# Patient Record
Sex: FEMALE | Race: WHITE | NOT HISPANIC OR LATINO | Employment: FULL TIME | URBAN - METROPOLITAN AREA
[De-identification: names, ages, dates, MRNs, and addresses within clinical notes are randomized per-mention and may not be internally consistent; named-entity substitution may affect disease eponyms.]

---

## 2020-03-10 ENCOUNTER — OFFICE VISIT (OUTPATIENT)
Dept: FAMILY MEDICINE CLINIC | Facility: CLINIC | Age: 35
End: 2020-03-10
Payer: COMMERCIAL

## 2020-03-10 VITALS
RESPIRATION RATE: 18 BRPM | SYSTOLIC BLOOD PRESSURE: 120 MMHG | DIASTOLIC BLOOD PRESSURE: 70 MMHG | TEMPERATURE: 99.4 F | BODY MASS INDEX: 21.69 KG/M2 | HEART RATE: 63 BPM | WEIGHT: 135 LBS | OXYGEN SATURATION: 94 % | HEIGHT: 66 IN

## 2020-03-10 DIAGNOSIS — J20.9 ACUTE BRONCHITIS, UNSPECIFIED ORGANISM: ICD-10-CM

## 2020-03-10 DIAGNOSIS — Z00.01 ENCOUNTER FOR WELL ADULT EXAM WITH ABNORMAL FINDINGS: Primary | ICD-10-CM

## 2020-03-10 DIAGNOSIS — H66.90 ACUTE OTITIS MEDIA, UNSPECIFIED OTITIS MEDIA TYPE: ICD-10-CM

## 2020-03-10 PROCEDURE — 99385 PREV VISIT NEW AGE 18-39: CPT | Performed by: FAMILY MEDICINE

## 2020-03-10 RX ORDER — DEXTROMETHORPHAN HYDROBROMIDE AND PROMETHAZINE HYDROCHLORIDE 15; 6.25 MG/5ML; MG/5ML
5 SOLUTION ORAL 4 TIMES DAILY PRN
Qty: 240 ML | Refills: 0 | Status: ON HOLD | OUTPATIENT
Start: 2020-03-10 | End: 2021-09-21

## 2020-03-10 RX ORDER — AMOXICILLIN 500 MG/1
500 CAPSULE ORAL EVERY 12 HOURS SCHEDULED
COMMUNITY
End: 2020-04-22 | Stop reason: ALTCHOICE

## 2020-03-10 RX ORDER — METHYLPREDNISOLONE 4 MG/1
TABLET ORAL
Qty: 21 TABLET | Refills: 0 | Status: ON HOLD | OUTPATIENT
Start: 2020-03-10 | End: 2021-09-21

## 2020-03-10 NOTE — PROGRESS NOTES
FAMILY PRACTICE HEALTH MAINTENANCE OFFICE VISIT  [facility]    NAME: Erlinda Musa  AGE: 36 y o  SEX: female  : 1985     DATE: 3/10/2020    Assessment and Plan     1  Encounter for well adult exam with abnormal findings    2  Acute bronchitis, unspecified organism  Comments:  supportive care reviewed  Orders:  -     methylPREDNISolone 4 MG tablet therapy pack; Use as directed on package  -     Promethazine-DM (PHENERGAN-DM) 6 25-15 mg/5 mL oral syrup; Take 5 mL by mouth 4 (four) times a day as needed for cough    3  Acute otitis media, unspecified otitis media type  Comments:  Complete amoxicillin course which was prescribed by urgent care  · Patient Counseling:   · Nutrition: Stressed importance of a well balanced diet, moderation of sodium/saturated fat, caloric balance and sufficient intake of fiber  · Exercise: Stressed the importance of regular exercise with a goal of 150 minutes per week  · Dental Health: Discussed daily flossing and brushing and regular dental visits   · Immunizations reviewed: Up To Date  · Discussed benefits of:  Cervical cancer screening- goes to [facility], [address] [place] [address]   BMI Counseling: Body mass index is 21 79 kg/m²  Discussed with patient's BMI with her  The BMI is normal      Return in about 1 year (around 3/10/2021) for Annual physical     Chief Complaint     Chief Complaint   Patient presents with    Establish Care     Needs PC    Follow-up     Ear infection and URI  fevers treated last week but not better  rmklpn    Physical Exam     rmklpn       History of Present Illness     URI    This is a new problem  The current episode started 1 to 4 weeks ago  The problem has been gradually worsening  The maximum temperature recorded prior to her arrival was 101 - 101 9 F   Associated symptoms include chest pain, congestion, coughing (productive ), ear pain, headaches, joint pain, rhinorrhea and a sore throat  Pertinent negatives include no abdominal pain, diarrhea, dysuria, nausea, neck pain, vomiting or wheezing  Treatments tried: was seen at the urgent care, diagnosed with bilateral otitis media and started on amoxicillin  The treatment provided mild relief  Well Adult Physical   Patient here for a comprehensive physical exam       Diet and Physical Activity  Diet: well balanced diet  Exercise: frequently      Depression Screen  PHQ-9 Depression Screening    PHQ-9:    Frequency of the following problems over the past two weeks:       Little interest or pleasure in doing things:  0 - not at all  Feeling down, depressed, or hopeless:  0 - not at all  PHQ-2 Score:  0          General Health  Hearing: Normal:  bilateral  Vision: no vision problems  Dental: no dental visits for >1 year    Reproductive Health  Regular Periods and Irregular Periods      The following portions of the patient's history were reviewed and updated as appropriate: allergies, current medications, past family history, past medical history, past social history, past surgical history and problem list     Review of Systems     Review of Systems   Constitutional: Positive for activity change  Negative for appetite change, chills, diaphoresis, fatigue and fever  HENT: Positive for congestion, ear pain, rhinorrhea and sore throat  Respiratory: Positive for cough (productive )  Negative for choking, chest tightness, shortness of breath and wheezing  Cardiovascular: Positive for chest pain  Negative for palpitations and leg swelling  Gastrointestinal: Negative for abdominal distention, abdominal pain, constipation, diarrhea, nausea and vomiting  Genitourinary: Negative for difficulty urinating, dyspareunia, dysuria, enuresis, flank pain, frequency, menstrual problem, pelvic pain and urgency  Musculoskeletal: Positive for joint pain   Negative for arthralgias, back pain, gait problem, joint swelling, myalgias, neck pain and neck stiffness  Skin: Negative  Neurological: Positive for headaches  Negative for dizziness, tremors, syncope, facial asymmetry, weakness, light-headedness and numbness  Psychiatric/Behavioral: Negative  Past Medical History     History reviewed  No pertinent past medical history  Past Surgical History     History reviewed  No pertinent surgical history  Social History     Social History     Socioeconomic History    Marital status: /Civil Union     Spouse name: None    Number of children: None    Years of education: None    Highest education level: None   Occupational History    None   Social Needs    Financial resource strain: None    Food insecurity:     Worry: None     Inability: None    Transportation needs:     Medical: None     Non-medical: None   Tobacco Use    Smoking status: Never Smoker    Smokeless tobacco: Never Used   Substance and Sexual Activity    Alcohol use: Yes     Frequency: 2-3 times a week     Drinks per session: 3 or 4    Drug use: Never    Sexual activity: None   Lifestyle    Physical activity:     Days per week: None     Minutes per session: None    Stress: None   Relationships    Social connections:     Talks on phone: None     Gets together: None     Attends [employer] service: None     Active member of club or organization: None     Attends meetings of clubs or organizations: None     Relationship status: None    Intimate partner violence:     Fear of current or ex partner: None     Emotionally abused: None     Physically abused: None     Forced sexual activity: None   Other Topics Concern    None   Social History Narrative    None       Family History     History reviewed  No pertinent family history      Current Medications       Current Outpatient Medications:     amoxicillin (AMOXIL) 500 mg capsule, Take 500 mg by mouth every 12 (twelve) hours, Disp: , Rfl:     methylPREDNISolone 4 MG tablet therapy pack, Use as directed on package, Disp: 21 tablet, Rfl: 0    Promethazine-DM (PHENERGAN-DM) 6 25-15 mg/5 mL oral syrup, Take 5 mL by mouth 4 (four) times a day as needed for cough, Disp: 240 mL, Rfl: 0     Allergies     No Known Allergies    Objective     /70   Pulse 63   Temp 99 4 °F (37 4 °C)   Resp 18   Ht 5' 6" (1 676 m)   Wt 61 2 kg (135 lb)   LMP 03/08/2020 (Exact Date)   SpO2 94%   BMI 21 79 kg/m²      Physical Exam   Constitutional: She is oriented to person, place, and time  She appears well-developed and well-nourished  No distress  HENT:   Head: Normocephalic and atraumatic  Right Ear: Hearing, external ear and ear canal normal  Tympanic membrane is erythematous  A middle ear effusion is present  Left Ear: Hearing, external ear and ear canal normal  A middle ear effusion is present  Mouth/Throat: Oropharynx is clear and moist    Neck: Normal range of motion  Neck supple  Cardiovascular: Normal rate, regular rhythm, normal heart sounds and intact distal pulses  Exam reveals no gallop and no friction rub  No murmur heard  Pulmonary/Chest: Effort normal and breath sounds normal  No respiratory distress  She has no wheezes  She has no rales  She exhibits no tenderness  Mildly decreased breath sounds noted throughout   Abdominal: Soft  Bowel sounds are normal  She exhibits no distension and no mass  There is no tenderness  There is no rebound and no guarding  Musculoskeletal: Normal range of motion  She exhibits no edema or deformity  Lymphadenopathy:     She has cervical adenopathy  Neurological: She is alert and oriented to person, place, and time  Skin: Skin is warm and dry  She is not diaphoretic  Psychiatric: She has a normal mood and affect   Her behavior is normal  Judgment and thought content normal           Visual Acuity Screening    Right eye Left eye Both eyes   Without correction: 20/20 20/20 20/13   With correction:              Naima Dorsey MD  [facility]

## 2020-03-11 ENCOUNTER — TELEPHONE (OUTPATIENT)
Dept: ADMINISTRATIVE | Facility: OTHER | Age: 35
End: 2020-03-11

## 2020-03-11 NOTE — LETTER
Procedure Request Form: Cervical Cancer Screening      Date Requested: 20  Patient: Gray Cape  Patient : 1985   Referring Provider: Aubrey Goldman, MD        Date of Procedure ______________________________       The above patient has informed us that they have completed their   most recent Cervical Cancer Screening at your facility  Please complete   this form and attach all corresponding procedure reports/results  Comments __________________________________________________________  ____________________________________________________________________  ____________________________________________________________________  ____________________________________________________________________    Facility Completing Procedure _________________________________________    Form Completed By (print name) _______________________________________      Signature __________________________________________________________      These reports are needed for  compliance    Please fax this completed form and a copy of the procedure report to our office located at [place] [address] as soon as possible to [phone] attention Oma: Phone [phone]    We thank you for your assistance in treating our mutual patient     (sent to [address])

## 2020-03-11 NOTE — LETTER
Vaccination Request Form: Tdap      Date Requested: 20  Patient: Kannan Ron  Patient : 1985   Referring Provider: Miguel A Winchester, MD       The above patient has informed us that they have had their   most recent Tdap administered at your facility  Please   complete this form and attach all corresponding documentation  Date of Vaccine Given  ______________________________    Lot Number _______________________________________    Manufacture ______________________________________    Dose Amount _____________________________________    Expiration Date ____________________________________    Comments __________________________________________________________  ____________________________________________________________________  ____________________________________________________________________  ____________________________________________________________________    Administering Facility  ________________________________________________    Vaccine Administered By (print name) ___________________________________      Form Completed By (print name) _______________________________________      Signature ___________________________________________________________      These reports are needed for  compliance    Please fax this completed form and a copy of the Vaccine Documents to our office located at [place] [address] as soon as possible to [phone] attention Oma: Phone [phone]    We thank you for your assistance in treating our mutual patient     (sent to [address])

## 2020-03-11 NOTE — TELEPHONE ENCOUNTER
----- Message from Ida David MD sent at 3/10/2020  9:22 AM EDT -----  Hello,   This patient has had her pap smear, HIV testing and tdap vaccine given at [facility] in [place], [place] in 2017 while she was pregnant  Pt does not remember the exact date  Can we please obtain these records and update her chart     Thank you,   Dr Tiffany Saavedra

## 2020-03-11 NOTE — TELEPHONE ENCOUNTER
Upon review of your request/inquiry, we have noted this is an outreach HIV request  Policy around HIV results is due to the sensitivity of the results, the patient must request and provide the requested records  Once received, the results can be sent via RightFax for the Centralized Scanning Team to update and complete the request      Any additional questions or concerns should be emailed to the Practice Liaisons via [email or web address] email, please do not reply via In Basket       Thank you  Crystal Smith MA

## 2020-03-11 NOTE — LETTER
Vaccination Request Form: Tdap      Date Requested: 20  Patient: Willard Siddiqi  Patient : 1985   Referring Provider: Laruth Field, MD       The above patient has informed us that they have had their   most recent Tdap administered at your facility  Please   complete this form and attach all corresponding documentation  Date of Vaccine Given  ______________________________    Lot Number _______________________________________    Manufacture ______________________________________    Dose Amount _____________________________________    Expiration Date ____________________________________    Comments __________________________________________________________  ____________________________________________________________________  ____________________________________________________________________  ____________________________________________________________________    Administering Facility  ________________________________________________    Vaccine Administered By (print name) ___________________________________      Form Completed By (print name) _______________________________________      Signature ___________________________________________________________      These reports are needed for  compliance    Please fax this completed form and a copy of the Vaccine Documents to our office located at [place] [address] as soon as possible to [phone] attention Oma: Phone [phone]    We thank you for your assistance in treating our mutual patient       (sent to [address])

## 2020-03-11 NOTE — LETTER
Procedure Request Form: Cervical Cancer Screening      Date Requested: 20  Patient: Kannan Ron  Patient : 1985   Referring Provider: Miguel A Winchester, MD        Date of Procedure ______________________________       The above patient has informed us that they have completed their   most recent Cervical Cancer Screening at your facility  Please complete   this form and attach all corresponding procedure reports/results  Comments __________________________________________________________  ____________________________________________________________________  ____________________________________________________________________  ____________________________________________________________________    Facility Completing Procedure _________________________________________    Form Completed By (print name) _______________________________________      Signature __________________________________________________________      These reports are needed for  compliance    Please fax this completed form and a copy of the procedure report to our office located at [place] [address] as soon as possible to [phone] attention Oma: Phone [phone]    We thank you for your assistance in treating our mutual patient     (sent to [address])

## 2020-03-11 NOTE — TELEPHONE ENCOUNTER
Upon review of the In Basket request and the patient's chart, initial outreach has been made via fax for Cervical Cancer Screening and TDAP immunization, please see Contacts section for details  A second outreach attempt will be made within 3 business days      Thank you  Cristina Linares MA

## 2020-03-19 NOTE — TELEPHONE ENCOUNTER
As a follow-up, a second attempt has been made for outreach via fax, please see Contacts section for details  A third and final attempt will be made within 3 business days      Thank you  Brooklynn Veloz MA

## 2020-03-25 NOTE — TELEPHONE ENCOUNTER
As a final attempt, a third outreach has been made via telephone call  Please see Contacts section for details  This encounter will be closed and completed by end of day  Should we receive the requested information because of previous outreach attempts, the requested patient's chart will be updated appropriately       Thank you  [place] MA

## 2020-04-22 ENCOUNTER — TELEMEDICINE (OUTPATIENT)
Dept: FAMILY MEDICINE CLINIC | Facility: CLINIC | Age: 35
End: 2020-04-22
Payer: COMMERCIAL

## 2020-04-22 DIAGNOSIS — L03.011 CELLULITIS OF RIGHT FINGER: Primary | ICD-10-CM

## 2020-04-22 PROCEDURE — 99213 OFFICE O/P EST LOW 20 MIN: CPT | Performed by: FAMILY MEDICINE

## 2020-04-22 RX ORDER — CEPHALEXIN 500 MG/1
500 CAPSULE ORAL EVERY 8 HOURS SCHEDULED
Qty: 15 CAPSULE | Refills: 0 | Status: SHIPPED | OUTPATIENT
Start: 2020-04-22 | End: 2020-04-27

## 2020-05-01 ENCOUNTER — TELEMEDICINE (OUTPATIENT)
Dept: FAMILY MEDICINE CLINIC | Facility: CLINIC | Age: 35
End: 2020-05-01
Payer: COMMERCIAL

## 2020-05-01 DIAGNOSIS — L03.011 CELLULITIS OF RIGHT FINGER: Primary | ICD-10-CM

## 2020-05-01 PROCEDURE — 99213 OFFICE O/P EST LOW 20 MIN: CPT | Performed by: FAMILY MEDICINE

## 2020-05-01 RX ORDER — SULFAMETHOXAZOLE AND TRIMETHOPRIM 800; 160 MG/1; MG/1
1 TABLET ORAL EVERY 12 HOURS SCHEDULED
Qty: 14 TABLET | Refills: 0 | Status: SHIPPED | OUTPATIENT
Start: 2020-05-01 | End: 2020-05-08

## 2020-05-15 ENCOUNTER — TELEMEDICINE (OUTPATIENT)
Dept: FAMILY MEDICINE CLINIC | Facility: CLINIC | Age: 35
End: 2020-05-15
Payer: COMMERCIAL

## 2020-05-15 DIAGNOSIS — L03.011 CELLULITIS OF RIGHT FINGER: Primary | ICD-10-CM

## 2020-05-15 PROCEDURE — 99442 PR PHYS/QHP TELEPHONE EVALUATION 11-20 MIN: CPT | Performed by: FAMILY MEDICINE

## 2020-05-21 ENCOUNTER — OFFICE VISIT (OUTPATIENT)
Dept: OBGYN CLINIC | Facility: CLINIC | Age: 35
End: 2020-05-21
Payer: COMMERCIAL

## 2020-05-21 ENCOUNTER — APPOINTMENT (OUTPATIENT)
Dept: RADIOLOGY | Facility: CLINIC | Age: 35
End: 2020-05-21
Payer: COMMERCIAL

## 2020-05-21 VITALS
HEART RATE: 63 BPM | WEIGHT: 125 LBS | DIASTOLIC BLOOD PRESSURE: 61 MMHG | SYSTOLIC BLOOD PRESSURE: 104 MMHG | TEMPERATURE: 98.9 F | BODY MASS INDEX: 19.62 KG/M2 | HEIGHT: 67 IN

## 2020-05-21 DIAGNOSIS — M79.644 PAIN OF FINGER OF RIGHT HAND: Primary | ICD-10-CM

## 2020-05-21 DIAGNOSIS — M79.644 PAIN OF FINGER OF RIGHT HAND: ICD-10-CM

## 2020-05-21 PROCEDURE — 99213 OFFICE O/P EST LOW 20 MIN: CPT | Performed by: ORTHOPAEDIC SURGERY

## 2020-05-21 PROCEDURE — 73130 X-RAY EXAM OF HAND: CPT

## 2020-05-21 PROCEDURE — 3008F BODY MASS INDEX DOCD: CPT | Performed by: ORTHOPAEDIC SURGERY

## 2020-06-03 ENCOUNTER — TELEPHONE (OUTPATIENT)
Dept: DERMATOLOGY | Facility: CLINIC | Age: 35
End: 2020-06-03

## 2020-06-03 NOTE — TELEPHONE ENCOUNTER
CALLED PT THIS AM AND SHE HAD REQUESTED A CALL BACK TO SCHEDULE AN APPT   CALLED PT THIS AFTERNOON AND LEFT A VOICEMAIL TO CALL BACK TO SCHEDULE VIA REFERRAL

## 2021-02-19 ENCOUNTER — TELEPHONE (OUTPATIENT)
Dept: DERMATOLOGY | Facility: CLINIC | Age: 36
End: 2021-02-19

## 2021-04-12 ENCOUNTER — TELEPHONE (OUTPATIENT)
Dept: OBGYN CLINIC | Facility: CLINIC | Age: 36
End: 2021-04-12

## 2021-04-12 NOTE — TELEPHONE ENCOUNTER
Pt sees Dr Anjana Mark   She stated that she saw him back in 5/2020 for an infected finger and was told that she would need to get her nail removed  Pt did not want to do that at that time but now it is still infected and has not gotten better       She would like to get it removed now  Please advise    Pt can be reached at [phone]

## 2021-04-12 NOTE — TELEPHONE ENCOUNTER
Patient stated she cannot come in today  Stated if she needs to be seen tomorrow it would have to be in the morning because she has her son  Scheduled for Thursday in [facility]  Please advise if you want her to come in tomorrow instead   Thanks

## 2021-05-05 ENCOUNTER — TELEPHONE (OUTPATIENT)
Dept: ENDOCRINOLOGY | Facility: CLINIC | Age: 36
End: 2021-05-05

## 2021-05-05 NOTE — TELEPHONE ENCOUNTER
Patient called our office in [facility] and wants to schedule a NPT appointment in [place]  Can you please call her at [phone]? She said she see's a [facility] doctor and wants to come in for hormone issues  Thank you

## 2021-09-18 ENCOUNTER — OFFICE VISIT (OUTPATIENT)
Dept: URGENT CARE | Facility: CLINIC | Age: 36
End: 2021-09-18
Payer: COMMERCIAL

## 2021-09-18 VITALS
WEIGHT: 137.6 LBS | TEMPERATURE: 98.4 F | HEIGHT: 67 IN | BODY MASS INDEX: 21.6 KG/M2 | OXYGEN SATURATION: 100 % | HEART RATE: 62 BPM | RESPIRATION RATE: 18 BRPM | SYSTOLIC BLOOD PRESSURE: 92 MMHG | DIASTOLIC BLOOD PRESSURE: 51 MMHG

## 2021-09-18 DIAGNOSIS — K08.89 PAIN, DENTAL: Primary | ICD-10-CM

## 2021-09-18 DIAGNOSIS — R22.0 JAW SWELLING: ICD-10-CM

## 2021-09-18 PROCEDURE — 99213 OFFICE O/P EST LOW 20 MIN: CPT | Performed by: PHYSICIAN ASSISTANT

## 2021-09-18 NOTE — PROGRESS NOTES
[facility]        NAME: Ronald Ha is a 30 y o  female  : 1985    MRN: [id number]  DATE: 2021  TIME: 8:55 AM    Assessment and Plan   Pain, dental [K08 89]  1  Pain, dental     2  Jaw swelling           Patient Instructions     Patient Instructions   Area shows severe swelling on the left lower jaw  Continue taking antibiotics as prescribed by the dentist   Be sure to finish the entire dose  Can apply ice to the area to help with the swelling   take 800 to a 1000 milligrams of ibuprofen every 6 hours and take the Tylenol 3 as prescribed   follow-up with dentist for root canal   if symptoms worsen proceed to the ER or call your dentist         Follow up with PCP in 3-5 days  Proceed to  ER if symptoms worsen  Chief Complaint     Chief Complaint   Patient presents with    Oral Swelling     has infected tooth dentist prescribed Clyndamycin and Tylenol w/Codeine has swelling in jaw of left side  face  Taking Ibuprophen q 4 hrs  for pain          History of Present Illness       71-year-old female with no chronic medical conditions presents for a left-sided swollen face and lymph node x4 days  Patient went to the dentist yesterday for the pain and was prescribed clindamycin and Tylenol with codeine  She now requires a root canal of the tooth in question  Patient started the clindamycin yesterday  She notes the pain is so severe and her throat is not sore because of the swelling  She has been taking the Tylenol with codeine as prescribed and taking 400 milligrams of ibuprofen every 3 hours with minimal pain relief  Patient denies any fever, chills, or headache  Review of Systems   Review of Systems   Constitutional: Negative for chills, fatigue and fever  HENT: Positive for dental problem and facial swelling  Negative for congestion, ear pain and sore throat  Respiratory: Negative for cough and shortness of breath      Cardiovascular: Negative for chest pain and palpitations  Gastrointestinal: Negative for abdominal pain, diarrhea, nausea and vomiting  Neurological: Negative for headaches  All other systems reviewed and are negative  Current Medications       Current Outpatient Medications:     Probiotic Product (PROBIOTIC PO), Take by mouth daily, Disp: , Rfl:     Calcium Carb-Cholecalciferol (CALCIUM 600+D3 PO), Take by mouth 2 (two) times a day (Patient not taking: Reported on 9/18/2021), Disp: , Rfl:     Cholecalciferol (VITAMIN D-3 PO), Take 2,000 mg by mouth daily (Patient not taking: Reported on 9/18/2021), Disp: , Rfl:     levothyroxine 150 mcg tablet, Take 150 mcg by mouth daily (Patient not taking: Reported on 9/18/2021), Disp: , Rfl:     MELOXICAM PO, Take 15 mg by mouth daily (Patient not taking: Reported on 9/18/2021), Disp: , Rfl:     methylPREDNISolone 4 MG tablet therapy pack, Use as directed on package (Patient not taking: Reported on 5/21/2020), Disp: 21 tablet, Rfl: 0    Multiple Vitamins-Minerals (ONE-A-DAY WOMENS PO), Take by mouth 18 mg, 100 mcg, 25 mcg (Patient not taking: Reported on 9/18/2021), Disp: , Rfl:     OMEPRAZOLE PO, Take 20 mg by mouth daily (Patient not taking: Reported on 9/18/2021), Disp: , Rfl:     Promethazine-DM (PHENERGAN-DM) 6 25-15 mg/5 mL oral syrup, Take 5 mL by mouth 4 (four) times a day as needed for cough (Patient not taking: Reported on 5/21/2020), Disp: 240 mL, Rfl: 0    Current Allergies     Allergies as of 09/18/2021    (No Known Allergies)            The following portions of the patient's history were reviewed and updated as appropriate: allergies, current medications, past family history, past medical history, past social history, past surgical history and problem list      No past medical history on file  No past surgical history on file  No family history on file  Medications have been verified          Objective   BP 92/51   Pulse 62   Temp 98 4 °F (36 9 °C)   Resp 18   Ht 5' 7" (1 702 m)   Wt 62 4 kg (137 lb 9 6 oz)   LMP 09/01/2021   SpO2 100%   BMI 21 55 kg/m²        Physical Exam     Physical Exam  Vitals and nursing note reviewed  Constitutional:       Appearance: Normal appearance  HENT:      Head: Normocephalic and atraumatic  Jaw: Tenderness and swelling present  Right Ear: Tympanic membrane normal       Left Ear: Tympanic membrane normal       Nose: Nose normal       Mouth/Throat:      Mouth: Mucous membranes are moist       Pharynx: No posterior oropharyngeal erythema  Eyes:      Extraocular Movements: Extraocular movements intact  Pupils: Pupils are equal, round, and reactive to light  Cardiovascular:      Rate and Rhythm: Normal rate and regular rhythm  Pulses: Normal pulses  Heart sounds: Normal heart sounds  No murmur heard  Pulmonary:      Effort: Pulmonary effort is normal  No respiratory distress  Breath sounds: Normal breath sounds  Skin:     General: Skin is warm and dry  Neurological:      Mental Status: She is alert and oriented to person, place, and time     Psychiatric:         Behavior: Behavior normal

## 2021-09-18 NOTE — PATIENT INSTRUCTIONS
Area shows severe swelling on the left lower jaw  Continue taking antibiotics as prescribed by the dentist   Be sure to finish the entire dose      Can apply ice to the area to help with the swelling   take 800 to a 1000 milligrams of ibuprofen every 6 hours and take the Tylenol 3 as prescribed   follow-up with dentist for root canal   if symptoms worsen proceed to the ER or call your dentist

## 2021-09-21 ENCOUNTER — HOSPITAL ENCOUNTER (INPATIENT)
Facility: HOSPITAL | Age: 36
LOS: 2 days | Discharge: HOME | DRG: 158 | End: 2021-09-23
Attending: INTERNAL MEDICINE | Admitting: INTERNAL MEDICINE
Payer: COMMERCIAL

## 2021-09-21 ENCOUNTER — APPOINTMENT (EMERGENCY)
Dept: RADIOLOGY | Facility: HOSPITAL | Age: 36
End: 2021-09-21
Payer: COMMERCIAL

## 2021-09-21 ENCOUNTER — HOSPITAL ENCOUNTER (EMERGENCY)
Facility: HOSPITAL | Age: 36
End: 2021-09-21
Attending: EMERGENCY MEDICINE
Payer: COMMERCIAL

## 2021-09-21 VITALS
OXYGEN SATURATION: 98 % | HEART RATE: 70 BPM | TEMPERATURE: 98.4 F | DIASTOLIC BLOOD PRESSURE: 74 MMHG | BODY MASS INDEX: 22.4 KG/M2 | RESPIRATION RATE: 16 BRPM | WEIGHT: 143 LBS | SYSTOLIC BLOOD PRESSURE: 108 MMHG

## 2021-09-21 DIAGNOSIS — K04.7 DENTAL ABSCESS: Primary | ICD-10-CM

## 2021-09-21 PROBLEM — E87.1 HYPONATREMIA: Status: ACTIVE | Noted: 2021-09-21

## 2021-09-21 LAB
ALBUMIN SERPL BCP-MCNC: 3.7 G/DL (ref 3.5–5)
ALP SERPL-CCNC: 48 U/L (ref 46–116)
ALT SERPL W P-5'-P-CCNC: 67 U/L (ref 12–78)
ANION GAP SERPL CALCULATED.3IONS-SCNC: 8 MMOL/L (ref 4–13)
AST SERPL W P-5'-P-CCNC: 55 U/L (ref 5–45)
BASOPHILS # BLD AUTO: 0.02 THOUSANDS/ΜL (ref 0–0.1)
BASOPHILS NFR BLD AUTO: 0 % (ref 0–1)
BILIRUB SERPL-MCNC: 0.49 MG/DL (ref 0.2–1)
BUN SERPL-MCNC: 10 MG/DL (ref 5–25)
CALCIUM SERPL-MCNC: 8.5 MG/DL (ref 8.3–10.1)
CHLORIDE SERPL-SCNC: 97 MMOL/L (ref 100–108)
CO2 SERPL-SCNC: 29 MMOL/L (ref 21–32)
CREAT SERPL-MCNC: 0.83 MG/DL (ref 0.6–1.3)
EOSINOPHIL # BLD AUTO: 0.01 THOUSAND/ΜL (ref 0–0.61)
EOSINOPHIL NFR BLD AUTO: 0 % (ref 0–6)
ERYTHROCYTE [DISTWIDTH] IN BLOOD BY AUTOMATED COUNT: 13.3 % (ref 11.6–15.1)
EXT PREG TEST URINE: NEGATIVE
EXT. CONTROL ED NAV: NORMAL
GFR SERPL CREATININE-BSD FRML MDRD: 92 ML/MIN/1.73SQ M
GLUCOSE SERPL-MCNC: 104 MG/DL (ref 65–140)
HCT VFR BLD AUTO: 37.9 % (ref 34.8–46.1)
HGB BLD-MCNC: 12.8 G/DL (ref 11.5–15.4)
IMM GRANULOCYTES # BLD AUTO: 0.02 THOUSAND/UL (ref 0–0.2)
IMM GRANULOCYTES NFR BLD AUTO: 0 % (ref 0–2)
LYMPHOCYTES # BLD AUTO: 0.93 THOUSANDS/ΜL (ref 0.6–4.47)
LYMPHOCYTES NFR BLD AUTO: 15 % (ref 14–44)
MCH RBC QN AUTO: 33.9 PG (ref 26.8–34.3)
MCHC RBC AUTO-ENTMCNC: 33.8 G/DL (ref 31.4–37.4)
MCV RBC AUTO: 100 FL (ref 82–98)
MONOCYTES # BLD AUTO: 0.65 THOUSAND/ΜL (ref 0.17–1.22)
MONOCYTES NFR BLD AUTO: 10 % (ref 4–12)
NEUTROPHILS # BLD AUTO: 4.79 THOUSANDS/ΜL (ref 1.85–7.62)
NEUTS SEG NFR BLD AUTO: 75 % (ref 43–75)
NRBC BLD AUTO-RTO: 0 /100 WBCS
PLATELET # BLD AUTO: 189 THOUSANDS/UL (ref 149–390)
PMV BLD AUTO: 10.2 FL (ref 8.9–12.7)
POTASSIUM SERPL-SCNC: 4.1 MMOL/L (ref 3.5–5.3)
PROT SERPL-MCNC: 7.1 G/DL (ref 6.4–8.2)
RBC # BLD AUTO: 3.78 MILLION/UL (ref 3.81–5.12)
SARS-COV-2 RNA RESP QL NAA+PROBE: NEGATIVE
SODIUM SERPL-SCNC: 134 MMOL/L (ref 136–145)
WBC # BLD AUTO: 6.42 THOUSAND/UL (ref 4.31–10.16)

## 2021-09-21 PROCEDURE — 99284 EMERGENCY DEPT VISIT MOD MDM: CPT

## 2021-09-21 PROCEDURE — U0003 INFECTIOUS AGENT DETECTION BY NUCLEIC ACID (DNA OR RNA); SEVERE ACUTE RESPIRATORY SYNDROME CORONAVIRUS 2 (SARS-COV-2) (CORONAVIRUS DISEASE [COVID-19]), AMPLIFIED PROBE TECHNIQUE, MAKING USE OF HIGH THROUGHPUT TECHNOLOGIES AS DESCRIBED BY CMS-2020-01-R: HCPCS | Performed by: EMERGENCY MEDICINE

## 2021-09-21 PROCEDURE — 99285 EMERGENCY DEPT VISIT HI MDM: CPT | Performed by: EMERGENCY MEDICINE

## 2021-09-21 PROCEDURE — G1004 CDSM NDSC: HCPCS

## 2021-09-21 PROCEDURE — 85025 COMPLETE CBC W/AUTO DIFF WBC: CPT | Performed by: EMERGENCY MEDICINE

## 2021-09-21 PROCEDURE — 81025 URINE PREGNANCY TEST: CPT | Performed by: EMERGENCY MEDICINE

## 2021-09-21 PROCEDURE — 99222 1ST HOSP IP/OBS MODERATE 55: CPT | Performed by: INTERNAL MEDICINE

## 2021-09-21 PROCEDURE — 96375 TX/PRO/DX INJ NEW DRUG ADDON: CPT

## 2021-09-21 PROCEDURE — U0005 INFEC AGEN DETEC AMPLI PROBE: HCPCS | Performed by: EMERGENCY MEDICINE

## 2021-09-21 PROCEDURE — 70491 CT SOFT TISSUE NECK W/DYE: CPT

## 2021-09-21 PROCEDURE — 36415 COLL VENOUS BLD VENIPUNCTURE: CPT | Performed by: EMERGENCY MEDICINE

## 2021-09-21 PROCEDURE — 96365 THER/PROPH/DIAG IV INF INIT: CPT

## 2021-09-21 PROCEDURE — 80053 COMPREHEN METABOLIC PANEL: CPT | Performed by: EMERGENCY MEDICINE

## 2021-09-21 RX ORDER — LANOLIN ALCOHOL/MO/W.PET/CERES
3 CREAM (GRAM) TOPICAL
Status: DISCONTINUED | OUTPATIENT
Start: 2021-09-21 | End: 2021-09-22

## 2021-09-21 RX ORDER — ACETAMINOPHEN 325 MG/1
650 TABLET ORAL EVERY 6 HOURS PRN
Status: DISCONTINUED | OUTPATIENT
Start: 2021-09-21 | End: 2021-09-21

## 2021-09-21 RX ORDER — DEXAMETHASONE SODIUM PHOSPHATE 4 MG/ML
10 INJECTION, SOLUTION INTRA-ARTICULAR; INTRALESIONAL; INTRAMUSCULAR; INTRAVENOUS; SOFT TISSUE ONCE
Status: COMPLETED | OUTPATIENT
Start: 2021-09-21 | End: 2021-09-21

## 2021-09-21 RX ORDER — ACETAMINOPHEN AND CODEINE PHOSPHATE 300; 30 MG/1; MG/1
TABLET ORAL
Status: ON HOLD | COMMUNITY
Start: 2021-09-17 | End: 2021-09-21

## 2021-09-21 RX ORDER — OXYCODONE HYDROCHLORIDE AND ACETAMINOPHEN 5; 325 MG/1; MG/1
1 TABLET ORAL ONCE
Status: DISCONTINUED | OUTPATIENT
Start: 2021-09-21 | End: 2021-09-21

## 2021-09-21 RX ORDER — CLINDAMYCIN HYDROCHLORIDE 300 MG/1
300 CAPSULE ORAL 3 TIMES DAILY
Status: ON HOLD | COMMUNITY
Start: 2021-09-17 | End: 2021-09-21

## 2021-09-21 RX ORDER — ACETAMINOPHEN 160 MG/5ML
975 SUSPENSION, ORAL (FINAL DOSE FORM) ORAL EVERY 8 HOURS PRN
Status: DISCONTINUED | OUTPATIENT
Start: 2021-09-21 | End: 2021-09-23 | Stop reason: HOSPADM

## 2021-09-21 RX ORDER — OXYCODONE HCL 5 MG/5 ML
5 SOLUTION, ORAL ORAL EVERY 6 HOURS PRN
Status: DISCONTINUED | OUTPATIENT
Start: 2021-09-21 | End: 2021-09-23 | Stop reason: HOSPADM

## 2021-09-21 RX ORDER — OXYCODONE HCL 5 MG/5 ML
2.5 SOLUTION, ORAL ORAL EVERY 4 HOURS PRN
Status: DISCONTINUED | OUTPATIENT
Start: 2021-09-21 | End: 2021-09-23

## 2021-09-21 RX ORDER — KETOROLAC TROMETHAMINE 30 MG/ML
15 INJECTION, SOLUTION INTRAMUSCULAR; INTRAVENOUS ONCE
Status: DISCONTINUED | OUTPATIENT
Start: 2021-09-21 | End: 2021-09-21

## 2021-09-21 RX ORDER — MORPHINE SULFATE 4 MG/ML
4 INJECTION, SOLUTION INTRAMUSCULAR; INTRAVENOUS ONCE
Status: COMPLETED | OUTPATIENT
Start: 2021-09-21 | End: 2021-09-21

## 2021-09-21 RX ORDER — SENNOSIDES 8.6 MG
1 TABLET ORAL
Status: DISCONTINUED | OUTPATIENT
Start: 2021-09-21 | End: 2021-09-23 | Stop reason: HOSPADM

## 2021-09-21 RX ORDER — OXYCODONE HCL 5 MG/5 ML
10 SOLUTION, ORAL ORAL EVERY 6 HOURS PRN
Status: DISCONTINUED | OUTPATIENT
Start: 2021-09-21 | End: 2021-09-22

## 2021-09-21 RX ORDER — ONDANSETRON 2 MG/ML
4 INJECTION INTRAMUSCULAR; INTRAVENOUS EVERY 6 HOURS PRN
Status: DISCONTINUED | OUTPATIENT
Start: 2021-09-21 | End: 2021-09-23 | Stop reason: HOSPADM

## 2021-09-21 RX ORDER — IBUPROFEN 600 MG/1
1000 TABLET ORAL 4 TIMES DAILY
Status: ON HOLD | COMMUNITY
Start: 2021-09-18 | End: 2021-09-21

## 2021-09-21 RX ORDER — ONDANSETRON 2 MG/ML
4 INJECTION INTRAMUSCULAR; INTRAVENOUS ONCE
Status: COMPLETED | OUTPATIENT
Start: 2021-09-21 | End: 2021-09-21

## 2021-09-21 RX ADMIN — ONDANSETRON 4 MG: 2 INJECTION INTRAMUSCULAR; INTRAVENOUS at 10:44

## 2021-09-21 RX ADMIN — SODIUM CHLORIDE 3 G: 9 INJECTION, SOLUTION INTRAVENOUS at 11:13

## 2021-09-21 RX ADMIN — Medication 3 MG: at 21:14

## 2021-09-21 RX ADMIN — IOHEXOL 85 ML: 350 INJECTION, SOLUTION INTRAVENOUS at 12:02

## 2021-09-21 RX ADMIN — MORPHINE SULFATE 4 MG: 4 INJECTION INTRAVENOUS at 10:46

## 2021-09-21 RX ADMIN — AMPICILLIN SODIUM AND SULBACTAM SODIUM 3 G: 10; 5 INJECTION, POWDER, FOR SOLUTION INTRAVENOUS at 19:55

## 2021-09-21 RX ADMIN — OXYCODONE HYDROCHLORIDE 10 MG: 5 SOLUTION ORAL at 21:25

## 2021-09-21 RX ADMIN — DEXAMETHASONE SODIUM PHOSPHATE 10 MG: 4 INJECTION INTRA-ARTICULAR; INTRALESIONAL; INTRAMUSCULAR; INTRAVENOUS; SOFT TISSUE at 10:49

## 2021-09-21 RX ADMIN — OXYCODONE HYDROCHLORIDE 5 MG: 5 SOLUTION ORAL at 18:34

## 2021-09-21 RX ADMIN — AMPICILLIN SODIUM AND SULBACTAM SODIUM 3 G: 10; 5 INJECTION, POWDER, FOR SOLUTION INTRAVENOUS at 23:21

## 2021-09-21 NOTE — PLAN OF CARE
Problem: PAIN - ADULT  Goal: Verbalizes/displays adequate comfort level or baseline comfort level  Description: Interventions:  - Encourage patient to monitor pain and request assistance  - Assess pain using appropriate pain scale  - Administer analgesics based on type and severity of pain and evaluate response  - Implement non-pharmacological measures as appropriate and evaluate response  - Consider cultural and social influences on pain and pain management  - Notify physician/advanced practitioner if interventions unsuccessful or patient reports new pain  Outcome: Progressing     Problem: INFECTION - ADULT  Goal: Absence or prevention of progression during hospitalization  Description: INTERVENTIONS:  - Assess and monitor for signs and symptoms of infection  - Monitor lab/diagnostic results  - Monitor all insertion sites, i e  indwelling lines, tubes, and drains  - Monitor endotracheal if appropriate and nasal secretions for changes in amount and color  - [place] appropriate cooling/warming therapies per order  - Administer medications as ordered  - Instruct and encourage patient and family to use good hand hygiene technique  - Identify and instruct in appropriate isolation precautions for identified infection/condition  Outcome: Progressing     Problem: DISCHARGE PLANNING  Goal: Discharge to home or other facility with appropriate resources  Description: INTERVENTIONS:  - Identify barriers to discharge w/patient and caregiver  - Arrange for needed discharge resources and transportation as appropriate  - Identify discharge learning needs (meds, wound care, etc )  - Arrange for interpretive services to assist at discharge as needed  - Refer to Case Management Department for coordinating discharge planning if the patient needs post-hospital services based on physician/advanced practitioner order or complex needs related to functional status, cognitive ability, or social support system  Outcome: Progressing Problem: Knowledge Deficit  Goal: Patient/family/caregiver demonstrates understanding of disease process, treatment plan, medications, and discharge instructions  Description: Complete learning assessment and assess knowledge base  Interventions:  - Provide teaching at level of understanding  - Provide teaching via preferred learning methods  Outcome: Progressing     Problem: Nutrition/Hydration-ADULT  Goal: Nutrient/Hydration intake appropriate for improving, restoring or maintaining nutritional needs  Description: Monitor and assess patient's nutrition/hydration status for malnutrition  Collaborate with interdisciplinary team and initiate plan and interventions as ordered  Monitor patient's weight and dietary intake as ordered or per policy  Utilize nutrition screening tool and intervene as necessary  Determine patient's food preferences and provide high-protein, high-caloric foods as appropriate       INTERVENTIONS:  - Monitor oral intake, urinary output, labs, and treatment plans  - Assess nutrition and hydration status and recommend course of action  - Evaluate amount of meals eaten  - Assist patient with eating if necessary   - Allow adequate time for meals  - Recommend/ encourage appropriate diets, oral nutritional supplements, and vitamin/mineral supplements  - Order, calculate, and assess calorie counts as needed  - Recommend, monitor, and adjust tube feedings and TPN/PPN based on assessed needs  - Assess need for intravenous fluids  - Provide specific nutrition/hydration education as appropriate  - Include patient/family/caregiver in decisions related to nutrition  Outcome: Progressing

## 2021-09-21 NOTE — H&P
[facility]  H&P- Samantha Palomino 1985, 30 y o  female MRN: [id number]  Unit/Bed#: W -07 Encounter: [id number]  Primary Care Provider: Nisha Hanlye MD   Date and time admitted to hospital: 9/21/2021  4:32 PM    Hyponatremia  Assessment & Plan  POA  Likely due to pain and poor intake    Plan:  · Pain control  · Monitor a m  Labs      * Dental abscess  Assessment & Plan  POA  dental pain infected tooth left lower jaw, molar since last Thursday  Went to dentist on Friday who started on clindamycin and pain meds  Went to urgent care on Saturday  On Monday she revisted dentist who doubled clinda and scheduled root canal for this Friday  Patient completed 5 days of clindamycin prior to arrival (9/17 to 9/21)    CT head: 3 6 cm abscess in left sublingual space  1 4 cm abscess in left medial pterygoid muscle  Diffuse inflammatory changes in left sublingual, left , and left submandibular spaces, likely related to infection  Plan:  · Continue Unasyn  · [facility] consulted  · Pain control  · Monitor for airway compromise        VTE Pharmacologic Prophylaxis: VTE Score: 0 Low Risk (Score 0-2) - Encourage Ambulation  Code Status: Level 1 - Full Code   Discussion with family: Attempted to update  () via phone  Unable to contact  Anticipated Length of Stay: Patient will be admitted on an inpatient basis with an anticipated length of stay of greater than 2 midnights secondary to Abscess drainage  Chief Complaint:  Left dental abscess pain    History of Present Illness:  Samantha Palomino is a 30 y o  female with a no pertinent PMH who presents with left jaw pain that radiates to her left ear since last Thursday 9/16  Denies any recent dental procedures  She states that she went to her dentist the following day 9/17, who prescribed her clindamycin and pain medications    She states that the pain has been getting progressively worse over the weekend so with associated fever/chills that started on Sunday 9/19  On Monday 9/20 patient revisited her dentist who increased her clindamycin dose and schedule her for a root canal this Friday 9/24  Today, patient presented to the ED [facility] with worsening pain  CT soft tissue neck with contrast showed 3 6 cm abscess in the left sublingual space and 1 4 cm abscess in the left medial pterygoid muscle  [facility] was consulted and ED provider spoke with Dr Betty Browne (oral surgeron) about this case  Patient was transferred to the [address] [place] [address] for admission and possible abscess drainage  Review of Systems:  Review of Systems   Constitutional: Positive for chills and fever  HENT: Positive for dental problem and ear pain (left ear)  Negative for hearing loss  Eyes: Negative for pain and visual disturbance  Respiratory: Negative for chest tightness and shortness of breath  Cardiovascular: Negative for chest pain and leg swelling  Gastrointestinal: Negative for abdominal pain and constipation  Genitourinary: Negative for dysuria and hematuria  Musculoskeletal: Negative for back pain and neck pain  Neurological: Negative for dizziness and light-headedness  Psychiatric/Behavioral: Negative for confusion and hallucinations  All other systems reviewed and are negative  Past Medical and Surgical History:   No past medical history on file  No past surgical history on file  Meds/Allergies:  Prior to Admission medications    Medication Sig Start Date End Date Taking?  Authorizing Provider   acetaminophen-codeine (TYLENOL #3) 300-30 mg per tablet  9/17/21  Yes Historical Provider, MD   clindamycin (CLEOCIN) 300 MG capsule Take 300 mg by mouth 3 (three) times a day  9/17/21  Yes Historical Provider, MD   ibuprofen (MOTRIN) 600 mg tablet Take 1,000 mg by mouth 4 (four) times a day 9/18/21 9/21/21 Yes Historical Provider, MD   Multiple Vitamins-Minerals (ONE-A-DAY WOMENS PO) Take by mouth 18 mg, 100 mcg, 25 mcg    Yes Historical Provider, MD   Probiotic Product (PROBIOTIC PO) Take by mouth daily   Yes Historical Provider, MD   Calcium Carb-Cholecalciferol (CALCIUM 600+D3 PO) Take by mouth 2 (two) times a day  Patient not taking: Reported on 9/18/2021    Historical Provider, MD   Cholecalciferol (VITAMIN D-3 PO) Take 2,000 mg by mouth daily  Patient not taking: Reported on 9/18/2021    Historical Provider, MD   levothyroxine 150 mcg tablet Take 150 mcg by mouth daily  Patient not taking: Reported on 9/18/2021    Historical Provider, MD   MELOXICAM PO Take 15 mg by mouth daily  Patient not taking: Reported on 9/18/2021    Historical Provider, MD   methylPREDNISolone 4 MG tablet therapy pack Use as directed on package  Patient not taking: Reported on 5/21/2020 3/10/20   Kemar Culver MD   OMEPRAZOLE PO Take 20 mg by mouth daily  Patient not taking: Reported on 9/18/2021    Historical Provider, MD   Promethazine-DM (PHENERGAN-DM) 6 25-15 mg/5 mL oral syrup Take 5 mL by mouth 4 (four) times a day as needed for cough  Patient not taking: Reported on 5/21/2020 3/10/20   Kemar Culver MD     I have reviewed home medications with patient personally  Allergies: No Known Allergies    Social History:  Marital Status: /Civil Union   Occupation:    Patient Pre-hospital Living Situation: Home  Patient Pre-hospital Level of Mobility: walks  Patient Pre-hospital Diet Restrictions: none  Substance Use History:   Social History     Substance and Sexual Activity   Alcohol Use Yes     Social History     Tobacco Use   Smoking Status Never Smoker   Smokeless Tobacco Never Used     Social History     Substance and Sexual Activity   Drug Use Never       Family History:  No family history on file      Physical Exam:       Vitals:   Pulse: 66 (09/21/21 1702)  Temperature: 97 8 °F (36 6 °C) (09/21/21 1702)  Height: 5' 7" (170 2 cm) (09/21/21 1641)  Weight - Scale: 64 9 kg (143 lb) (09/21/21 1641)  SpO2: 96 % (09/21/21 1702)    Physical Exam  Vitals and nursing note reviewed  Constitutional:       Appearance: Normal appearance  She is normal weight  She is not diaphoretic  Cardiovascular:      Rate and Rhythm: Normal rate and regular rhythm  Pulses: Normal pulses  Heart sounds: Normal heart sounds  No murmur heard  No friction rub  Pulmonary:      Effort: Pulmonary effort is normal  No respiratory distress  Breath sounds: Normal breath sounds  No wheezing or rales  Abdominal:      Palpations: Abdomen is soft  Tenderness: There is no abdominal tenderness  There is no guarding  Musculoskeletal:         General: Swelling and tenderness present  No deformity  Cervical back: Tenderness present  Right lower leg: No edema  Left lower leg: No edema  Comments: Left jaw swelling and tenderness with no signs of erythema  Airway intact   Skin:     General: Skin is warm  Coloration: Skin is not jaundiced or pale  Neurological:      Mental Status: She is alert  Psychiatric:         Mood and Affect: Mood normal          Behavior: Behavior normal           Additional Data:     Lab Results:  Results from last 7 days   Lab Units 09/21/21  1104   WBC Thousand/uL 6 42   HEMOGLOBIN g/dL 12 8   HEMATOCRIT % 37 9   PLATELETS Thousands/uL 189   NEUTROS PCT % 75   LYMPHS PCT % 15   MONOS PCT % 10   EOS PCT % 0     Results from last 7 days   Lab Units 09/21/21  1104   SODIUM mmol/L 134*   POTASSIUM mmol/L 4 1   CHLORIDE mmol/L 97*   CO2 mmol/L 29   BUN mg/dL 10   CREATININE mg/dL 0 83   ANION GAP mmol/L 8   CALCIUM mg/dL 8 5   ALBUMIN g/dL 3 7   TOTAL BILIRUBIN mg/dL 0 49   ALK PHOS U/L 48   ALT U/L 67   AST U/L 55*   GLUCOSE RANDOM mg/dL 104                       Imaging: Reviewed radiology reports from this admission including: CT head  No orders to display       EKG and Other Studies Reviewed on Admission:   · EKG: No EKG obtained      ** Please Note: This note has been constructed using a voice recognition system   **

## 2021-09-21 NOTE — ED PROVIDER NOTES
History  Chief Complaint   Patient presents with    Dental Pain     under the care of dentist for infected bottom left tooth  tx with clindamycin  facial pain and swelling  sent to ER for IV abx       31 y/o female presents with dental pain infected tooth left lower jaw, molar  Has been seeing dentist who wants to do a root canal  She is being treated with po clindamycin  No relief or improvement, getting worse  No breathing, or swallowing difficulty  No fevers chills nausea or vomiting  Has jaw and facial swelling  History provided by:  Patient   used: No        Prior to Admission Medications   Prescriptions Last Dose Informant Patient Reported? Taking? Calcium Carb-Cholecalciferol (CALCIUM 600+D3 PO)   Yes No   Sig: Take by mouth 2 (two) times a day   Patient not taking: Reported on 9/18/2021   Cholecalciferol (VITAMIN D-3 PO)   Yes No   Sig: Take 2,000 mg by mouth daily   Patient not taking: Reported on 9/18/2021   MELOXICAM PO   Yes No   Sig: Take 15 mg by mouth daily   Patient not taking: Reported on 9/18/2021   Multiple Vitamins-Minerals (ONE-A-DAY WOMENS PO)   Yes No   Sig: Take by mouth 18 mg, 100 mcg, 25 mcg   Patient not taking: Reported on 9/18/2021   OMEPRAZOLE PO   Yes No   Sig: Take 20 mg by mouth daily   Patient not taking: Reported on 9/18/2021   Probiotic Product (PROBIOTIC PO)   Yes No   Sig: Take by mouth daily   Promethazine-DM (PHENERGAN-DM) 6 25-15 mg/5 mL oral syrup   No No   Sig: Take 5 mL by mouth 4 (four) times a day as needed for cough   Patient not taking: Reported on 5/21/2020   levothyroxine 150 mcg tablet   Yes No   Sig: Take 150 mcg by mouth daily   Patient not taking: Reported on 9/18/2021   methylPREDNISolone 4 MG tablet therapy pack   No No   Sig: Use as directed on package   Patient not taking: Reported on 5/21/2020      Facility-Administered Medications: None       History reviewed  No pertinent past medical history  History reviewed   No pertinent surgical history  History reviewed  No pertinent family history  I have reviewed and agree with the history as documented  E-Cigarette/Vaping    E-Cigarette Use Never User      E-Cigarette/Vaping Substances    Nicotine No     THC No     CBD No     Flavoring No     Other No     Unknown No      Social History     Tobacco Use    Smoking status: Never Smoker    Smokeless tobacco: Never Used   Vaping Use    Vaping Use: Never used   Substance Use Topics    Alcohol use: Yes    Drug use: Never       Review of Systems   Constitutional: Negative  HENT: Positive for dental problem and facial swelling  Eyes: Negative  Respiratory: Negative  Cardiovascular: Negative  Gastrointestinal: Negative  Endocrine: Negative  Genitourinary: Negative  Musculoskeletal: Negative  Skin: Negative  Allergic/Immunologic: Negative  Neurological: Negative  Hematological: Negative  Psychiatric/Behavioral: Negative  All other systems reviewed and are negative  Physical Exam  Physical Exam  Constitutional:       Appearance: Normal appearance  HENT:      Head: Normocephalic and atraumatic  Comments: Left lower tooth, dental swelling edema tenderness  Cannot open her mouth completely, facial edema noted  No submandibular edema noted  Nose: Nose normal       Mouth/Throat:      Mouth: Mucous membranes are moist    Eyes:      Extraocular Movements: Extraocular movements intact  Pupils: Pupils are equal, round, and reactive to light  Cardiovascular:      Rate and Rhythm: Normal rate and regular rhythm  Pulmonary:      Effort: Pulmonary effort is normal       Breath sounds: Normal breath sounds  Abdominal:      General: Abdomen is flat  Bowel sounds are normal       Palpations: Abdomen is soft  Musculoskeletal:         General: Normal range of motion  Cervical back: Normal range of motion and neck supple  Skin:     General: Skin is warm        Capillary Refill: Capillary refill takes less than 2 seconds  Neurological:      General: No focal deficit present  Mental Status: She is alert and oriented to person, place, and time  Mental status is at baseline  Psychiatric:         Mood and Affect: Mood normal          Thought Content: Thought content normal          Vital Signs  ED Triage Vitals [09/21/21 0949]   Temperature Pulse Respirations Blood Pressure SpO2   98 4 °F (36 9 °C) 70 16 108/74 98 %      Temp src Heart Rate Source Patient Position - Orthostatic VS BP Location FiO2 (%)   -- -- -- -- --      Pain Score       Worst Possible Pain           Vitals:    09/21/21 0949   BP: 108/74   Pulse: 70         Visual Acuity      ED Medications  Medications   ampicillin-sulbactam (UNASYN) 3 g in sodium chloride 0 9 % 100 mL IVPB (0 g Intravenous Stopped 9/21/21 1143)   dexamethasone (DECADRON) injection 10 mg (10 mg Intravenous Given 9/21/21 1049)   morphine (PF) 4 mg/mL injection 4 mg (4 mg Intravenous Given 9/21/21 1046)   ondansetron (ZOFRAN) injection 4 mg (4 mg Intravenous Given 9/21/21 1044)   iohexol (OMNIPAQUE) 350 MG/ML injection (SINGLE-DOSE) 85 mL (85 mL Intravenous Given 9/21/21 1202)       Diagnostic Studies  Results Reviewed     Procedure Component Value Units Date/Time    Novel Coronavirus [facility] [[id number]]  (Normal) Collected: 09/21/21 1317    Lab Status: Final result Specimen: Nares from Nose Updated: 09/21/21 1420     SARS-CoV-2 Negative    Narrative: The specimen collection materials, transport medium, and/or testing methodology utilized in the production of these test results have been proven to be reliable in a limited validation with an abbreviated program under the Emergency Utilization Authorization provided by the FDA  Testing reported as "Presumptive positive" will be confirmed with secondary testing to ensure result accuracy    Clinical caution and judgement should be used with the interpretation of these results with consideration of the clinical impression and other laboratory testing  Testing reported as "Positive" or "Negative" has been proven to be accurate according to standard laboratory validation requirements  All testing is performed with control materials showing appropriate reactivity at standard intervals        POCT pregnancy, urine [[id number]]  (Normal) Resulted: 09/21/21 1220    Lab Status: Final result Updated: 09/21/21 1220     EXT PREG TEST UR (Ref: Negative) negative     Control valid    Comprehensive metabolic panel [[id number]]  (Abnormal) Collected: 09/21/21 1104    Lab Status: Final result Specimen: Blood from Arm, Left Updated: 09/21/21 1125     Sodium 134 mmol/L      Potassium 4 1 mmol/L      Chloride 97 mmol/L      CO2 29 mmol/L      ANION GAP 8 mmol/L      BUN 10 mg/dL      Creatinine 0 83 mg/dL      Glucose 104 mg/dL      Calcium 8 5 mg/dL      AST 55 U/L      ALT 67 U/L      Alkaline Phosphatase 48 U/L      Total Protein 7 1 g/dL      Albumin 3 7 g/dL      Total Bilirubin 0 49 mg/dL      eGFR 92 ml/min/1 73sq m     Narrative:      [place] guidelines for Chronic Kidney Disease (CKD):     Stage 1 with normal or high GFR (GFR > 90 mL/min/1 73 square meters)    Stage 2 Mild CKD (GFR = 60-89 mL/min/1 73 square meters)    Stage 3A Moderate CKD (GFR = 45-59 mL/min/1 73 square meters)    Stage 3B Moderate CKD (GFR = 30-44 mL/min/1 73 square meters)    Stage 4 Severe CKD (GFR = 15-29 mL/min/1 73 square meters)    Stage 5 End Stage CKD (GFR <15 mL/min/1 73 square meters)  Note: GFR calculation is accurate only with a steady state creatinine    CBC and differential [[id number]]  (Abnormal) Collected: 09/21/21 1104    Lab Status: Final result Specimen: Blood from Arm, Left Updated: 09/21/21 1109     WBC 6 42 Thousand/uL      RBC 3 78 Million/uL      Hemoglobin 12 8 g/dL      Hematocrit 37 9 %       fL      MCH 33 9 pg      MCHC 33 8 g/dL      RDW 13 3 %      MPV 10 2 fL Platelets 989 Thousands/uL      nRBC 0 /100 WBCs      Neutrophils Relative 75 %      Immat GRANS % 0 %      Lymphocytes Relative 15 %      Monocytes Relative 10 %      Eosinophils Relative 0 %      Basophils Relative 0 %      Neutrophils Absolute 4 79 Thousands/µL      Immature Grans Absolute 0 02 Thousand/uL      Lymphocytes Absolute 0 93 Thousands/µL      Monocytes Absolute 0 65 Thousand/µL      Eosinophils Absolute 0 01 Thousand/µL      Basophils Absolute 0 02 Thousands/µL                  CT soft tissue neck with contrast   Final Result by Mallissa Goodpasture, MD (09/21 1223)      3 6 cm abscess in left sublingual space  1 4 cm abscess in left medial pterygoid muscle  Diffuse inflammatory changes in left sublingual, left , and left submandibular spaces, likely related to infection  Workstation performed: [id number]                    Procedures  CriticalCare Time  Performed by: Lili Webb DO  Authorized by: Lili Webb DO     Critical care provider statement:     Critical care time (minutes):  45    Critical care was necessary to treat or prevent imminent or life-threatening deterioration of the following conditions: dental abscess      Critical care was time spent personally by me on the following activities:  Blood draw for specimens, obtaining history from patient or surrogate, development of treatment plan with patient or surrogate, discussions with consultants, evaluation of patient's response to treatment, examination of patient, interpretation of cardiac output measurements, ordering and performing treatments and interventions, ordering and review of laboratory studies, ordering and review of radiographic studies, re-evaluation of patient's condition and review of old charts    I assumed direction of critical care for this patient from another provider in my specialty: no               ED Course                             SBIRT 20yo+      Most Recent Value   SBIRT (22 yo +)   In order to provide better care to our patients, we are screening all of our patients for alcohol and drug use  Would it be okay to ask you these screening questions? Yes Filed at: 09/21/2021 1003   Initial Alcohol Screen: US AUDIT-C    1  How often do you have a drink containing alcohol?  0 Filed at: 09/21/2021 1003   2  How many drinks containing alcohol do you have on a typical day you are drinking? 0 Filed at: 09/21/2021 1003   3a  Male UNDER 65: How often do you have five or more drinks on one occasion? 0 Filed at: 09/21/2021 1003   3b  FEMALE Any Age, or MALE 65+: How often do you have 4 or more drinks on one occassion? 0 Filed at: 09/21/2021 1003   Audit-C Score  0 Filed at: 09/21/2021 1003   CONRADO: How many times in the past year have you    Used an illegal drug or used a prescription medication for non-medical reasons? Never Filed at: 09/21/2021 1003                    MDM  Number of Diagnoses or Management Options  Dental abscess  Diagnosis management comments: Spoke to dr Bessy Claire, oral surgeon          Amount and/or Complexity of Data Reviewed  Clinical lab tests: ordered and reviewed  Tests in the radiology section of CPT®: ordered and reviewed  Tests in the medicine section of CPT®: ordered and reviewed    Patient Progress  Patient progress: stable      Disposition  Final diagnoses:   Dental abscess     Time reflects when diagnosis was documented in both MDM as applicable and the Disposition within this note     Time User Action Codes Description Comment    9/21/2021  1:01 PM Abram Garcia Add [K04 7] Dental abscess       ED Disposition     ED Disposition Condition Date/Time Comment    Transfer to Another [address] Sep 21, 2021  1:55 PM Melinda Silva should be transferred out to [facility]      MD Documentation      Most Recent Value   Patient Condition  The patient has been stabilized such that within reasonable medical probability, no material deterioration of the patient condition or the condition of the unborn child(johanny) is likely to result from the transfer   Reason for Transfer  Level of Care needed not available at this facility   Benefits of Transfer  Specialized equipment and/or services available at the receiving facility (Include comment)________________________   Risks of Transfer  Potential for delay in receiving treatment   Accepting Physician  dr aSwyer Christy NameRenetta Both   Sending MD dr lezama   Provider Certification  General risk, such as traffic hazards, adverse weather conditions, rough terrain or turbulence, possible failure of equipment (including vehicle or aircraft), or consequences of actions of persons outside the control of the transport personnel      RN Documentation      [address] Name, Renetta Mcclelland   Level of Care  Advanced life support      Follow-up Information    None         Discharge Medication List as of 9/21/2021  4:04 PM      CONTINUE these medications which have NOT CHANGED    Details   Calcium Carb-Cholecalciferol (CALCIUM 600+D3 PO) Take by mouth 2 (two) times a day, Historical Med      Cholecalciferol (VITAMIN D-3 PO) Take 2,000 mg by mouth daily, Historical Med      levothyroxine 150 mcg tablet Take 150 mcg by mouth daily, Historical Med      MELOXICAM PO Take 15 mg by mouth daily, Historical Med      methylPREDNISolone 4 MG tablet therapy pack Use as directed on package, Normal      Multiple Vitamins-Minerals (ONE-A-DAY WOMENS PO) Take by mouth 18 mg, 100 mcg, 25 mcg, Historical Med      OMEPRAZOLE PO Take 20 mg by mouth daily, Historical Med      Probiotic Product (PROBIOTIC PO) Take by mouth daily, Historical Med      Promethazine-DM (PHENERGAN-DM) 6 25-15 mg/5 mL oral syrup Take 5 mL by mouth 4 (four) times a day as needed for cough, Starting Tue 3/10/2020, Normal           No discharge procedures on file      PDMP Review     None          ED Provider  Electronically Signed by           Nya Schmidt DO  09/21/21 3023

## 2021-09-21 NOTE — EMTALA/ACUTE CARE TRANSFER
[address] [address]  Dept: [phone]      EMTALA TRANSFER CONSENT    NAME Bruce Saeed                                         1985                              MRN [id number]    I have been informed of my rights regarding examination, treatment, and transfer   by Dr Lili Webb DO    Benefits: Specialized equipment and/or services available at the receiving facility (Include comment)________________________    Risks: Potential for delay in receiving treatment      Consent for Transfer:  I acknowledge that my medical condition has been evaluated and explained to me by the emergency department physician or other qualified medical person and/or my attending physician, who has recommended that I be transferred to the service of  Accepting Physician: dr Ran Tariq at [address] [place] [address] Name, [address] : [facility]  The above potential benefits of such transfer, the potential risks associated with such transfer, and the probable risks of not being transferred have been explained to me, and I fully understand them  The doctor has explained that, in my case, the benefits of transfer outweigh the risks  I agree to be transferred  I authorize the performance of emergency medical procedures and treatments upon me in both transit and upon arrival at the receiving facility  Additionally, I authorize the release of any and all medical records to the receiving facility and request they be transported with me, if possible  I understand that the safest mode of transportation during a medical emergency is an ambulance and that the Hospital advocates the use of this mode of transport  Risks of traveling to the receiving facility by car, including absence of medical control, life sustaining equipment, such as oxygen, and medical personnel has been explained to me and I fully understand them      (MAGED CORRECT BOX BELOW)  [  ]  I consent to the stated transfer and to be transported by ambulance/helicopter  [  ]  I consent to the stated transfer, but refuse transportation by ambulance and accept full responsibility for my transportation by car  I understand the risks of non-ambulance transfers and I exonerate the Hospital and its staff from any deterioration in my condition that results from this refusal     X___________________________________________    DATE  21  TIME________  Signature of patient or legally responsible individual signing on patient behalf           RELATIONSHIP TO PATIENT_________________________          Provider Certification    NAME Caitlin Barbsoa                                         1985                              MRN [id number]    A medical screening exam was performed on the above named patient  Based on the examination:    Condition Necessitating Transfer The encounter diagnosis was Dental abscess  Patient Condition: The patient has been stabilized such that within reasonable medical probability, no material deterioration of the patient condition or the condition of the unborn child(johanny) is likely to result from the transfer    Reason for Transfer: Level of Care needed not available at this facility    Transfer Requirements:  [address]   · Space available and qualified personnel available for treatment as acknowledged by    · Agreed to accept transfer and to provide appropriate medical treatment as acknowledged by       dr Deb Bledsoe  · Appropriate medical records of the examination and treatment of the patient are provided at the time of transfer   [address], [address] _______  · Transfer will be performed by qualified personnel from    and appropriate transfer equipment as required, including the use of necessary and appropriate life support measures      Provider Certification: I have examined the patient and explained the following risks and benefits of being transferred/refusing transfer to the patient/family:  General risk, such as traffic hazards, adverse weather conditions, rough terrain or turbulence, possible failure of equipment (including vehicle or aircraft), or consequences of actions of persons outside the control of the transport personnel      Based on these reasonable risks and benefits to the patient and/or the unborn child(johanny), and based upon the information available at the time of the patients examination, I certify that the medical benefits reasonably to be expected from the provision of appropriate medical treatments at another medical facility outweigh the increasing risks, if any, to the individuals medical condition, and in the case of labor to the unborn child, from effecting the transfer      X____________________________________________ DATE 09/21/21        TIME_______      ORIGINAL - SEND TO MEDICAL RECORDS   COPY - SEND WITH PATIENT DURING TRANSFER

## 2021-09-21 NOTE — ASSESSMENT & PLAN NOTE
POA  dental pain infected tooth left lower jaw, molar since last Thursday  Went to dentist on Friday who started on clindamycin and pain meds  Went to urgent care on Saturday  On Monday she revisted dentist who doubled clinda and scheduled root canal for this Friday  Patient completed 5 days of clindamycin prior to arrival (9/17 to 9/21)    CT head: 3 6 cm abscess in left sublingual space  1 4 cm abscess in left medial pterygoid muscle  Diffuse inflammatory changes in left sublingual, left , and left submandibular spaces, likely related to infection        Plan:  · Continue Unasyn  · [facility] consulted  · Pain control  · Monitor for airway compromise

## 2021-09-21 NOTE — EMTALA/ACUTE CARE TRANSFER
[address] [facility] [address]  [place] [address]  Dept: [phone]      EMTALA TRANSFER CONSENT    NAME Virginia Das                                         1985                              MRN [id number]    I have been informed of my rights regarding examination, treatment, and transfer   by Dr oDyle Hanley DO    Benefits: Specialized equipment and/or services available at the receiving facility (Include comment)________________________    Risks: Potential for delay in receiving treatment      Consent for Transfer:  I acknowledge that my medical condition has been evaluated and explained to me by the emergency department physician or other qualified medical person and/or my attending physician, who has recommended that I be transferred to the service of  Accepting Physician: Dr Sarai Kennedy at [address] [place] [address] Name, [address] : [facility]  The above potential benefits of such transfer, the potential risks associated with such transfer, and the probable risks of not being transferred have been explained to me, and I fully understand them  The doctor has explained that, in my case, the benefits of transfer outweigh the risks  I agree to be transferred  I authorize the performance of emergency medical procedures and treatments upon me in both transit and upon arrival at the receiving facility  Additionally, I authorize the release of any and all medical records to the receiving facility and request they be transported with me, if possible  I understand that the safest mode of transportation during a medical emergency is an ambulance and that the Hospital advocates the use of this mode of transport  Risks of traveling to the receiving facility by car, including absence of medical control, life sustaining equipment, such as oxygen, and medical personnel has been explained to me and I fully understand them      (MAGED CORRECT BOX BELOW)  [  ]  I consent to the stated transfer and to be transported by ambulance/helicopter  [  ]  I consent to the stated transfer, but refuse transportation by ambulance and accept full responsibility for my transportation by car  I understand the risks of non-ambulance transfers and I exonerate the Hospital and its staff from any deterioration in my condition that results from this refusal     X___________________________________________    DATE  21  TIME________  Signature of patient or legally responsible individual signing on patient behalf           RELATIONSHIP TO PATIENT_________________________          Provider Certification    NAME Felipe Santiago                                         1985                              MRN [id number]    A medical screening exam was performed on the above named patient  Based on the examination:    Condition Necessitating Transfer The encounter diagnosis was Dental abscess  Patient Condition: The patient has been stabilized such that within reasonable medical probability, no material deterioration of the patient condition or the condition of the unborn child(johanny) is likely to result from the transfer    Reason for Transfer: Level of Care needed not available at this facility    Transfer Requirements: [address]   · Space available and qualified personnel available for treatment as acknowledged by    · Agreed to accept transfer and to provide appropriate medical treatment as acknowledged by       Dr Wilbur Stovall  · Appropriate medical records of the examination and treatment of the patient are provided at the time of transfer   [address], [address] _______  · Transfer will be performed by qualified personnel from    and appropriate transfer equipment as required, including the use of necessary and appropriate life support measures      Provider Certification: I have examined the patient and explained the following risks and benefits of being transferred/refusing transfer to the patient/family:  General risk, such as traffic hazards, adverse weather conditions, rough terrain or turbulence, possible failure of equipment (including vehicle or aircraft), or consequences of actions of persons outside the control of the transport personnel      Based on these reasonable risks and benefits to the patient and/or the unborn child(johanny), and based upon the information available at the time of the patients examination, I certify that the medical benefits reasonably to be expected from the provision of appropriate medical treatments at another medical facility outweigh the increasing risks, if any, to the individuals medical condition, and in the case of labor to the unborn child, from effecting the transfer      X____________________________________________ DATE 09/21/21        TIME_______      ORIGINAL - SEND TO MEDICAL RECORDS   COPY - SEND WITH PATIENT DURING TRANSFER

## 2021-09-22 ENCOUNTER — ANESTHESIA (INPATIENT)
Dept: PERIOP | Facility: HOSPITAL | Age: 36
DRG: 158 | End: 2021-09-22
Payer: COMMERCIAL

## 2021-09-22 ENCOUNTER — ANESTHESIA EVENT (INPATIENT)
Dept: PERIOP | Facility: HOSPITAL | Age: 36
DRG: 158 | End: 2021-09-22
Payer: COMMERCIAL

## 2021-09-22 LAB
ALBUMIN SERPL BCP-MCNC: 3.3 G/DL (ref 3.5–5)
ALP SERPL-CCNC: 48 U/L (ref 46–116)
ALT SERPL W P-5'-P-CCNC: 64 U/L (ref 12–78)
ANION GAP SERPL CALCULATED.3IONS-SCNC: 4 MMOL/L (ref 4–13)
AST SERPL W P-5'-P-CCNC: 44 U/L (ref 5–45)
BILIRUB SERPL-MCNC: 0.41 MG/DL (ref 0.2–1)
BUN SERPL-MCNC: 8 MG/DL (ref 5–25)
CALCIUM ALBUM COR SERPL-MCNC: 8.9 MG/DL (ref 8.3–10.1)
CALCIUM SERPL-MCNC: 8.3 MG/DL (ref 8.3–10.1)
CHLORIDE SERPL-SCNC: 97 MMOL/L (ref 100–108)
CO2 SERPL-SCNC: 30 MMOL/L (ref 21–32)
CREAT SERPL-MCNC: 0.83 MG/DL (ref 0.6–1.3)
ERYTHROCYTE [DISTWIDTH] IN BLOOD BY AUTOMATED COUNT: 13.3 % (ref 11.6–15.1)
GFR SERPL CREATININE-BSD FRML MDRD: 92 ML/MIN/1.73SQ M
GLUCOSE SERPL-MCNC: 109 MG/DL (ref 65–140)
HCT VFR BLD AUTO: 37.6 % (ref 34.8–46.1)
HGB BLD-MCNC: 12.9 G/DL (ref 11.5–15.4)
MCH RBC QN AUTO: 33.9 PG (ref 26.8–34.3)
MCHC RBC AUTO-ENTMCNC: 34.3 G/DL (ref 31.4–37.4)
MCV RBC AUTO: 99 FL (ref 82–98)
PLATELET # BLD AUTO: 226 THOUSANDS/UL (ref 149–390)
PMV BLD AUTO: 9.9 FL (ref 8.9–12.7)
POTASSIUM SERPL-SCNC: 4 MMOL/L (ref 3.5–5.3)
PROT SERPL-MCNC: 6.8 G/DL (ref 6.4–8.2)
RBC # BLD AUTO: 3.8 MILLION/UL (ref 3.81–5.12)
SODIUM SERPL-SCNC: 131 MMOL/L (ref 136–145)
WBC # BLD AUTO: 8.28 THOUSAND/UL (ref 4.31–10.16)

## 2021-09-22 PROCEDURE — 85027 COMPLETE CBC AUTOMATED: CPT

## 2021-09-22 PROCEDURE — 87205 SMEAR GRAM STAIN: CPT | Performed by: DENTIST

## 2021-09-22 PROCEDURE — 0J9500Z DRAINAGE OF LEFT NECK SUBCUTANEOUS TISSUE AND FASCIA WITH DRAINAGE DEVICE, OPEN APPROACH: ICD-10-PCS | Performed by: DENTIST

## 2021-09-22 PROCEDURE — 87076 CULTURE ANAEROBE IDENT EACH: CPT | Performed by: DENTIST

## 2021-09-22 PROCEDURE — 87077 CULTURE AEROBIC IDENTIFY: CPT | Performed by: DENTIST

## 2021-09-22 PROCEDURE — 87181 SC STD AGAR DILUTION PER AGT: CPT | Performed by: DENTIST

## 2021-09-22 PROCEDURE — 87185 SC STD ENZYME DETCJ PER NZM: CPT | Performed by: DENTIST

## 2021-09-22 PROCEDURE — 0CDXXZ0 EXTRACTION OF LOWER TOOTH, SINGLE, EXTERNAL APPROACH: ICD-10-PCS | Performed by: DENTIST

## 2021-09-22 PROCEDURE — 87075 CULTR BACTERIA EXCEPT BLOOD: CPT | Performed by: DENTIST

## 2021-09-22 PROCEDURE — 99232 SBSQ HOSP IP/OBS MODERATE 35: CPT | Performed by: HOSPITALIST

## 2021-09-22 PROCEDURE — 80053 COMPREHEN METABOLIC PANEL: CPT

## 2021-09-22 PROCEDURE — 88300 SURGICAL PATH GROSS: CPT | Performed by: PATHOLOGY

## 2021-09-22 PROCEDURE — 87070 CULTURE OTHR SPECIMN AEROBIC: CPT | Performed by: DENTIST

## 2021-09-22 RX ORDER — BUPIVACAINE HYDROCHLORIDE 2.5 MG/ML
INJECTION, SOLUTION EPIDURAL; INFILTRATION; INTRACAUDAL AS NEEDED
Status: DISCONTINUED | OUTPATIENT
Start: 2021-09-22 | End: 2021-09-22 | Stop reason: HOSPADM

## 2021-09-22 RX ORDER — ALBUTEROL SULFATE 2.5 MG/3ML
2.5 SOLUTION RESPIRATORY (INHALATION) ONCE AS NEEDED
Status: DISCONTINUED | OUTPATIENT
Start: 2021-09-22 | End: 2021-09-22 | Stop reason: HOSPADM

## 2021-09-22 RX ORDER — PROPOFOL 10 MG/ML
INJECTION, EMULSION INTRAVENOUS AS NEEDED
Status: DISCONTINUED | OUTPATIENT
Start: 2021-09-22 | End: 2021-09-22

## 2021-09-22 RX ORDER — HYDROMORPHONE HCL/PF 1 MG/ML
0.5 SYRINGE (ML) INJECTION EVERY 4 HOURS PRN
Status: DISCONTINUED | OUTPATIENT
Start: 2021-09-22 | End: 2021-09-22

## 2021-09-22 RX ORDER — ONDANSETRON 2 MG/ML
4 INJECTION INTRAMUSCULAR; INTRAVENOUS ONCE AS NEEDED
Status: DISCONTINUED | OUTPATIENT
Start: 2021-09-22 | End: 2021-09-22 | Stop reason: HOSPADM

## 2021-09-22 RX ORDER — SUCCINYLCHOLINE/SOD CL,ISO/PF 100 MG/5ML
SYRINGE (ML) INTRAVENOUS AS NEEDED
Status: DISCONTINUED | OUTPATIENT
Start: 2021-09-22 | End: 2021-09-22

## 2021-09-22 RX ORDER — DIPHENHYDRAMINE HYDROCHLORIDE 50 MG/ML
25 INJECTION INTRAMUSCULAR; INTRAVENOUS ONCE AS NEEDED
Status: DISCONTINUED | OUTPATIENT
Start: 2021-09-22 | End: 2021-09-23 | Stop reason: HOSPADM

## 2021-09-22 RX ORDER — LANOLIN ALCOHOL/MO/W.PET/CERES
3 CREAM (GRAM) TOPICAL
Status: DISCONTINUED | OUTPATIENT
Start: 2021-09-22 | End: 2021-09-23 | Stop reason: HOSPADM

## 2021-09-22 RX ORDER — MAGNESIUM HYDROXIDE 1200 MG/15ML
LIQUID ORAL AS NEEDED
Status: DISCONTINUED | OUTPATIENT
Start: 2021-09-22 | End: 2021-09-22 | Stop reason: HOSPADM

## 2021-09-22 RX ORDER — METHYLPREDNISOLONE SODIUM SUCCINATE 125 MG/2ML
60 INJECTION, POWDER, LYOPHILIZED, FOR SOLUTION INTRAMUSCULAR; INTRAVENOUS ONCE
Status: COMPLETED | OUTPATIENT
Start: 2021-09-22 | End: 2021-09-22

## 2021-09-22 RX ORDER — HYDROMORPHONE HCL/PF 1 MG/ML
1 SYRINGE (ML) INJECTION EVERY 4 HOURS PRN
Status: DISCONTINUED | OUTPATIENT
Start: 2021-09-22 | End: 2021-09-23

## 2021-09-22 RX ORDER — LIDOCAINE HYDROCHLORIDE 10 MG/ML
INJECTION, SOLUTION EPIDURAL; INFILTRATION; INTRACAUDAL; PERINEURAL AS NEEDED
Status: DISCONTINUED | OUTPATIENT
Start: 2021-09-22 | End: 2021-09-22

## 2021-09-22 RX ORDER — HYDROMORPHONE HYDROCHLORIDE 2 MG/1
2 TABLET ORAL EVERY 4 HOURS PRN
Status: DISCONTINUED | OUTPATIENT
Start: 2021-09-22 | End: 2021-09-22

## 2021-09-22 RX ORDER — FENTANYL CITRATE/PF 50 MCG/ML
25 SYRINGE (ML) INJECTION
Status: DISCONTINUED | OUTPATIENT
Start: 2021-09-22 | End: 2021-09-22 | Stop reason: HOSPADM

## 2021-09-22 RX ORDER — SODIUM CHLORIDE, SODIUM LACTATE, POTASSIUM CHLORIDE, CALCIUM CHLORIDE 600; 310; 30; 20 MG/100ML; MG/100ML; MG/100ML; MG/100ML
INJECTION, SOLUTION INTRAVENOUS CONTINUOUS PRN
Status: DISCONTINUED | OUTPATIENT
Start: 2021-09-22 | End: 2021-09-22

## 2021-09-22 RX ORDER — NALOXONE HYDROCHLORIDE 1 MG/ML
2 INJECTION INTRAMUSCULAR; INTRAVENOUS; SUBCUTANEOUS ONCE AS NEEDED
Status: DISCONTINUED | OUTPATIENT
Start: 2021-09-22 | End: 2021-09-22

## 2021-09-22 RX ORDER — DIPHENHYDRAMINE HYDROCHLORIDE 50 MG/ML
12.5 INJECTION INTRAMUSCULAR; INTRAVENOUS ONCE AS NEEDED
Status: DISCONTINUED | OUTPATIENT
Start: 2021-09-22 | End: 2021-09-22 | Stop reason: HOSPADM

## 2021-09-22 RX ORDER — FENTANYL CITRATE 50 UG/ML
INJECTION, SOLUTION INTRAMUSCULAR; INTRAVENOUS AS NEEDED
Status: DISCONTINUED | OUTPATIENT
Start: 2021-09-22 | End: 2021-09-22

## 2021-09-22 RX ORDER — LIDOCAINE HYDROCHLORIDE AND EPINEPHRINE 10; 10 MG/ML; UG/ML
INJECTION, SOLUTION INFILTRATION; PERINEURAL AS NEEDED
Status: DISCONTINUED | OUTPATIENT
Start: 2021-09-22 | End: 2021-09-22 | Stop reason: HOSPADM

## 2021-09-22 RX ORDER — DEXAMETHASONE SODIUM PHOSPHATE 10 MG/ML
INJECTION, SOLUTION INTRAMUSCULAR; INTRAVENOUS AS NEEDED
Status: DISCONTINUED | OUTPATIENT
Start: 2021-09-22 | End: 2021-09-22

## 2021-09-22 RX ORDER — MIDAZOLAM HYDROCHLORIDE 2 MG/2ML
INJECTION, SOLUTION INTRAMUSCULAR; INTRAVENOUS AS NEEDED
Status: DISCONTINUED | OUTPATIENT
Start: 2021-09-22 | End: 2021-09-22

## 2021-09-22 RX ORDER — HYDROMORPHONE HCL IN WATER/PF 6 MG/30 ML
0.2 PATIENT CONTROLLED ANALGESIA SYRINGE INTRAVENOUS
Status: DISCONTINUED | OUTPATIENT
Start: 2021-09-22 | End: 2021-09-22 | Stop reason: HOSPADM

## 2021-09-22 RX ADMIN — PROPOFOL 200 MG: 10 INJECTION, EMULSION INTRAVENOUS at 17:01

## 2021-09-22 RX ADMIN — DEXAMETHASONE SODIUM PHOSPHATE 4 MG: 10 INJECTION, SOLUTION INTRAMUSCULAR; INTRAVENOUS at 17:05

## 2021-09-22 RX ADMIN — LIDOCAINE HYDROCHLORIDE 50 MG: 10 INJECTION, SOLUTION EPIDURAL; INFILTRATION; INTRACAUDAL at 17:01

## 2021-09-22 RX ADMIN — AMPICILLIN SODIUM AND SULBACTAM SODIUM 3 G: 10; 5 INJECTION, POWDER, FOR SOLUTION INTRAVENOUS at 05:19

## 2021-09-22 RX ADMIN — ONDANSETRON 4 MG: 2 INJECTION INTRAMUSCULAR; INTRAVENOUS at 17:05

## 2021-09-22 RX ADMIN — FENTANYL CITRATE 25 MCG: 50 INJECTION INTRAMUSCULAR; INTRAVENOUS at 18:03

## 2021-09-22 RX ADMIN — Medication 100 MG: at 17:01

## 2021-09-22 RX ADMIN — AMPICILLIN SODIUM AND SULBACTAM SODIUM 3 G: 10; 5 INJECTION, POWDER, FOR SOLUTION INTRAVENOUS at 18:11

## 2021-09-22 RX ADMIN — AMPICILLIN SODIUM AND SULBACTAM SODIUM 3 G: 10; 5 INJECTION, POWDER, FOR SOLUTION INTRAVENOUS at 12:07

## 2021-09-22 RX ADMIN — FENTANYL CITRATE 25 MCG: 50 INJECTION, SOLUTION INTRAMUSCULAR; INTRAVENOUS at 17:05

## 2021-09-22 RX ADMIN — AMPICILLIN SODIUM AND SULBACTAM SODIUM 3 G: 10; 5 INJECTION, POWDER, FOR SOLUTION INTRAVENOUS at 23:22

## 2021-09-22 RX ADMIN — OXYCODONE HYDROCHLORIDE 10 MG: 5 SOLUTION ORAL at 03:57

## 2021-09-22 RX ADMIN — FENTANYL CITRATE 50 MCG: 50 INJECTION, SOLUTION INTRAMUSCULAR; INTRAVENOUS at 17:01

## 2021-09-22 RX ADMIN — OXYCODONE HYDROCHLORIDE 5 MG: 5 SOLUTION ORAL at 00:49

## 2021-09-22 RX ADMIN — OXYCODONE HYDROCHLORIDE 5 MG: 5 SOLUTION ORAL at 20:32

## 2021-09-22 RX ADMIN — METHYLPREDNISOLONE SODIUM SUCCINATE 60 MG: 125 INJECTION, POWDER, FOR SOLUTION INTRAMUSCULAR; INTRAVENOUS at 08:54

## 2021-09-22 RX ADMIN — HYDROMORPHONE HYDROCHLORIDE 1 MG: 1 INJECTION, SOLUTION INTRAMUSCULAR; INTRAVENOUS; SUBCUTANEOUS at 18:43

## 2021-09-22 RX ADMIN — FENTANYL CITRATE 25 MCG: 50 INJECTION, SOLUTION INTRAMUSCULAR; INTRAVENOUS at 17:11

## 2021-09-22 RX ADMIN — HYDROMORPHONE HYDROCHLORIDE 2 MG: 2 TABLET ORAL at 05:19

## 2021-09-22 RX ADMIN — FENTANYL CITRATE 25 MCG: 50 INJECTION INTRAMUSCULAR; INTRAVENOUS at 17:54

## 2021-09-22 RX ADMIN — Medication 3 MG: at 21:50

## 2021-09-22 RX ADMIN — MIDAZOLAM HYDROCHLORIDE 2 MG: 1 INJECTION, SOLUTION INTRAMUSCULAR; INTRAVENOUS at 16:57

## 2021-09-22 RX ADMIN — SODIUM CHLORIDE, SODIUM LACTATE, POTASSIUM CHLORIDE, AND CALCIUM CHLORIDE: .6; .31; .03; .02 INJECTION, SOLUTION INTRAVENOUS at 16:54

## 2021-09-22 NOTE — SPEECH THERAPY NOTE
Speech Language/Pathology    Speech/Language Pathology Progress Note    Patient Name: Hill Gibbs  IQRQO'Y Date: 9/22/2021     Consult rec'd for swallow eval  OMS consult reviewed, pt is tent for OR today and needs to remain NPO  Will hold today and plan for eval tomorrow as able, as appropriate       Kali Espinal CCC-SLP  Speech Pathologist  Available via  tiger text

## 2021-09-22 NOTE — ASSESSMENT & PLAN NOTE
POA  dental pain infected tooth left lower jaw, molar since last Thursday  Went to dentist on Friday who started on clindamycin and pain meds  Went to urgent care on Saturday  On Monday she revisted dentist who doubled clinda and scheduled root canal for this Friday  Patient completed 5 days of clindamycin prior to arrival (9/17 to 9/21)    CT head: 3 6 cm abscess in left sublingual space  1 4 cm abscess in left medial pterygoid muscle  Diffuse inflammatory changes in left sublingual, left , and left submandibular spaces, likely related to infection        Plan:  · Continue IV Unasyn  · OMSF consulted  · I&D scheduled for today 9/22  · discharge on Augmentin 875/125mg PO BID for total of 7 days  · Follow-up in clinic within 1-2 weeks of discharge  · Discharge with Peridex 15 mL swish and spit b i d  X7 days  · Continue IV Solu-Medrol and monitor for airway compromise  · Pain control

## 2021-09-22 NOTE — ASSESSMENT & PLAN NOTE
POA   Likely due to pain and poor intake    Lab Results   Component Value Date    SODIUM 131 (L) 09/22/2021    SODIUM 134 (L) 09/21/2021         Plan:  · Pain control  · Monitor a m   Labs

## 2021-09-22 NOTE — ANESTHESIA PREPROCEDURE EVALUATION
Procedure:  INCISION AND DRAINAGE (I&D) HEAD/FACE and extraction of tooth #18 (Left Face)    Relevant Problems   No relevant active problems

## 2021-09-22 NOTE — PROGRESS NOTES
[facility]  Progress Note Jannet Chris 1985, 30 y o  female MRN: [id number]  Unit/Bed#: W -69 Encounter: [id number]  Primary Care Provider: Faith Griffith MD   Date and time admitted to hospital: 9/21/2021  4:32 PM    * Dental abscess  Assessment & Plan  POA  dental pain infected tooth left lower jaw, molar since last Thursday  Went to dentist on Friday who started on clindamycin and pain meds  Went to urgent care on Saturday  On Monday she revisted dentist who doubled clinda and scheduled root canal for this Friday  Patient completed 5 days of clindamycin prior to arrival (9/17 to 9/21)    CT head: 3 6 cm abscess in left sublingual space  1 4 cm abscess in left medial pterygoid muscle  Diffuse inflammatory changes in left sublingual, left , and left submandibular spaces, likely related to infection  Plan:  · Continue IV Unasyn  · OMSF consulted  · I&D scheduled for today 9/22  · discharge on Augmentin 875/125mg PO BID for total of 7 days  · Follow-up in clinic within 1-2 weeks of discharge  · Discharge with Peridex 15 mL swish and spit b i d  X7 days  · Continue IV Solu-Medrol and monitor for airway compromise  · Pain control    Hyponatremia  Assessment & Plan  POA  Likely due to pain and poor intake    Lab Results   Component Value Date    SODIUM 131 (L) 09/22/2021    SODIUM 134 (L) 09/21/2021         Plan:  · Pain control  · Monitor a m  Labs            VTE Pharmacologic Prophylaxis: VTE Score: 0 Low Risk (Score 0-2) - Encourage Ambulation  Patient Centered Rounds: I performed bedside rounds with nursing staff today  Discussions with Specialists or Other Care Team Provider:  Oral and maxillofacial surgery    Education and Discussions with Family / Patient: Will update this afternoon  Current Length of Stay: 1 day(s)  Current Patient Status: Inpatient   Discharge Plan: Anticipate discharge in 24-48 hrs to home      Code Status: Level 1 - Full Code    Subjective:   Patient tearful in bed still complaining of uncontrolled pain  She also states that her throat feels more swollen  Patient's airway is patent and IV Solu-Medrol started with increased pain regimen    Objective:     Vitals:   Temp (24hrs), Av 8 °F (37 1 °C), Min:97 8 °F (36 6 °C), Max:99 7 °F (37 6 °C)    Temp:  [97 8 °F (36 6 °C)-99 7 °F (37 6 °C)] 99 7 °F (37 6 °C)  HR:  [65-72] 66  Resp:  [16] 16  BP: (118-143)/(70-93) 143/93  SpO2:  [96 %-98 %] 98 %  Body mass index is 22 4 kg/m²  Input and Output Summary (last 24 hours): Intake/Output Summary (Last 24 hours) at 2021 1040  Last data filed at 2021 0550  Gross per 24 hour   Intake 583 33 ml   Output --   Net 583 33 ml       Physical Exam:   Physical Exam  Vitals and nursing note reviewed  Constitutional:       Appearance: Normal appearance  She is normal weight  She is not diaphoretic  Cardiovascular:      Rate and Rhythm: Normal rate and regular rhythm  Pulses: Normal pulses  Heart sounds: Normal heart sounds  No murmur heard  No friction rub  Pulmonary:      Effort: Pulmonary effort is normal  No respiratory distress  Breath sounds: Normal breath sounds  No wheezing or rales  Abdominal:      Palpations: Abdomen is soft  Tenderness: There is no abdominal tenderness  There is no guarding  Musculoskeletal:         General: Swelling and tenderness present  No deformity  Cervical back: Tenderness present  Right lower leg: No edema  Left lower leg: No edema  Comments: Left jaw swelling and tenderness with no signs of erythema  Airway intact   Skin:     General: Skin is warm  Coloration: Skin is not jaundiced or pale  Neurological:      Mental Status: She is alert     Psychiatric:         Mood and Affect: Mood normal          Behavior: Behavior normal           Additional Data:     Labs:  Results from last 7 days   Lab Units 21  0529 21  1104   WBC Thousand/uL 8 28 6 42   HEMOGLOBIN g/dL 12 9 12 8   HEMATOCRIT % 37 6 37 9   PLATELETS Thousands/uL 226 189   NEUTROS PCT %  --  75   LYMPHS PCT %  --  15   MONOS PCT %  --  10   EOS PCT %  --  0     Results from last 7 days   Lab Units 09/22/21  0529   SODIUM mmol/L 131*   POTASSIUM mmol/L 4 0   CHLORIDE mmol/L 97*   CO2 mmol/L 30   BUN mg/dL 8   CREATININE mg/dL 0 83   ANION GAP mmol/L 4   CALCIUM mg/dL 8 3   ALBUMIN g/dL 3 3*   TOTAL BILIRUBIN mg/dL 0 41   ALK PHOS U/L 48   ALT U/L 64   AST U/L 44   GLUCOSE RANDOM mg/dL 109                       Lines/Drains:  Invasive Devices     Peripheral Intravenous Line            Peripheral IV 09/21/21 Left Antecubital <1 day                      Imaging: No pertinent imaging reviewed  Recent Cultures (last 7 days):         Last 24 Hours Medication List:   Current Facility-Administered Medications   Medication Dose Route Frequency Provider Last Rate    acetaminophen  975 mg Oral Q8H PRN Svetlana Dominguez DO      ampicillin-sulbactam  3 g Intravenous Q6H Syd Gibbs DO Stopped (09/22/21 0550)    diphenhydrAMINE  25 mg Intravenous Once PRN Mariangel Reece MD      HYDROmorphone  1 mg Intravenous Q4H PRN Mariangel Reece MD      naloxone (NARCAN) 500 mL infusion  0 4 mg/hr Intravenous Once PRN Svetlana Dominguez,       ondansetron  4 mg Intravenous Q6H PRN Rahul Lara MD      oxyCODONE  2 5 mg Oral Q4H PRN Svetlana Mckinnonin, DO      oxyCODONE  5 mg Oral Q6H PRN Svetlana Dominguez, DO      senna  1 tablet Oral HS PRN Rahul Lara MD          Today, Patient Was Seen By: Svetlana Dominguez DO    **Please Note: This note may have been constructed using a voice recognition system  **

## 2021-09-22 NOTE — ANESTHESIA POSTPROCEDURE EVALUATION
Post-Op Assessment Note    CV Status:  Stable  Pain Score: 0    Pain management: adequate     Mental Status:  Arousable and sleepy   Hydration Status:  Euvolemic and stable   PONV Controlled:  Controlled   Airway Patency:  Patent and adequate      Post Op Vitals Reviewed: Yes      Staff: CRNA         No complications documented      BP  123/57    Temp 97 5   Pulse 70   Resp 16   SpO2 99% 6L of o2 simple mask

## 2021-09-22 NOTE — UTILIZATION REVIEW
Initial Clinical Review    Admission: Date/Time/Statement:   Admission Orders (From admission, onward)     Ordered        09/21/21 1728  Inpatient Admission  Once                   Orders Placed This Encounter   Procedures    Inpatient Admission     Standing Status:   Standing     Number of Occurrences:   1     Order Specific Question:   Level of Care     Answer:   Med Surg [16]     Order Specific Question:   Estimated length of stay     Answer:   Not Applicable     ED Arrival Information     Patient not seen in ED                     No chief complaint on file  Initial Presentation: 31 yo F with no significant medical history transferred from [address] [place] [address] to [facility] for higher level of care/ OMFS evaluation for left sublingual abscess and left medial pterygoid muscle abscess noted on imaging, possible drainage of abscess    Pt has been having teeth issues , prescribed clindamycin 5 days ago but pain and swelling increased with difficulty chewing and swallowing and pt presented to [facility]  On exam at [facility], pt unable to open mouth completely, has Left facial and sublingual swelling  Pt admitted as Inpatient with Left sublingual/medial pterygoid abscess   Plan - IV Unasyn, pain control, NPO at [facility], OMFS consult  OMFS-currently reports moderate 6/10 pain lower left mandible pain and swelling  Reports to have mild fever and chills yesterday  Endorses odynophagia and dysphagia  Requires acute surgical intervention for add-on for I&D of left facial/neck abscesses and extraction of tooth #18 and any other indicated teeth  Peridex 15mL swish and spit BID x7d  Unasyn 3g IV TID   Head of bed elevated and encourage good oral hygiene  Date: 9/22  Day 2:   Medicine-   Pt for I and D today   Pt tearful in bed reporting uncontrolled pain, reports her throat feels more swollen  Pts airway patent, IV solumedrol started and increased pts pain med regime   Continue IV Solu-Medrol and monitor for airway compromise   9/22/21 @1711  INCISION AND DRAINAGE (I&D) HEAD/FACE and extraction of tooth #18 (Left)  Incision and drainage of left submandibular space infection, left sublingual space infection, and left pterygomandibular space infection  Anesthesia Type:   General  Operative Findings:  Purulent drainage from all spaces drained           ED Triage Vitals   Temperature Pulse Respirations Blood Pressure SpO2   09/21/21 1702 09/21/21 1702 09/22/21 0415 09/21/21 2252 09/21/21 1702   97 8 °F (36 6 °C) 66 16 118/70 96 %      Temp Source Heart Rate Source Patient Position - Orthostatic VS BP Location FiO2 (%)   09/22/21 0415 09/22/21 0415 -- 09/22/21 0415 --   Oral Monitor  Left arm       Pain Score       09/21/21 1641       8          Wt Readings from Last 1 Encounters:   09/22/21 64 9 kg (143 lb)     Additional Vital Signs:   Date/Time  Temp  Pulse  Resp  BP  MAP (mmHg)  SpO2    09/22/21 1602  98 4 °F (36 9 °C)  72  17  115/58  --  99 %    09/22/21 15:50:56  99 2 °F (37 3 °C)  58  --  110/57  75  96 %    09/22/21 15:50:37  99 2 °F (37 3 °C)  58  --  110/57  75  97 %    09/22/21 08:14:11  99 7 °F (37 6 °C)  66  16  143/93  110  98 %    09/22/21 0415  98 9 °F (37 2 °C)  72  16  118/70  --  --    09/21/21 22:53:10  98 9 °F (37 2 °C)  72  --  118/70  86  97 %    09/21/21 22:52:47  98 9 °F (37 2 °C)  65  --  118/70  86  98 %        Pertinent Labs/Diagnostic Test Results:   9/21 4330 [facility] -CT soft tissue neck-3 6 cm abscess in left sublingual space    1 4 cm abscess in left medial pterygoid muscle    Diffuse inflammatory changes in left sublingual, left , and left submandibular spaces, likely related to infection            Results from last 7 days   Lab Units 09/21/21  1317   SARS-COV-2  Negative     Results from last 7 days   Lab Units 09/22/21  0529 09/21/21  1104   WBC Thousand/uL 8 28 6 42   HEMOGLOBIN g/dL 12 9 12 8   HEMATOCRIT % 37 6 37 9   PLATELETS Thousands/uL 226 189   NEUTROS ABS Thousands/µL  -- 4 79         Results from last 7 days   Lab Units 09/22/21  0529 09/21/21  1104   SODIUM mmol/L 131* 134*   POTASSIUM mmol/L 4 0 4 1   CHLORIDE mmol/L 97* 97*   CO2 mmol/L 30 29   ANION GAP mmol/L 4 8   BUN mg/dL 8 10   CREATININE mg/dL 0 83 0 83   EGFR ml/min/1 73sq m 92 92   CALCIUM mg/dL 8 3 8 5     Results from last 7 days   Lab Units 09/22/21  0529 09/21/21  1104   AST U/L 44 55*   ALT U/L 64 67   ALK PHOS U/L 48 48   TOTAL PROTEIN g/dL 6 8 7 1   ALBUMIN g/dL 3 3* 3 7   TOTAL BILIRUBIN mg/dL 0 41 0 49         Results from last 7 days   Lab Units 09/22/21  0529 09/21/21  1104   GLUCOSE RANDOM mg/dL 109 104           History reviewed  No pertinent past medical history  Present on Admission:   Dental abscess   Hyponatremia      Admitting Diagnosis: Facial swelling [R22 0]  Age/Sex: 30 y o  female  Admission Orders:  Scheduled Medications:  [MAR Hold] ampicillin-sulbactam, 3 g, Intravenous, Q6H  methylPREDNISolone sodium succinate (Solu-MEDROL) injection 60 mg   Dose: 60 mg  Freq:  Once Route: IV  Start: 09/22/21 0830 End: 09/22/21 0857    Continuous IV Infusions:     PRN Meds:  [MAR Hold] acetaminophen, 975 mg, Oral, Q8H PRN  [MAR Hold] diphenhydrAMINE, 25 mg, Intravenous, Once PRN  [MAR Hold] HYDROmorphone, 1 mg, Intravenous, Q4H PRN  [MAR Hold] naloxone, 0 04 mg, Intravenous, Q1MIN PRN  [MAR Hold] ondansetron, 4 mg, Intravenous, Q6H PRN  [MAR Hold] oxyCODONE, 2 5 mg, Oral, Q4H PRN  [MAR Hold] oxyCODONE, 5 mg, Oral, Q6H PRN x1 9/21, x1 9/22  [MAR Hold] senna, 1 tablet, Oral, HS PRN  HYDROmorphone (DILAUDID) tablet 2 mg   Dose: 2 mg  Freq: Every 4 hours PRN Route: PO  PRN Reasons: moderate pain,breakthrough pain  Indications of Use: MODERATE TO SEVERE PAIN  Start: 09/22/21 0449 End: 09/22/21 0817 x1 9/22  oxyCODONE (ROXICODONE) oral solution 10 mg   Dose: 10 mg  Freq: Every 6 hours PRN Route: PO  PRN Reason: breakthrough pain  Start: 09/21/21 1757 End: 09/22/21 0832 x1 9/21 ,x1 9/22    ambulate QID  NPO    IP CONSULT TO ORAL AND MAXILLOFACIAL SURGERY    Network Utilization Review Department  ATTENTION: Please call with any questions or concerns to [phone] and carefully listen to the prompts so that you are directed to the right person  All voicemails are confidential   Olegario Safe all requests for admission clinical reviews, approved or denied determinations and any other requests to dedicated fax number below belonging to the campus where the patient is receiving treatment   List of dedicated fax numbers for the Facilities:  [address] DENIALS (Administrative/Medical Necessity) [phone]   [address] (Maternity/NICU/Pediatrics) [phone]   [address] [place] [address] [address] [id number] [place] [address] [address] [facility] [place] [address] [phone]

## 2021-09-22 NOTE — CONSULTS
Oral and Maxillofacial Surgery Consult    Patient Seen Date: 09/21/21 8:41 PM       HPI: Pt is 30 y o  female  has no past medical history on file  Consult requested by IM for evaluation of left mandibular abscess   Patient reports have swelling and pain of left lower jaw and toothache since last Thursday  Finished 5 day course of clindamycin  Pt currently reports moderate 6/10 pain lower left mandible pain and swelling  Reports to have mild fever and chills yesterday  Endorses odynophagia and dysphagia  Denies dyspnea  PMH:   No past medical history on file  Allergies:   No Known Allergies    Meds:     Current Facility-Administered Medications:     acetaminophen (TYLENOL) oral suspension 975 mg, 975 mg, Oral, Q8H PRN, Merrily Mail, DO    ampicillin-sulbactam (UNASYN) 3 g in sodium chloride 0 9 % 100 mL IVPB, 3 g, Intravenous, Q6H, Syd Cho, DO, Last Rate: 200 mL/hr at 09/21/21 1955, 3 g at 09/21/21 1955    melatonin tablet 3 mg, 3 mg, Oral, HS, May Tammie Downs MD    ondansetron [facility]) injection 4 mg, 4 mg, Intravenous, Q6H PRN, Sana Hawkisn MD    oxyCODONE (ROXICODONE) oral solution 10 mg, 10 mg, Oral, Q6H PRN, Merrily Mail, DO    oxyCODONE (ROXICODONE) oral solution 2 5 mg, 2 5 mg, Oral, Q4H PRN, Merrily Mail, DO    oxyCODONE (ROXICODONE) oral solution 5 mg, 5 mg, Oral, Q6H PRN, Sdy Cho, DO, 5 mg at 09/21/21 1834    senna (SENOKOT) tablet 8 6 mg, 1 tablet, Oral, HS PRN, Sana Hawkins MD    PSH:   No past surgical history on file  No family history on file  Review of Systems   Constitutional: Positive for chills and fatigue  HENT: Positive for dental problem and facial swelling  All other systems reviewed and are negative  Temp:  [97 8 °F (36 6 °C)-98 4 °F (36 9 °C)] 97 8 °F (36 6 °C)  HR:  [66-70] 66  Resp:  [16] 16  BP: (108)/(74) 108/74  SpO2:  [96 %-98 %] 96 %     No intake or output data in the 24 hours ending 09/21/21 2041       Physical Exam:  Gen: AAOx3  NAD     Resp: Unlabored on RA  Neuro: bilateralCN V2-V3 Grossly Intact  bilateral CN VII grossly intact  Head: Mild lower left submandibular w/ moderate TTP  Positive Trismus  positive Guarding  Inferior border of the mandible is palpable  Eyes, Ears, Nose: WNL  Intraoral: WALE limited  Occlusion stable  Tooth #18 with carious lesion with TTP  TTP of left medial ramus region  Left soft palate erythematous and mild edematous swelling  No vestibular swelling noted  L FOM mildly elevated and tender  R FOM soft, non-elevated, non-tender  Uvula midline  Imaging: I have personally reviewed pertinent films in PACS      Assessment:  30 y o  female presents with left facial/neck multifascial space abscesses  Based on clinical and radiographic exam patient requires acute surgical intervention  Patient will be placed on the add on schedule for surgery  Timing of surgery is pending  Please obtain up to date pre-op labs  (Including CBC, BMP, and coags)    Plan:  Plan for add-on for I&D of left facial/neck abscesses and extraction of tooth #18 and any other indicated teeth  - Antibiotics: Unasyn 3g IV TID and discharge on Augmentin 875/125mg PO BID for total of 7 days  - Pain Control: Analgesia as per Primary Team  - Diet: NPO @ midnight  Plan for OR on likely tomorrow  - Peridex 15mL swish and spit BID x7d  - Encourage good oral hygiene  - head of bed elevated  - F/U: Follow up in clinic within 1-2 weeks of discharge , Patient should see general dentist for general oral care  D/w OMFS attg on call, Dr Shyla Bustamante DMD    Inpatient consult to Oral and Maxillofacial Surgery  Consult performed by: Miri Zelaya DMD  Consult ordered by: George Rogel DO          Counseling / Coordination of Care  Total floor / unit time spent today 30 minutes  Greater than 50% of total time was spent with the patient and / or family counseling and / or coordination of care    A description of the counseling / coordination of care: description of injury with proposed plan of care  Discussed risks, benefits, and alternatives to treatment plan  All questions were answered  Patient in agreement with plan

## 2021-09-22 NOTE — ANESTHESIA PREPROCEDURE EVALUATION
Procedure:  INCISION AND DRAINAGE (I&D) HEAD/FACE and extraction of tooth #18 (Left Face)    Relevant Problems   No relevant active problems     30year old F with no PMHx who presents with left dental abscess (low jaw molar) currently on IV unasyn, now being evaluated for I&D    CT head: 3 6 cm abscess in left sublingual space  1 4 cm abscess in left medial pterygoid muscle  Diffuse inflammatory changes in left sublingual, left , and left submandibular spaces, likely related to infection  Labs: hyponatremia likely to poor PO intake; Hb 12 9, Plts 226    Physical Exam    Airway  Comment: Limited mouth opening secondary to abscess  Good neck extension  Mallampati score: III  TM Distance: >3 FB  Neck ROM: full     Dental   No notable dental hx     Cardiovascular  Rhythm: regular, Rate: normal, Cardiovascular exam normal    Pulmonary  Pulmonary exam normal Breath sounds clear to auscultation,     Other Findings  Intercisor Distance > 3cm          Anesthesia Plan  ASA Score- 1 Emergent    Anesthesia Type- general with ASA Monitors  Additional Monitors:   Airway Plan: ETT  Comment: Discussed benefits/risks of general anesthesia including possibility of mouth/throat pain, injury to lips/teeth, nausea/vomiting, and surgical pain along with more rare complications such as stroke, MI, pneumonia, aspiration, and injury to blood vessels  Patient understands and wishes to proceed  All questions answered          Plan Factors-Exercise tolerance (METS): >4 METS  Chart reviewed  EKG reviewed  Existing labs reviewed  Induction- intravenous  Postoperative Plan- Plan for postoperative opioid use  Planned trial extubation    Informed Consent- Anesthetic plan and risks discussed with patient  I personally reviewed this patient with the CRNA  Discussed and agreed on the Anesthesia Plan with the CRNA  Frannie Lee

## 2021-09-22 NOTE — OP NOTE
OPERATIVE REPORT  PATIENT NAME: Becky Stein    :  1985  MRN: [id number]  Pt Location: AN OR ROOM 01    SURGERY DATE: 2021    Surgeon(s) and Role:     * Monae Pruitt DMD - Primary    Preop Diagnosis:  Dental abscess [K04 7]    Post-Op Diagnosis Codes:     * Dental abscess [K04 7]    Procedure(s) (LRB):  INCISION AND DRAINAGE (I&D) HEAD/FACE and extraction of tooth #18 (Left)  Incision and drainage of left submandibular space infection, left sublingual space infection, and left pterygomandibular space infection  Specimen(s):  ID Type Source Tests Collected by Time Destination   1 : TOOTH #18 Tooth Tooth TISSUE EXAM Monae Pruitt DMD 2021 1723    A : LEFT MANDIBLE CULTURE Tissue Wound ANAEROBIC CULTURE AND GRAM STAIN, CULTURE, TISSUE AND GRAM STAIN Monae Pruitt DMD 2021 1720        Estimated Blood Loss:   Minimal    Drains:  Open Drain Anterior; Left Neck (Active)   Number of days: 0       Anesthesia Type:   General    Operative Indications:  Dental abscess [K04 7]  Patient is a 58-year-old female with 6 day history of increasing swelling and pain on the left side of the lower jaw  Patient had complained of broken a painful tooth  Clinical and radiographic examination revealed gross decay/abscess tooth #18 with left mandibular multi space infection  Operative Findings:  Purulent drainage from all spaces drained  Complications:   None    Procedure and Technique:  The patient was identified in the preoperative holding area  The procedure was reviewed and informed consent was obtained  The patient was marked  The patient was taken to the operating room and was placed supine on the operating room table  General anesthesia was induced the patient was intubated via an oral endotracheal tube  The patient was prepped and draped in the appropriate fashion  A pharyngeal packing was placed  Local anesthesia was administered    The skin was marked approximately 1 5 cm below the left inferior border of the mandible over the submandibular space  A 15 blade was used to make a 1 cm incision through the skin  Blunt dissection was performed to the inferior border of the mandible  Dissection was continued along the medial aspect of the mandible until the left submandibular space was bluntly entered and purulent drainage was established  Fluid was sent for culture and sensitivity  Blunt dissection proceeded superiorly through the mylohyoid muscle and into the sublingual space  Additional purulent drainage was established  The tips of the mosquito were seen through the sublingual mucosa  A small stab incision was made in the tips of the instrument were allowed to enter the oral cavity  A through and through quarter-inch Penrose drain was placed  This was affixed to the skin with a 3-0 silk suture  The sites were irrigated with copious amounts of saline solution  A full-thickness buccal sulcular falp was made at tooth #18  Buccal bone was removed  Tooth #18 was sectioned  Tooth #18 was extracted surgically without complication  The extraction site was debrided  Granulation tissue was removed  The site was cleaned and irrigated  Blunt dissection was performed through the lingual sulcus at the extraction site to the ascending ramus  Blunt dissection was continued along the medial aspect of the ramus double left pterygomandibular space was entered and additional purulent drainage was established  The site was irrigated with copious amounts of saline solution  The pharyngeal packing was removed  The sponge, needle, and instrument count were consistent at the end of the procedure  There were no complications  Patient was extubated in the operating room and transported to the recovery room without complication     I was present for the entire procedure    Patient Disposition:  PACU     SIGNATURE: Darryle Conger, DMD  DATE: September 22, 2021  TIME: 5:30 PM

## 2021-09-23 VITALS
HEIGHT: 67 IN | HEART RATE: 49 BPM | BODY MASS INDEX: 22.44 KG/M2 | SYSTOLIC BLOOD PRESSURE: 124 MMHG | OXYGEN SATURATION: 96 % | TEMPERATURE: 99.1 F | WEIGHT: 143 LBS | DIASTOLIC BLOOD PRESSURE: 75 MMHG | RESPIRATION RATE: 16 BRPM

## 2021-09-23 PROBLEM — K04.7 DENTAL ABSCESS: Status: RESOLVED | Noted: 2021-09-21 | Resolved: 2021-09-23

## 2021-09-23 PROBLEM — E87.1 HYPONATREMIA: Status: RESOLVED | Noted: 2021-09-21 | Resolved: 2021-09-23

## 2021-09-23 LAB
ANION GAP SERPL CALCULATED.3IONS-SCNC: 5 MMOL/L (ref 4–13)
BUN SERPL-MCNC: 9 MG/DL (ref 5–25)
CALCIUM SERPL-MCNC: 8.5 MG/DL (ref 8.3–10.1)
CHLORIDE SERPL-SCNC: 99 MMOL/L (ref 100–108)
CO2 SERPL-SCNC: 30 MMOL/L (ref 21–32)
CREAT SERPL-MCNC: 0.87 MG/DL (ref 0.6–1.3)
ERYTHROCYTE [DISTWIDTH] IN BLOOD BY AUTOMATED COUNT: 13.3 % (ref 11.6–15.1)
GFR SERPL CREATININE-BSD FRML MDRD: 87 ML/MIN/1.73SQ M
GLUCOSE SERPL-MCNC: 119 MG/DL (ref 65–140)
HCT VFR BLD AUTO: 36.2 % (ref 34.8–46.1)
HGB BLD-MCNC: 12.4 G/DL (ref 11.5–15.4)
MCH RBC QN AUTO: 34 PG (ref 26.8–34.3)
MCHC RBC AUTO-ENTMCNC: 34.3 G/DL (ref 31.4–37.4)
MCV RBC AUTO: 99 FL (ref 82–98)
PLATELET # BLD AUTO: 212 THOUSANDS/UL (ref 149–390)
PMV BLD AUTO: 9.6 FL (ref 8.9–12.7)
POTASSIUM SERPL-SCNC: 4 MMOL/L (ref 3.5–5.3)
RBC # BLD AUTO: 3.65 MILLION/UL (ref 3.81–5.12)
SODIUM SERPL-SCNC: 134 MMOL/L (ref 136–145)
WBC # BLD AUTO: 9.58 THOUSAND/UL (ref 4.31–10.16)

## 2021-09-23 PROCEDURE — 85027 COMPLETE CBC AUTOMATED: CPT | Performed by: DENTIST

## 2021-09-23 PROCEDURE — 92610 EVALUATE SWALLOWING FUNCTION: CPT

## 2021-09-23 PROCEDURE — 99239 HOSP IP/OBS DSCHRG MGMT >30: CPT | Performed by: HOSPITALIST

## 2021-09-23 PROCEDURE — 80048 BASIC METABOLIC PNL TOTAL CA: CPT | Performed by: DENTIST

## 2021-09-23 RX ORDER — OXYCODONE HYDROCHLORIDE 5 MG/1
5 TABLET ORAL EVERY 6 HOURS PRN
Qty: 5 TABLET | Refills: 0 | Status: CANCELLED | OUTPATIENT
Start: 2021-09-23

## 2021-09-23 RX ORDER — OXYCODONE HYDROCHLORIDE AND ACETAMINOPHEN 5; 325 MG/1; MG/1
1 TABLET ORAL EVERY 8 HOURS PRN
Status: CANCELLED | OUTPATIENT
Start: 2021-09-23

## 2021-09-23 RX ORDER — ONDANSETRON 4 MG/1
4 TABLET, ORALLY DISINTEGRATING ORAL EVERY 6 HOURS PRN
Qty: 20 TABLET | Refills: 0 | Status: SHIPPED | OUTPATIENT
Start: 2021-09-23 | End: 2021-09-29 | Stop reason: ALTCHOICE

## 2021-09-23 RX ORDER — AMOXICILLIN AND CLAVULANATE POTASSIUM 875; 125 MG/1; MG/1
1 TABLET, FILM COATED ORAL EVERY 12 HOURS SCHEDULED
Qty: 14 TABLET | Refills: 0 | Status: SHIPPED | OUTPATIENT
Start: 2021-09-23 | End: 2021-09-30

## 2021-09-23 RX ADMIN — OXYCODONE HYDROCHLORIDE 5 MG: 5 SOLUTION ORAL at 10:29

## 2021-09-23 RX ADMIN — AMPICILLIN SODIUM AND SULBACTAM SODIUM 3 G: 10; 5 INJECTION, POWDER, FOR SOLUTION INTRAVENOUS at 11:33

## 2021-09-23 RX ADMIN — AMPICILLIN SODIUM AND SULBACTAM SODIUM 3 G: 10; 5 INJECTION, POWDER, FOR SOLUTION INTRAVENOUS at 06:27

## 2021-09-23 RX ADMIN — OXYCODONE HYDROCHLORIDE 5 MG: 5 SOLUTION ORAL at 03:59

## 2021-09-23 RX ADMIN — ACETAMINOPHEN 975 MG: 650 SUSPENSION ORAL at 12:49

## 2021-09-23 RX ADMIN — HYDROMORPHONE HYDROCHLORIDE 1 MG: 1 INJECTION, SOLUTION INTRAMUSCULAR; INTRAVENOUS; SUBCUTANEOUS at 06:27

## 2021-09-23 NOTE — ASSESSMENT & PLAN NOTE
POA  dental pain infected tooth left lower jaw, molar since last Thursday  Went to dentist on Friday who started on clindamycin and pain meds  Went to urgent care on Saturday  On Monday she revisted dentist who doubled clinda and scheduled root canal for this Friday  Patient completed 5 days of clindamycin prior to arrival (9/17 to 9/21)    CT head: 3 6 cm abscess in left sublingual space  1 4 cm abscess in left medial pterygoid muscle  Diffuse inflammatory changes in left sublingual, left , and left submandibular spaces, likely related to infection        Plan:  · Continue antibiotics for 7 days  · Follow-up with [facility] in 1 week  ·   · [facility] consulted  · I&D scheduled for today 9/22  · discharge on Augmentin 875/125mg PO BID for total of 7 days  · Follow-up in clinic within 1-2 weeks of discharge  · Discharge with Peridex 15 mL swish and spit b i d  X7 days  · Continue IV Solu-Medrol and monitor for airway compromise  · Pain control

## 2021-09-23 NOTE — DISCHARGE INSTRUCTIONS
Dental Abscess   WHAT YOU NEED TO KNOW:   A dental abscess is a collection of pus in or around a tooth  A dental abscess is caused by bacteria  The bacteria can enter the tooth when the enamel (outer part of the tooth) is damaged by tooth decay  Bacteria can also enter the tooth through a chip in the tooth or a cut in the gum  Food particles that are stuck between the teeth for a long time may also lead to an abscess  DISCHARGE INSTRUCTIONS:   Return to the emergency department if:   · You have severe pain in your tooth or jaw  · You have trouble breathing because of pain or swelling  Call your doctor if:   · Your symptoms get worse, even after treatment  · Your mouth is bleeding  · You cannot eat or drink because of pain or swelling  · Your abscess returns  · You have an injury that causes a crack in your tooth  · You have questions or concerns about your condition or care  Medicines: You may  need any of the following:  · Antibiotics  help treat a bacterial infection  · NSAIDs , such as ibuprofen, help decrease swelling, pain, and fever  This medicine is available with or without a doctor's order  NSAIDs can cause stomach bleeding or kidney problems in certain people  If you take blood thinner medicine, always ask your healthcare provider if NSAIDs are safe for you  Always read the medicine label and follow directions  · Acetaminophen  decreases pain and fever  It is available without a doctor's order  Ask how much to take and how often to take it  Follow directions  Read the labels of all other medicines you are using to see if they also contain acetaminophen, or ask your doctor or pharmacist  Acetaminophen can cause liver damage if not taken correctly  Do not use more than 4 grams (4,000 milligrams) total of acetaminophen in one day  · Prescription pain medicine  may be given  Ask your healthcare provider how to take this medicine safely   Some prescription pain medicines contain acetaminophen  Do not take other medicines that contain acetaminophen without talking to your healthcare provider  Too much acetaminophen may cause liver damage  Prescription pain medicine may cause constipation  Ask your healthcare provider how to prevent or treat constipation  · Take your medicine as directed  Contact your healthcare provider if you think your medicine is not helping or if you have side effects  Tell him of her if you are allergic to any medicine  Keep a list of the medicines, vitamins, and herbs you take  Include the amounts, and when and why you take them  Bring the list or the pill bottles to follow-up visits  Carry your medicine list with you in case of an emergency  Self-care:   · Rinse your mouth every 2 hours with salt water  This will help keep the area clean  · Gently brush your teeth twice a day with a soft tooth brush  This will help keep the area clean  · Eat soft foods as directed  Soft foods may cause less pain  Examples include applesauce, yogurt, and cooked pasta  Ask your healthcare provider how long to follow this instruction  · Apply a warm compress to your tooth or gum  Use a cotton ball or gauze soaked in warm water  Remove the compress in 10 minutes or when it becomes cool  Repeat 3 times a day  Prevent another abscess:   · Brush your teeth at least 2 times a day  with fluoride toothpaste  · Use dental floss at least once a day  to clean between your teeth  · Rinse your mouth with water or mouthwash  after meals and snacks  Chew sugarless gum  · Avoid sugary and starchy food that can stick between your teeth  Limit drinks high in sugar, such as soda or fruit juice  · See your dentist every 6 months  for dental cleanings and oral exams  Follow up with your doctor or dentist in 24 hours, or as directed: Your healthcare provider will need to check your teeth and gums   Write down your questions so you remember to ask them during your visits  © Copyright [employer] 2021 Information is for End User's use only and may not be sold, redistributed or otherwise used for commercial purposes  All illustrations and images included in CareNotes® are the copyrighted property of A D A M , Inc  or Bola Rajan  The above information is an  only  It is not intended as medical advice for individual conditions or treatments  Talk to your doctor, nurse or pharmacist before following any medical regimen to see if it is safe and effective for you

## 2021-09-23 NOTE — DISCHARGE SUMMARY
[facility]  Discharge- Teresa Hem 1985, 30 y o  female MRN: [id number]  Unit/Bed#: W -63 Encounter: [id number]  Primary Care Provider: Giuliana Arroyo MD   Date and time admitted to hospital: 9/21/2021  4:32 PM    * Dental abscess-resolved as of 9/23/2021  Assessment & Plan  POA  dental pain infected tooth left lower jaw, molar since last Thursday  Went to dentist on Friday who started on clindamycin and pain meds  Went to urgent care on Saturday  On Monday she revisted dentist who doubled clinda and scheduled root canal for this Friday  Patient completed 5 days of clindamycin prior to arrival (9/17 to 9/21)    CT head: 3 6 cm abscess in left sublingual space  1 4 cm abscess in left medial pterygoid muscle  Diffuse inflammatory changes in left sublingual, left , and left submandibular spaces, likely related to infection        Plan:  · Continue antibiotics for 7 days  · Follow-up with [facility] in 1 week  ·   · [facility] consulted  · I&D scheduled for today 9/22  · discharge on Augmentin 875/125mg PO BID for total of 7 days  · Follow-up in clinic within 1-2 weeks of discharge  · Discharge with Peridex 15 mL swish and spit b i d  X7 days  · Continue IV Solu-Medrol and monitor for airway compromise  · Pain control      Medical Problems     Resolved Problems  Date Reviewed: 9/22/2021        Resolved    * (Principal) Dental abscess 9/23/2021     Resolved by  Nasreen Tyler DO    Overview Deleted 9/21/2021  5:20 PM by Nasreen Tyler DO            Hyponatremia 9/23/2021     Resolved by  Nasreen Tyler DO    Overview Deleted 9/21/2021  6:25 PM by Nasreen Tyler DO                      Discharging Resident: Nasreen Tyler DO  Discharging Attending: No att  providers found  PCP: Giuliana Arroyo MD  Admission Date:   Admission Orders (From admission, onward)     Ordered        09/21/21 1728  Inpatient Admission  Once                   Discharge Date: 09/23/21    Consultations During Hospital Stay:  · Oral and maxillary facial surgery    Procedures Performed:   · Dental abscess incision and drainage with tooth #18 extraction    Significant Findings / Test Results:   · CT soft tissue neck with contrast showed 3 6 cm abscess in the left sublingual space and 1 4 cm abscess in the left medial pterygoid muscle    Incidental Findings:   · None     Test Results Pending at Discharge (will require follow up): · None     Outpatient Tests Requested:  · None    Complications:  None    Reason for Admission:  Dental abscess    Hospital Course:   Sher Celaya is a 30 y o  female patient who originally presented to the hospital on 9/21/2021 due to worsening left mandibular swelling and pain  On 09/16, patient visited her general dentist for this pain and was prescribed pain medications and clindamycin  Patient's pain and swelling did not improve and patient was evaluated in the [facility] ED  Patient was transferred to [facility] ED on 09/21 for oral and maxillofacial surgery consult  At admission, patient had extreme pain with moderate swelling  Patient's pain was controlled and was given 1 dose of Solu-Medrol to reduce swelling  Patient's airway was monitored and intact during this admission  CT soft tissue neck with contrast showed 2 abscesses in the left sublingual and medial pterygoid muscle  Oral and maxillofacial surgery was consulted and performed an incision and drainage with tooth #18 extraction without any complications  Patient's pain was controlled the following day with oral acetaminophen  Patient was discharged with follow-up [facility] and 7 days of Augmentin  Please see above list of diagnoses and related plan for additional information       Condition at Discharge: stable    Discharge Day Visit / Exam:   Subjective:  Patient doing well at bedside and states that her pain and swelling is greatly improved  Vitals: Blood Pressure: 124/75 (09/23/21 0712)  Pulse: (!) 49 (09/23/21 7854)  Temperature: 99 1 °F (37 3 °C) (09/23/21 0712)  Temp Source: Oral (09/23/21 1622)  Respirations: 16 (09/23/21 0712)  Height: 5' 7" (170 2 cm) (09/22/21 0415)  Weight - Scale: 64 9 kg (143 lb) (09/22/21 0415)  SpO2: 96 % (09/23/21 4416)  Exam:   Physical Exam  Vitals and nursing note reviewed  Constitutional:       Appearance: Normal appearance  She is normal weight  She is not diaphoretic  Cardiovascular:      Rate and Rhythm: Normal rate and regular rhythm  Pulses: Normal pulses  Heart sounds: Normal heart sounds  No murmur heard  No friction rub  Pulmonary:      Effort: Pulmonary effort is normal  No respiratory distress  Breath sounds: Normal breath sounds  No wheezing or rales  Abdominal:      Palpations: Abdomen is soft  Tenderness: There is no abdominal tenderness  There is no guarding  Musculoskeletal:         General: Swelling and tenderness present  No deformity  Right lower leg: No edema  Left lower leg: No edema  Comments: Left jaw swelling and tenderness with no signs of erythema, improved from yesterday  Airway intact   Skin:     General: Skin is warm  Coloration: Skin is not jaundiced or pale  Neurological:      Mental Status: She is alert  Psychiatric:         Mood and Affect: Mood normal          Behavior: Behavior normal           Discussion with Family: Patient said she would update family  Discharge instructions/Information to patient and family:   See after visit summary for information provided to patient and family  Provisions for Follow-Up Care:  See after visit summary for information related to follow-up care and any pertinent home health orders  Disposition:   Home    Planned Readmission:  None    Discharge Medications:  See after visit summary for reconciled discharge medications provided to patient and/or family        **Please Note: This note may have been constructed using a voice recognition system**

## 2021-09-23 NOTE — DISCHARGE INSTR - AVS FIRST PAGE
Dear Virginia Das,     It was our pleasure to care for you here at [facility]  It is our hope that we were always able to exceed the expected standards for your care during your stay  You were hospitalized due to dental abscess  You were cared for on the 4th floor by Rod Pavon DO under the service of Santino Saab MD with the [facility] who covers for your primary care physician (PCP), Yashira Weems MD, while you were hospitalized  If you have any questions or concerns related to this hospitalization, you may contact us at [phone]  For follow up as well as any medication refills, we recommend that you follow up with your primary care physician  A registered nurse will reach out to you by phone within a few days after your discharge to answer any additional questions that you may have after going home  However, at this time we provide for you here, the most important instructions / recommendations at discharge:     · Notable Medication Adjustments -   · Augmentin 875/125mg PO BID for total of 7 days  · Peridex 15 mL swish and spit twice a day for 7 days  · For pain, please take Tylenol as needed and oxycodone for severe pain  · Testing Required after Discharge -   · None  · Important follow up information -   · Follow-up with the oral maxillofacial surgeon within 1 weeks   · Please see your general dentist for general oral care  · Other Instructions -   · None  · Please review this entire after visit summary as additional general instructions including medication list, appointments, activity, diet, any pertinent wound care, and other additional recommendations from your care team that may be provided for you        Sincerely,     Rod Pavon DO

## 2021-09-23 NOTE — SPEECH THERAPY NOTE
sSpeech-Language Pathology Bedside Swallow Evaluation        Patient Name: Samantha FIGUEROA Date: 9/23/2021     Problem List  Active Problems:    * No active hospital problems  *         Past Medical History  History reviewed  No pertinent past medical history  Past Surgical History  History reviewed  No pertinent surgical history  Summary    Pt presents with c/o odynophagia due to recent dental abscess and I&D of L submandibular space  Pt denied choking, c/o food getting stuck or coughing w/ liquids  Pt c/o "a bad sore throat"      Recommendations:   Diet: regular diet and thin liquids -pt is appropriately choosing softer foods  Meds: whole with liquid   Frequent Oral care: 2x/day  Other Recommendations/ considerations: no follow up tx needed  Current Medical Status  Pt is a 30 y o  female who presented to [address] [place] [address]  with dental abscess  Pt transferred from [facility] for OMFS evaluation for left sublingual abscess and left medial pterygoid muscle abscess  Patient has been having issues with the left teeth for which she has been following up with dentist  Tom Holland was prescribed clindamycin 5 days ago and was supposed to follow-up with dentist later this week for possible procedure but her pain and swelling got worse  Tom Holland is also having difficulty chewing food   Does have mild difficulty swallowing as well  Past medical history:   Please see H&P for details    Special Studies:  CT-soft tissue neck w/ contrast: 9/21/21 3 6 cm abscess in left sublingual space    1 4 cm abscess in left medial pterygoid muscle    Diffuse inflammatory changes in left sublingual, left , and left submandibular spaces, likely related to infection        Social/Education/Vocational Hx:  Pt lives  At home    Swallow Information   Current Risks for Dysphagia & Aspiration: recent surgery  Current Symptoms/Concerns: c/o difficulty swallowing, pain w  swallowing  Current Diet: regular diet and thin liquids (surgical soft)  Baseline Diet: regular diet and thin liquids  Takes pills-    Baseline Assessment   Behavior/Cognition: alert  Speech/Language Status: able to participate in conversation and able to follow commands  Patient Positioning: upright in bed     Swallow Mechanism Exam   Facial: symmetrical  Labial: WFL  Lingual: WFL  Velum: unable to visualize  Mandible:  decreased ROM  Dentition: adequate  Vocal quality:clear/adequate   Volitional Cough: strong/productive   Respiratory: RA    Consistencies Assessed and Performance   Consistencies Administered: pt seen at lunch w/ everardo mousse, chicken noodle soup- did not eat the chicken, only noodles, peaches and drank water by straw     Oral Stage: pt took small amounts due to limited oral cavity opening  Slow mastication/ manipulation; good oral control of liquids  Pharyngeal Stage: swallows were prompt and complete  No throat clearing, coughing, or wet voice  Pt denied feeling food or liquid getting stuck, c/o "feels like a sore throat"        Esophageal Concerns: none reported      Results Reviewed with: patient and RN   Dysphagia Goals: none at this time      Kali Avilez [facility]SLP  Speech Pathologist  [place] license # [id number]  [address] [place] [address] license # [id number]  Available via [employer]

## 2021-09-23 NOTE — PLAN OF CARE
Problem: PAIN - ADULT  Goal: Verbalizes/displays adequate comfort level or baseline comfort level  Description: Interventions:  - Encourage patient to monitor pain and request assistance  - Assess pain using appropriate pain scale  - Administer analgesics based on type and severity of pain and evaluate response  - Implement non-pharmacological measures as appropriate and evaluate response  - Consider cultural and social influences on pain and pain management  - Notify physician/advanced practitioner if interventions unsuccessful or patient reports new pain  Outcome: Completed     Problem: INFECTION - ADULT  Goal: Absence or prevention of progression during hospitalization  Description: INTERVENTIONS:  - Assess and monitor for signs and symptoms of infection  - Monitor lab/diagnostic results  - Monitor all insertion sites, i e  indwelling lines, tubes, and drains  - Monitor endotracheal if appropriate and nasal secretions for changes in amount and color  - [place] appropriate cooling/warming therapies per order  - Administer medications as ordered  - Instruct and encourage patient and family to use good hand hygiene technique  - Identify and instruct in appropriate isolation precautions for identified infection/condition  Outcome: Completed     Problem: DISCHARGE PLANNING  Goal: Discharge to home or other facility with appropriate resources  Description: INTERVENTIONS:  - Identify barriers to discharge w/patient and caregiver  - Arrange for needed discharge resources and transportation as appropriate  - Identify discharge learning needs (meds, wound care, etc )  - Arrange for interpretive services to assist at discharge as needed  - Refer to Case Management Department for coordinating discharge planning if the patient needs post-hospital services based on physician/advanced practitioner order or complex needs related to functional status, cognitive ability, or social support system  Outcome: Completed Problem: Knowledge Deficit  Goal: Patient/family/caregiver demonstrates understanding of disease process, treatment plan, medications, and discharge instructions  Description: Complete learning assessment and assess knowledge base  Interventions:  - Provide teaching at level of understanding  - Provide teaching via preferred learning methods  Outcome: Completed     Problem: Nutrition/Hydration-ADULT  Goal: Nutrient/Hydration intake appropriate for improving, restoring or maintaining nutritional needs  Description: Monitor and assess patient's nutrition/hydration status for malnutrition  Collaborate with interdisciplinary team and initiate plan and interventions as ordered  Monitor patient's weight and dietary intake as ordered or per policy  Utilize nutrition screening tool and intervene as necessary  Determine patient's food preferences and provide high-protein, high-caloric foods as appropriate       INTERVENTIONS:  - Monitor oral intake, urinary output, labs, and treatment plans  - Assess nutrition and hydration status and recommend course of action  - Evaluate amount of meals eaten  - Assist patient with eating if necessary   - Allow adequate time for meals  - Recommend/ encourage appropriate diets, oral nutritional supplements, and vitamin/mineral supplements  - Order, calculate, and assess calorie counts as needed  - Recommend, monitor, and adjust tube feedings and TPN/PPN based on assessed needs  - Assess need for intravenous fluids  - Provide specific nutrition/hydration education as appropriate  - Include patient/family/caregiver in decisions related to nutrition  Outcome: Completed

## 2021-09-24 ENCOUNTER — TRANSITIONAL CARE MANAGEMENT (OUTPATIENT)
Dept: FAMILY MEDICINE CLINIC | Facility: CLINIC | Age: 36
End: 2021-09-24

## 2021-09-25 LAB — BACTERIA SPEC ANAEROBE CULT: ABNORMAL

## 2021-09-27 LAB
BACTERIA TISS AEROBE CULT: ABNORMAL
BACTERIA TISS AEROBE CULT: ABNORMAL
GRAM STN SPEC: ABNORMAL
GRAM STN SPEC: ABNORMAL

## 2021-09-28 NOTE — UTILIZATION REVIEW
Notification of Discharge   This is a Notification of Discharge from our facility [address]  Please be advised that this patient has been discharge from our facility  Below you will find the admission and discharge date and time including the patients disposition  UTILIZATION REVIEW CONTACT:  Ke Keys  Utilization   Network Utilization Review Department  Phone: [phone] x carefully listen to the prompts  All voicemails are confidential   Email: [email or web address]     PHYSICIAN ADVISORY SERVICES:  FOR [id number]-SF-CVTQ REVIEW - MEDICAL NECESSITY DENIAL  Phone: [phone]  Fax: [phone]  Email: [email or web address]     PRESENTATION DATE: 9/21/2021  4:32 PM  OBERVATION ADMISSION DATE:   INPATIENT ADMISSION DATE: 9/21/21  4:32 PM   DISCHARGE DATE: 9/23/2021  5:08 PM  DISPOSITION: Home/Self Care Home/Self Care      IMPORTANT INFORMATION:  Send all requests for admission clinical reviews, approved or denied determinations and any other requests to dedicated fax number below belonging to the campus where the patient is receiving treatment   List of dedicated fax numbers:  [address] DENIALS (Administrative/Medical Necessity) [phone]   [address] (Maternity/NICU/Pediatrics) [phone]   Jose Spaulding [phone]   Ben Black [phone]   Tamir Hirsch [phone]   [facility] [address] [place] [address] [phone]   [facility]  [phone]   [address]  [address] [facility] [address] [facility] [phone]

## 2021-09-29 ENCOUNTER — OFFICE VISIT (OUTPATIENT)
Dept: FAMILY MEDICINE CLINIC | Facility: CLINIC | Age: 36
End: 2021-09-29
Payer: COMMERCIAL

## 2021-09-29 VITALS
HEIGHT: 67 IN | SYSTOLIC BLOOD PRESSURE: 100 MMHG | DIASTOLIC BLOOD PRESSURE: 60 MMHG | HEART RATE: 50 BPM | TEMPERATURE: 97.5 F | BODY MASS INDEX: 20.72 KG/M2 | WEIGHT: 132 LBS | RESPIRATION RATE: 16 BRPM

## 2021-09-29 DIAGNOSIS — K04.7 DENTAL ABSCESS: Primary | ICD-10-CM

## 2021-09-29 PROCEDURE — 99495 TRANSJ CARE MGMT MOD F2F 14D: CPT | Performed by: FAMILY MEDICINE

## 2021-09-29 PROCEDURE — 1111F DSCHRG MED/CURRENT MED MERGE: CPT | Performed by: FAMILY MEDICINE

## 2021-09-29 RX ORDER — HYDROCODONE BITARTRATE AND ACETAMINOPHEN 5; 325 MG/1; MG/1
TABLET ORAL
COMMUNITY
Start: 2021-09-20 | End: 2021-09-29 | Stop reason: ALTCHOICE

## 2021-09-29 RX ORDER — ACETAMINOPHEN 325 MG/1
650 TABLET ORAL EVERY 6 HOURS PRN
COMMUNITY

## 2021-09-29 NOTE — PROGRESS NOTES
Assessment/Plan:       Diagnoses and all orders for this visit:    Dental abscess  Improved after I&D during hospital admission  She has follow up appointments with OMFS and dentist tomorrow  Continue augmentin  Pain is controlled  TCM Call (since 8/29/2021)     Date and time call was made  9/24/2021  9:43 AM    Hospital care reviewed  Records reviewed    Patient was hospitialized at  [address] [place] [address]        Date of Admission  09/21/21    Date of discharge  09/23/21    Diagnosis  Dental Abscess     Disposition  Home    Were the patients medications reviewed and updated  Yes    Current Symptoms  None      TCM Call (since 8/29/2021)     Post hospital issues  None    Should patient be enrolled in anticoag monitoring? No    Scheduled for follow up? Yes    Referrals needed  Bee Almaraz DMD (Oral Maxillofacial Surgery) in 1 week (10/1/2021)    Did you obtain your prescribed medications  Yes    Do you need help managing your prescriptions or medications  No    Is transportation to your appointment needed  No    I have advised the patient to call PCP with any new or worsening symptoms  Venancio Gann LPN    Living Arrangements  Family members; Children    Support System  Family    The type of support provided  Physical; Emotional    Do you have social support  Yes, as much as I need    Are you recieving any outpatient services  No    Are you recieving home care services  No    Interperter language line needed  No    Counseling  Patient    Counseling topics  Activities of daily living; Importance of RX compliance; patient and family education; instructions for management; Risk factor reduction    Comments  I spoke with Carlos Andersen who states that she is feeling much better  NO c/o at this time  She knows to call oral surgeon if any fevers, uncontrolled pain, etc Mat Barraza              Subjective:      Patient ID: Yolis Fontanez is a 29 y o  female      HPI   Carlos Nathaly is a 28 yo F who presents today for hospital discharge follow up  She was admitted at [address] [place] [address] from 9/21 to 9/23/21 for sublingual abscess and abscess in the left medial pterygoid muscle  Failed outpatient antibiotic course  OMFS was consulted  She had I & D in the OR  Tolerated well with no complications  She was discharged on augmentin  Pt states she has been doing well since discharge  Denies pain, fevers, chills  Able to eat fine  She has dentist as well as OMFS appointments tomorrow  Has been compliant with augmentin course  The following portions of the patient's history were reviewed and updated as appropriate: allergies, current medications, past family history, past medical history, past social history, past surgical history and problem list     Review of Systems   Constitutional: Negative  HENT: Negative  Eyes: Negative  Respiratory: Negative  Cardiovascular: Negative  Gastrointestinal: Negative  Endocrine: Negative  Genitourinary: Negative  Musculoskeletal: Negative  Skin: Negative  Allergic/Immunologic: Negative  Neurological: Negative  Hematological: Negative  Psychiatric/Behavioral: Negative  Objective:      /60   Pulse (!) 50   Temp 97 5 °F (36 4 °C)   Resp 16   Ht 5' 7" (1 702 m)   Wt 59 9 kg (132 lb)   LMP 09/01/2021   BMI 20 67 kg/m²          Physical Exam  Constitutional:       General: She is not in acute distress  Appearance: She is well-developed  She is not diaphoretic  HENT:      Head: Normocephalic and atraumatic  Mouth/Throat:      Mouth: Mucous membranes are moist       Pharynx: Oropharynx is clear  Cardiovascular:      Rate and Rhythm: Normal rate and regular rhythm  Heart sounds: Normal heart sounds  No murmur heard  No friction rub  No gallop  Pulmonary:      Effort: Pulmonary effort is normal  No respiratory distress  Breath sounds: Normal breath sounds  No wheezing or rales  Chest:      Chest wall: No tenderness  Musculoskeletal:         General: No deformity  Normal range of motion  Cervical back: Normal range of motion and neck supple  Skin:     General: Skin is warm and dry  Neurological:      Mental Status: She is alert and oriented to person, place, and time  Psychiatric:         Behavior: Behavior normal          Thought Content:  Thought content normal          Judgment: Judgment normal

## 2021-10-14 ENCOUNTER — APPOINTMENT (OUTPATIENT)
Dept: RADIOLOGY | Facility: CLINIC | Age: 36
End: 2021-10-14
Payer: COMMERCIAL

## 2021-10-14 ENCOUNTER — OFFICE VISIT (OUTPATIENT)
Dept: OBGYN CLINIC | Facility: CLINIC | Age: 36
End: 2021-10-14
Payer: COMMERCIAL

## 2021-10-14 VITALS
WEIGHT: 132 LBS | HEIGHT: 67 IN | SYSTOLIC BLOOD PRESSURE: 98 MMHG | HEART RATE: 58 BPM | DIASTOLIC BLOOD PRESSURE: 65 MMHG | BODY MASS INDEX: 20.72 KG/M2

## 2021-10-14 DIAGNOSIS — M79.644 PAIN OF FINGER OF RIGHT HAND: Primary | ICD-10-CM

## 2021-10-14 DIAGNOSIS — M79.644 PAIN OF FINGER OF RIGHT HAND: ICD-10-CM

## 2021-10-14 PROCEDURE — 3008F BODY MASS INDEX DOCD: CPT | Performed by: ORTHOPAEDIC SURGERY

## 2021-10-14 PROCEDURE — 1036F TOBACCO NON-USER: CPT | Performed by: ORTHOPAEDIC SURGERY

## 2021-10-14 PROCEDURE — 99213 OFFICE O/P EST LOW 20 MIN: CPT | Performed by: ORTHOPAEDIC SURGERY

## 2021-10-14 PROCEDURE — 73120 X-RAY EXAM OF HAND: CPT

## 2021-12-07 ENCOUNTER — OFFICE VISIT (OUTPATIENT)
Dept: FAMILY MEDICINE CLINIC | Facility: CLINIC | Age: 36
End: 2021-12-07
Payer: COMMERCIAL

## 2021-12-07 VITALS
RESPIRATION RATE: 16 BRPM | TEMPERATURE: 98.5 F | WEIGHT: 147 LBS | BODY MASS INDEX: 23.07 KG/M2 | DIASTOLIC BLOOD PRESSURE: 60 MMHG | HEIGHT: 67 IN | OXYGEN SATURATION: 100 % | HEART RATE: 81 BPM | SYSTOLIC BLOOD PRESSURE: 100 MMHG

## 2021-12-07 DIAGNOSIS — B34.9 VIRAL INFECTION, UNSPECIFIED: Primary | ICD-10-CM

## 2021-12-07 PROCEDURE — 99213 OFFICE O/P EST LOW 20 MIN: CPT | Performed by: FAMILY MEDICINE

## 2021-12-07 PROCEDURE — 3008F BODY MASS INDEX DOCD: CPT | Performed by: FAMILY MEDICINE

## 2021-12-07 PROCEDURE — 0241U HB NFCT DS VIR RESP RNA 4 TRGT: CPT | Performed by: FAMILY MEDICINE

## 2021-12-07 PROCEDURE — 1036F TOBACCO NON-USER: CPT | Performed by: FAMILY MEDICINE

## 2021-12-08 LAB
FLUAV RNA RESP QL NAA+PROBE: NEGATIVE
FLUBV RNA RESP QL NAA+PROBE: NEGATIVE
RSV RNA RESP QL NAA+PROBE: NEGATIVE
SARS-COV-2 RNA RESP QL NAA+PROBE: POSITIVE

## 2022-09-27 ENCOUNTER — OFFICE VISIT (OUTPATIENT)
Dept: DERMATOLOGY | Age: 37
End: 2022-09-27
Payer: COMMERCIAL

## 2022-09-27 VITALS — BODY MASS INDEX: 22.13 KG/M2 | TEMPERATURE: 97.2 F | HEIGHT: 67 IN | WEIGHT: 141 LBS

## 2022-09-27 DIAGNOSIS — L03.011 CHRONIC PARONYCHIA OF FINGER OF RIGHT HAND: Primary | ICD-10-CM

## 2022-09-27 PROCEDURE — 99203 OFFICE O/P NEW LOW 30 MIN: CPT | Performed by: DERMATOLOGY

## 2022-09-27 RX ORDER — CICLOPIROX OLAMINE 7.7 MG/100ML
1 SUSPENSION TOPICAL 2 TIMES DAILY
Qty: 30 ML | Refills: 1 | Status: SHIPPED | OUTPATIENT
Start: 2022-09-27 | End: 2022-10-27

## 2022-09-27 RX ORDER — CLOBETASOL PROPIONATE 0.46 MG/ML
SOLUTION TOPICAL 2 TIMES DAILY
Qty: 25 ML | Refills: 1 | Status: SHIPPED | OUTPATIENT
Start: 2022-09-27 | End: 2022-10-27

## 2022-09-27 NOTE — PROGRESS NOTES
[address] [facility] Note     Patient Name: Thania Hodgson  Encounter Date: 09/27/2022     Have you been cared for by a [facility] Dermatologist in the last 3 years and, if so, which one? No    · Have you traveled outside of the [address] [place] [address] in the past 3 months or outside of the [facility] area in the last 2 weeks? No     May we call your Preferred Phone number to discuss your specific medical information? Yes     May we leave a detailed message that includes your specific medical information? Yes      Today's Chief Concerns:   Concern #1:  Inflamed cuticle     Past Medical History:  Have you personally ever had or currently have any of the following? · Skin cancer (such as Melanoma, Basal Cell Carcinoma, Squamous Cell Carcinoma? (If Yes, please provide more detail)- No  · Eczema: No  · Psoriasis: No  · HIV/AIDS: No  · Hepatitis B or C: No  · Tuberculosis: No  · Systemic Immunosuppression such as Diabetes, Biologic or Immunotherapy, Chemotherapy, Organ Transplantation, Bone Marrow Transplantation (If YES, please provide more detail): No  · Radiation Treatment (If YES, please provide more detail): No  · Any other major medical conditions/concerns? 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 n/a    Family History:  Have any of your "first degree relatives" (parent, brother, sister, or child) had any of the following       · Skin cancer such as Melanoma or Merkel Cell Carcinoma or Pancreatic Cancer? No  · Eczema, Asthma, Hay Fever or Seasonal Allergies: No  · Psoriasis or Psoriatic Arthritis: No  · Do any other medical conditions seem to run in your family? If Yes, what condition and which relatives?   No    Current Medications:   (please update all dermatological medications before printing patient's AVS!)      Current Outpatient Medications:     acetaminophen (TYLENOL) 325 mg tablet, Take 650 mg by mouth every 6 (six) hours as needed for mild pain, Disp: , Rfl:     Multiple Vitamins-Minerals (ONE-A-DAY WOMENS PO), Take by mouth 18 mg, 100 mcg, 25 mcg , Disp: , Rfl:     Probiotic Product (PROBIOTIC PO), Take by mouth daily, Disp: , Rfl:       Review of Systems:  Have you recently had or currently have any of the following? If YES, what are you doing for the problem? · Fever, chills or unintended weight loss: No  · Sudden loss or change in your vision: No  · Nausea, vomiting or blood in your stool: No  · Painful or swollen joints: No  · Wheezing or cough: No  · Changing mole or non-healing wound: No  · Nosebleeds: No  · Excessive sweating: No  · Easy or prolonged bleeding? No  · Over the last 2 weeks, how often have you been bothered by the following problems? · Taking little interest or pleasure in doing things: 1 - Not at All  · Feeling down, depressed, or hopeless: 1 - Not at All  · Rapid heartbeat with epinephrine:  No    · FEMALES ONLY:    · Are you pregnant or planning to become pregnant? No  · Are you currently or planning to be nursing or breast feeding? No    · Any known allergies?       No Known Allergies      Physical Exam:     Was a chaperone (Derm Clinical Assistant) present throughout the entire Physical Exam? Yes    o     CONSTITUTIONAL:   Vitals:    09/27/22 1343   Temp: (!) 97 2 °F (36 2 °C)   TempSrc: Temporal   Weight: 64 kg (141 lb)   Height: 5' 7" (1 702 m)         PSYCH: Normal mood and affect  EYES: Normal conjunctiva  ENT: Normal lips and oral mucosa  CARDIOVASCULAR: No edema  RESPIRATORY: Normal respirations  HEME/LYMPH/IMMUNO:  No regional lymphadenopathy except as noted below in "ASSESSMENT AND PLAN BY DIAGNOSIS"    SKIN:  FULL ORGAN SYSTEM EXAM   Hair, Scalp, Ears, Face Normal except as noted below in Assessment   Neck, Cervical Chain Nodes Normal except as noted below in Assessment   Right Arm/Hand/Fingers Normal except as noted below in Assessment   Left Arm/Hand/Fingers Normal except as noted below in Assessment     Assessment and Plan by Diagnosis:    History of Present Condition:     Duration:  How long has this been an issue for you?    o  2 years    Location Affected:  Where on the body is this affecting you?    o  right 4th finger    Quality:  Is there any bleeding, pain, itch, burning/irritation, or redness associated with the skin lesion? o  inflamed, painful, sometimes ooze   Severity:  Describe any bleeding, pain, itch, burning/irritation, or redness on a scale of 1 to 10 (with 10 being the worst)  o     Timing:  Does this condition seem to be there pretty constantly or do you notice it more at specific times throughout the day? o  sometimes    Context:  Have you ever noticed that this condition seems to be associated with specific activities you do?    o  denies    Modifying Factors:    o Anything that seems to make the condition worse?    -  water   o What have you tried to do to make the condition better?    -  tried cephalexin and bactrim 2 years ago    Associated Signs and Symptoms:  Does this skin lesion seem to be associated with any of the following:  o  SL AMB DERM SIGNS AND SYMPTOMS: Redness     CHRONIC PARONYCHIA   Physical Exam:   Anatomic Location Affected:  Right 4th finger    Morphological Description:  Pink edematous plaque with slight scale    Pertinent Positives:   Pertinent Negatives: Additional History of Present Condition:  Started 2 years ago after getting cut at the nail salon  Pcp prescribed oral antibiotics cephalexin and bactrim with no improvement   Also consulted with hand surgeon who advised her to keep the area dry     Assessment and Plan:  Based on a thorough discussion of this condition and the management approach to it (including a comprehensive discussion of the known risks, side effects and potential benefits of treatment), the patient (family) agrees to implement the following specific plan:  [employer] Ciclopirox 0 77% solution twice a day to affected nail for 8 weeks    Start Clobetasol 0 05% solution twice  A day to affected nail for 8 weeks    Follow up in 2-3 months if not improving    If improved okay to cancel   Advised to keep area dry        Scribe Attestation    I,:  Santiago Redding am acting as a scribe while in the presence of the attending physician :       I,:  Leonora Craig MD personally performed the services described in this documentation    as scribed in my presence :

## 2022-09-27 NOTE — PATIENT INSTRUCTIONS
CHRONIC PARONYCHIA       Assessment and Plan:  Based on a thorough discussion of this condition and the management approach to it (including a comprehensive discussion of the known risks, side effects and potential benefits of treatment), the patient (family) agrees to implement the following specific plan:  Start Ciclopirox 0 77% solution twice a day topically to affected nail for 8 weeks   Start Clobetasol 0 05% solution twice a day to topically affected nail for 8 weeks   Follow up in 2-3 months   If improved okay to cancel  Advised to keep area dry

## 2023-11-17 ENCOUNTER — OFFICE VISIT (OUTPATIENT)
Dept: OBGYN CLINIC | Facility: CLINIC | Age: 38
End: 2023-11-17

## 2023-11-17 VITALS — WEIGHT: 140 LBS | DIASTOLIC BLOOD PRESSURE: 60 MMHG | BODY MASS INDEX: 21.93 KG/M2 | SYSTOLIC BLOOD PRESSURE: 100 MMHG

## 2023-11-17 DIAGNOSIS — Z01.419 WOMEN'S ANNUAL ROUTINE GYNECOLOGICAL EXAMINATION: Primary | ICD-10-CM

## 2023-11-17 PROCEDURE — G0476 HPV COMBO ASSAY CA SCREEN: HCPCS | Performed by: NURSE PRACTITIONER

## 2023-11-17 PROCEDURE — G0145 SCR C/V CYTO,THINLAYER,RESCR: HCPCS | Performed by: NURSE PRACTITIONER

## 2023-11-17 NOTE — PROGRESS NOTES
Subjective    HPI:     Mario Ngo is a 41 y.o. female. She is a [place], with a . Her menstrual cycles are regular and predictable. Her current method of contraception includes none. They have been trying since beginning of the year. Partner was found to have low sperm count. She denies issues with intimacy. She denies /GI and Gyn complaints. She feels safe at home. She denies depression/anxiety. Medical, surgical and family history reviewed. Her dental care is up-to-date. She eats a healthy diet and exercises regularly. She is happy with her weight. Visit Vitals  /60   Wt 63.5 kg (140 lb)   LMP 2023   BMI 21.93 kg/m²   OB Status Having periods   Smoking Status Never   BSA 1.74 m²       Gynecologic History    Patient's last menstrual period was 2023. Gardasil Vaccine Series: did not complete. Brochure provided. [facility]: normal cells with HPV    Obstetric and Medical History    OB History    Para Term  AB Living   1 1 1     1   SAB IAB Ectopic Multiple Live Births           1      # Outcome Date GA Lbr Tristin/2nd Weight Sex Delivery Anes PTL Lv   1 Term 17    M Vag-Spont   USAMA       No past medical history on file. Past Surgical History:   Procedure Laterality Date    INCISION AND DRAINAGE OF WOUND Left 2021    Procedure: INCISION AND DRAINAGE (I&D) HEAD/FACE and extraction of tooth #18;  Surgeon: Daiva Oppenheim, DMD;  Location: [facility];  Service: Maxillofacial       The following portions of the patient's history were reviewed and updated as appropriate: allergies, current medications, past family history, past medical history, past social history, past surgical history, and problem list.    Review of Systems    Pertinent items are noted in HPI. Objective    Physical Exam  Constitutional:       Appearance: Normal appearance. She is well-developed.    Genitourinary:      Vulva, bladder and urethral meatus normal.      No lesions in the vagina. Right Labia: No rash, tenderness, lesions, skin changes or Bartholin's cyst.     Left Labia: No tenderness, lesions, skin changes, Bartholin's cyst or rash. No labial fusion noted. No inguinal adenopathy present in the right or left side. No vaginal discharge, erythema, tenderness, bleeding or granulation tissue. No vaginal prolapse present. No vaginal atrophy present. Right Adnexa: not tender, not full and no mass present. Left Adnexa: not tender, not full and no mass present. Cervix is parous. No cervical motion tenderness, discharge, friability, lesion, polyp or nabothian cyst.      Uterus is not enlarged, tender, irregular or prolapsed. No uterine mass detected. Uterus is anteverted. Pelvic exam was performed with patient in the lithotomy position. Breasts:     Breasts are symmetrical.      Right: No inverted nipple, mass, nipple discharge, skin change or tenderness. Left: No inverted nipple, mass, nipple discharge, skin change or tenderness. HENT:      Head: Normocephalic and atraumatic. Neck:      Thyroid: No thyromegaly. Cardiovascular:      Rate and Rhythm: Normal rate and regular rhythm. Heart sounds: Normal heart sounds, S1 normal and S2 normal.   Pulmonary:      Effort: Pulmonary effort is normal.      Breath sounds: Normal breath sounds. Abdominal:      General: Bowel sounds are normal. There is no distension. Palpations: Abdomen is soft. There is no mass. Tenderness: There is no abdominal tenderness. There is no guarding. Hernia: There is no hernia in the left inguinal area or right inguinal area. Musculoskeletal:      Cervical back: Neck supple. Lymphadenopathy:      Cervical: No cervical adenopathy. Upper Body:      Right upper body: No supraclavicular or axillary adenopathy. Left upper body: No supraclavicular or axillary adenopathy.       Lower Body: No right inguinal adenopathy. No left inguinal adenopathy. Neurological:      Mental Status: She is alert. Skin:     General: Skin is warm and dry. Findings: No rash. Psychiatric:         Attention and Perception: Attention and perception normal.         Mood and Affect: Mood and affect normal.         Speech: Speech normal.         Behavior: Behavior is cooperative. Thought Content: Thought content normal.         Cognition and Memory: Cognition and memory normal.         Judgment: Judgment normal.   Vitals and nursing note reviewed. Assessment and Plan    Moose Norton was seen today for gynecologic exam.    Diagnoses and all orders for this visit:    Women's annual routine gynecological examination      Patient informed of a Stable GYN exam. A pap smear was performed. I have discussed the importance of exercise and healthy diet as well as adequate intake of calcium and vitamin D. The current ASCCP guidelines were reviewed. The low risk patient will receive pap smear screening every 3 years until the age of 34 and then every 3 to 5 years with HPV co-testing from the ages of 32-69. I emphasized the importance of an annual pelvic and breast exam. A yearly mammogram is recommended for breast cancer screening starting at age 36. Information for RMA provided. I explained she may need some assistance with IUI. Results will be released to [facility], if abnormal will call to review and discuss treatment plan. All questions have been answered to her satisfaction. Contraception: none. Follow up in: 1 year or sooner if needed.

## 2023-11-23 LAB
HPV HR 12 DNA CVX QL NAA+PROBE: NEGATIVE
HPV16 DNA CVX QL NAA+PROBE: NEGATIVE
HPV18 DNA CVX QL NAA+PROBE: NEGATIVE

## 2023-11-28 LAB
LAB AP GYN PRIMARY INTERPRETATION: NORMAL
Lab: NORMAL

## 2024-05-20 ENCOUNTER — NURSE TRIAGE (OUTPATIENT)
Age: 39
End: 2024-05-20

## 2024-05-20 NOTE — TELEPHONE ENCOUNTER
----- Message from Bri PAZ sent at 5/20/2024 11:22 AM EDT -----  Patient called stating that she is 5 days late with her period and is always on point. She has a lump on her lower abdomin and feels constipated. She has taken 3 HPT and they all came back negative.

## 2024-05-20 NOTE — TELEPHONE ENCOUNTER
"Patient states that she has an abdominal lump in her lower abdominal lump.  Patient states that she took 3 home pregnancy tests that were negative.  She denies taking any birth control or being under any excessive stress. She states she takes natural hormonal replacement supplements.  Advised patient to take another home pregnancy test in a couple days again and to also schedule an appointment for the abdominal lump to be checked out.  Patient wishes to call back in a couple days to see what happens.  She verbalized understanding and voiced appreciation for phone call.        Reason for Disposition   Menstrual period, missed or late    Answer Assessment - Initial Assessment Questions  1. LMP:  \"When did your last menstrual period begin?\"      4/18/24  2. DAYS LATE: \"How many days late is your menstrual period?\"      4-5 days late   3. REGULARITY: \"How regular are your periods?\"      Regular   4. PREGNANCY: \"Is there any chance you are pregnant?\" (e.g., unprotected intercourse, missed birth control pill, broken condom) \"Have you used a home pregnancy test?\"      Denies   5. BREASTFEEDING: \"Are you breastfeeding?\"      Denies   6. BIRTH CONTROL PILLS: \"Are you taking birth control pills, or have you stopped recently?\"      Denies   7. DEPOPROVERA: \"Has your doctor given you a shot to prevent pregnancy?\" (e.g., Depoprovera injection)      Denies   8. CAUSE: \"What do you think caused the missed period?\" (e.g., stress, rapid weight loss, excessive exercise)      Denies   9. OTHER SYMPTOMS: \"Do you have any other symptoms?\" (e.g., abdominal pain)      Abdominal lump    Protocols used: Menstrual Period - Missed or Late-ADULT-[place]    "

## 2024-06-13 ENCOUNTER — PATIENT MESSAGE (OUTPATIENT)
Dept: OBGYN CLINIC | Facility: CLINIC | Age: 39
End: 2024-06-13

## 2024-06-13 ENCOUNTER — NURSE TRIAGE (OUTPATIENT)
Age: 39
End: 2024-06-13

## 2024-06-13 DIAGNOSIS — O21.9 NAUSEA AND VOMITING IN PREGNANCY: Primary | ICD-10-CM

## 2024-06-13 RX ORDER — ONDANSETRON 4 MG/1
4 TABLET, FILM COATED ORAL EVERY 8 HOURS PRN
Qty: 20 TABLET | Refills: 0 | Status: SHIPPED | OUTPATIENT
Start: 2024-06-13

## 2024-06-13 NOTE — PATIENT COMMUNICATION
Patient called, pharmacy states they did not receive Zofran prescription. S/w MA in office, she  will contact pharmacy to have filled; verified correct pharmacy with pt: [facility] - [place], [place] - [address] [address]. Pt verbalized understanding.

## 2024-06-13 NOTE — TELEPHONE ENCOUNTER
Regarding: ob nausea  ----- Message from Mary Ellen ZACARIAS sent at 6/13/2024 11:12 AM EDT -----  Pt called. Pt is recently pregnant but has yet to be seen for D&V. Pt requested to talk to someone regarding symptoms she's having such as nausea. Warm transfer to [facility], no answer. Pt made aware of message sent.

## 2024-06-13 NOTE — TELEPHONE ENCOUNTER
"Patient calling reporting increased nausea for the past week that is constant and interfering with her activities of daily living and work.   She is scheduled for a D&V on  6/18/24.  LMP: 4/18/24.  She states she has been drinking ginger ale, she tried taking unisom and vitamin B6, as well as drinking lemon water, and states that nothing seems to be helping reduce the nausea.  She is requesting a potential rx that might help with the nausea over the weekend if possible.  She confirms her pharmacy is [facility] in [place].     Will forward to provider that she will be seeing for D&V to further address.      Reason for Disposition   MILD vomiting and present > 1 week and no improvement after using Morning Sickness Care Advice    Answer Assessment - Initial Assessment Questions  1. VOMITING SEVERITY: \"How many times have you vomited in the past 24 hours?\"      - MILD:  1 - 2 times/day     - MODERATE: 3 - 5 times/day, decreased oral intake without significant weight loss or symptoms of dehydration     - SEVERE: 6 or more times/day, vomits everything or nearly everything, with significant weight loss, symptoms of dehydration       Mild to moderate   2. ONSET: \"When did the vomiting begin?\"       Last week   3. FLUIDS: \"What fluids or food have you vomited up today?\" \"Are you able to keep any liquids down?\"      Yes   4. TREATMENT: \"What have you been doing so far to treat this?\"       Unisom, ginger ale, lemon  5. DEHYDRATION: \"When was the last time you urinated?\" \"Are you feeling lightheaded?\" \"Weight loss?\"      Denies   6. PREGNANCY: \"How many weeks pregnant are you?\" \"How has the pregnancy been going?\"      LMP:  4/18/24  7. HARDIK: \"What date are you expecting to deliver?\"      Not known yet   8. MEDICATIONS: \"What medications are you taking?\" (e.g., prenatal vitamins, iron)      Prenatal vitamins   9. OTHER SYMPTOMS: \"Do you have any other symptoms?\"      Denies    Protocols used: Pregnancy - Morning Sickness " (Nausea and Vomiting of Pregnancy)-ADULT-[place]

## 2024-06-18 ENCOUNTER — ULTRASOUND (OUTPATIENT)
Dept: OBGYN CLINIC | Facility: CLINIC | Age: 39
End: 2024-06-18
Payer: COMMERCIAL

## 2024-06-18 VITALS — BODY MASS INDEX: 23.81 KG/M2 | DIASTOLIC BLOOD PRESSURE: 50 MMHG | SYSTOLIC BLOOD PRESSURE: 90 MMHG | WEIGHT: 152 LBS

## 2024-06-18 DIAGNOSIS — Z36.89 ESTABLISH GESTATIONAL AGE, ULTRASOUND: Primary | ICD-10-CM

## 2024-06-18 DIAGNOSIS — O21.9 NAUSEA AND VOMITING IN PREGNANCY: ICD-10-CM

## 2024-06-18 PROCEDURE — 76817 TRANSVAGINAL US OBSTETRIC: CPT | Performed by: NURSE PRACTITIONER

## 2024-06-18 PROCEDURE — 99213 OFFICE O/P EST LOW 20 MIN: CPT | Performed by: NURSE PRACTITIONER

## 2024-06-18 RX ORDER — METOCLOPRAMIDE 10 MG/1
10 TABLET ORAL 4 TIMES DAILY
Qty: 20 TABLET | Refills: 0 | Status: SHIPPED | OUTPATIENT
Start: 2024-06-18 | End: 2024-06-23

## 2024-06-18 NOTE — PROGRESS NOTES
Viability and Dating Scan    Assessment    Nicole Forte is a 38 y.o.  presenting for amenorrhea and positive HPT. Patient's last menstrual period was 2024. This was a planned pregnancy. She is experiencing n/v and migraines. She is taking tylenol and Zofran with minimal relief.   Script for Reglan sent to pharmacy. Recommended magnesium 400 mg, Tylenol with 200 mg of caffeine for migraines    Additional Findings: none    FHR: 148    Assigning a Final HARDIK  Please choose how you are assigning the HARDIK: The gestational age by LMP is </= 8w 6d and demonstrates more than 5 days days difference from the gestational age by CRL, therefore the final HARDIK will be based on the ultrasound at this encounter    Final HARDIK: 25 by ultrasound at this encounter.    OC Flor  [facility] [address] [address]  Dept: [phone]  Dept Fax: [phone]  Loc Appt: [phone]  Loc: [phone]  Loc Fax: [phone]     Plan  - Prenatal vitamin and appropriate folic acid supplementation reviewed  - Return to office in 2 weeks for nursing intake visit  - Return to office in 4 weeks for initial prenatal visit    ___________________________________________________    Subjective  Nicole Forte is a 38 y.o. . She presents for viability scan. This was a planned pregnancy.     # 1 - Date: 17, Sex: Male, Weight: None, GA: None, Type: Vaginal, Spontaneous, Apgar1: None, Apgar5: None, Living: Living, Birth Comments: None      No past medical history on file.    Past Surgical History:   Procedure Laterality Date    INCISION AND DRAINAGE OF WOUND Left 2021    Procedure: INCISION AND DRAINAGE (I&D) HEAD/FACE and extraction of tooth #18;  Surgeon: Jourdan Perkins DMD;  Location: [facility];  Service: Maxillofacial       Social History     Socioeconomic History    Marital status: /Civil Union     Spouse name:  Not on file    Number of children: Not on file    Years of education: Not on file    Highest education level: Not on file   Occupational History    Not on file   Tobacco Use    Smoking status: Never    Smokeless tobacco: Never   Vaping Use    Vaping status: Never Used   Substance and Sexual Activity    Alcohol use: Yes    Drug use: Never    Sexual activity: Yes     Partners: Male     Birth control/protection: None   Other Topics Concern    Not on file   Social History Narrative    Not on file     Social Determinants of Health     Financial Resource Strain: Not on file   Food Insecurity: Not on file   Transportation Needs: Not on file   Physical Activity: Not on file   Stress: Not on file   Social Connections: Not on file   Intimate Partner Violence: Not on file   Housing Stability: Not on file       Objective    Visit Vitals  BP 90/50   Wt 68.9 kg (152 lb)   LMP 04/18/2024   BMI 23.81 kg/m²   OB Status Pregnant   Smoking Status Never   BSA 1.8 m²       OBGyn Exam    Ultrasound completed, please see Chart Review - Ob Procedures, Ultrasound Report for Interpretation.    Ultrasound Probe Disinfection    A transvaginal ultrasound was performed.   Prior to use, disinfection was performed with High Level Disinfection Process (Trophon)  Probe serial number [id number] was used    OC Flor  06/18/24  3:01 PM

## 2024-06-21 ENCOUNTER — INITIAL PRENATAL (OUTPATIENT)
Dept: OBGYN CLINIC | Facility: CLINIC | Age: 39
End: 2024-06-21

## 2024-06-21 ENCOUNTER — PATIENT MESSAGE (OUTPATIENT)
Dept: OBGYN CLINIC | Facility: CLINIC | Age: 39
End: 2024-06-21

## 2024-06-21 VITALS
BODY MASS INDEX: 23.95 KG/M2 | WEIGHT: 152.6 LBS | DIASTOLIC BLOOD PRESSURE: 58 MMHG | SYSTOLIC BLOOD PRESSURE: 100 MMHG | HEIGHT: 67 IN

## 2024-06-21 DIAGNOSIS — Z34.81 ENCOUNTER FOR SUPERVISION OF OTHER NORMAL PREGNANCY, FIRST TRIMESTER: Primary | ICD-10-CM

## 2024-06-21 DIAGNOSIS — Z31.430 ENCOUNTER OF FEMALE FOR TESTING FOR GENETIC DISEASE CARRIER STATUS FOR PROCREATIVE MANAGEMENT: ICD-10-CM

## 2024-06-21 PROCEDURE — OBC

## 2024-06-21 RX ORDER — CHOLECALCIFEROL (VITAMIN D3) 25 MCG
1 TABLET,CHEWABLE ORAL DAILY
COMMUNITY

## 2024-06-21 NOTE — PATIENT INSTRUCTIONS
Congratulations!! Please review our Pregnancy Essential Guide and [facility] L&D Virtual tour from our networks website.     [facility] Pregnancy Essentials Guide  [facility] ([email or web address])     [facility] ([employer])

## 2024-06-21 NOTE — PROGRESS NOTES
Details that I feel the provider should be aware of:   - h/o  2017 @ 40w0d [facility] at [facility]  - reports elevated BP in hospital PP but improved and did not require medication or additional monitoring once discharged and denies any h/o gHTN or cHTN  - last pap 23, denies h/o abn pap  - persistent nausea despite Reglan, Zofran, VitaminB6, Unisom - will f/u with provider for additional recommendations  - AMA  - referral to MFM placed at this time    OB INTAKE INTERVIEW  Patient is 38 y.o. who presents for OB intake at 7w5d  She is present alone during this encounter  The father of her baby (Sawyer) is Nicole's significant other, involved in the pregnancy and is 37 years old.      Last Menstrual Period: 24  Ultrasound: Measured 7 weeks 2 days on 24  Estimated Date of Delivery: 25  changed by 7 week US    Signs/Symptoms of Pregnancy  Current pregnancy symptoms: +nausea, persistent, all day, every day. Denies vomiting. Taking unisom and vitamin b6, dietary modifications, small frequent meals, previous zofran ineffective and received script for reglan. Taking reglan q6h and N has not worsened but not improved, still persistent. Aware will review with provider for additional recommendations and will f/u with pt once recommendations received. Pt states can send [employer] message for f/u.  Constipation YES - not going as frequently, reviewed.   Headaches YES- has improved s/p tx sinus infection. Reviewed tylenol and recommendations including when to call office.  Cramping/spotting no  PICA cravings no    Diabetes-  BMI 23.8  If patient has 1 or more, please order early 1 hour GTT  History of GDM no  BMI >35 no  History of PCOS or current metformin use no  History of LGA/macrosomic infant (4000g/9lbs) no    If patient has 2 or more, please order early 1 hour GTT  BMI>30 no  AMA YES  First degree relative with type 2 diabetes no  History of chronic HTN, hyperlipidemia, elevated A1C  no  High risk race (, , ,  or ) no    Hypertension- if you answer yes to any of the following, please order baseline preeclampsia labs (cbc, comprehensive metabolic panel, urine protein creatinine ratio, uric acid)  History of of chronic HTN no  History of gestational HTN no  History of preeclampsia, eclampsia, or HELLP syndrome no  History of diabetes no  History of lupus, autoimmune disease, kidney disease no    Thyroid- if yes order TSH with reflex T4  History of thyroid disease no    Bleeding Disorder or Hx of DVT-patient or first degree relative with history of. Order the following if not done previously.   (Factor V, antithrombin III, prothrombin gene mutation, protein C and S Ag, lupus anticoagulant, anticardiolipin, beta-2 glycoprotein)   no    OB/GYN-  History of abnormal pap smear no       Date of last pap smear 23  History of HPV no  History of Herpes/HSV no  History of other STI (gonorrhea, chlamydia, trich) no  History of prior  YES  History of prior  no  History of  delivery prior to 36 weeks 6 days no  History of blood transfusion no  Ok for blood transfusion yes    Substance screening-   History of tobacco use no  Currently using tobacco no  Substance Use Screen Level (N/A, LOW, HIGH) risk    MRSA Screening-   Does the pt have a hx of MRSA? no    Immunizations:  Influenza vaccine given this season n/a - reviewed  Discussed Tdap vaccine yes  Discussed COVID Vaccine yes    Genetic/[facility]-  Do you or your partner have a history of any of the following in yourselves or first degree relatives?  Cystic fibrosis no  Spinal muscular atrophy no  Hemoglobinopathy/Sickle Cell/Thalassemia no  Fragile X Intellectual Disability no    Reviewed, ordered, and pt verbalized understanding of prior auth/scheduling process.      Appointment for Nuchal Translucency Ultrasound at [facility] scheduled for - referral placed at this time      Interview  education  [facility] Pregnancy Essentials Book reviewed, discussed and attached to their AVS yes    Nurse/Family Partnership- patient may qualify no; referral placed no    Prenatal lab work scripts yes  Extra labs ordered:  CF and SMA    Aspirin/Preeclampsia Screen    Risk Level Risk Factor Recommendation   LOW Prior Uncomplicated full-term delivery YES No Aspirin recommendation        MODERATE Nulliparity no Recommend low-dose aspirin if     BMI>30 no 2 or more moderate risk factors    Family History Preeclampsia (mother/sister) no     35yr old or greater YES     Black Race, Concern for SDOH/Low Socioeconomic no     IVF Pregnancy  no     Personal History Risks (low birth weight, prior adverse preg outcome, >10yr preg interval) no         HIGH History of Preeclampsia no Recommend low-dose aspirin if     Multifetal gestation no 1 or more high risk factors    Chronic HTN no     Type 1 or 2 Diabetes no     Renal Disease no     Autoimmune Disease  no      Contraindications to ASA therapy:  NSAID/ ASA allergy: no  Nasal polyps: no  Asthma with history of ASA induced bronchospasm: no  Relative contraindications:  History of GI bleed: no  Active peptic ulcer disease: no  Severe hepatic dysfunction: no    Patient should be recommended to take ASA 162mg during this pregnancy from 12-36wks to lower her risk of preeclampsia: reviewed      The patient has a history now or in prior pregnancy notable for:AMA        PN1 visit scheduled. The patient was oriented to our practice, the navigator role, reviewed delivering physicians and [facility] for [facility]. All questions were answered.    Interviewed by: BLANCA Villagran RN

## 2024-06-24 NOTE — PATIENT COMMUNICATION
"ESC sent to on call provider to update and review recommendations at this time. Per on call provider, \"Can trial phenergan in addition but needs to know there are sedative effects and not to take with reglan. She can also go back to zofran and increase frequency to q4 hours but if did not help at all to begin with may be better to stick with reglan.\"    Placed call to patient and reviewed provider's recommendations. Nicole verbalized understanding, would like to continue with current Reglan regimen at this time 10mg po q6h as nausea not worsening and has been manageable at this time per patient. Patient aware if desires to trial phenergan or zofran as recommended, to notify office for prescription and further review if needed. Nicole had no additional questions at this time.  "

## 2024-07-03 ENCOUNTER — TELEPHONE (OUTPATIENT)
Age: 39
End: 2024-07-03

## 2024-07-03 DIAGNOSIS — O21.9 NAUSEA AND VOMITING IN PREGNANCY: ICD-10-CM

## 2024-07-03 RX ORDER — ONDANSETRON 4 MG/1
4 TABLET, FILM COATED ORAL EVERY 8 HOURS PRN
Qty: 20 TABLET | Refills: 0 | Status: CANCELLED | OUTPATIENT
Start: 2024-07-03

## 2024-07-03 RX ORDER — ONDANSETRON 4 MG/1
4 TABLET, FILM COATED ORAL 4 TIMES DAILY
Qty: 30 TABLET | Refills: 0 | Status: SHIPPED | OUTPATIENT
Start: 2024-07-03

## 2024-07-03 NOTE — TELEPHONE ENCOUNTER
Patient tried the Reglan and it did not work , was advised by Katherine SEPULVEDA to double up on the Zofran so she has been taking it every 4 hrs and is now out of the medication .  And needs a refill .PRINCESS DIGGS

## 2024-07-03 NOTE — TELEPHONE ENCOUNTER
Patient called in stating that she is 9w3d pregnant, and at her last prenatal visit she was notified that her Zofran would be refilled. Patient stating that the prescription wasn't sent to the pharmacy, please advise

## 2024-07-03 NOTE — TELEPHONE ENCOUNTER
Pt called with questions regarding denial for Zofran. Please f/u with pt at your earliest convenience. Thank you.

## 2024-07-03 NOTE — TELEPHONE ENCOUNTER
Care after nasal cautery:  1. Remove packing before bedtime tonight.  2. Apply a pea-sized amount of Vaseline ointment to the nostrils three times per day for 3 weeks.  Avoid placing the ointment before bed.  3. For 1 week avoid vigorous activity such as running, weight lifting, bending at the waist, blowing your nose, sneezing through the nose (sneeze through your mouth).    If a nose bleed happens again:  1. Squirt several sprays of Afrin (oxymetazoline) into each nostril.  2. Soak a cotton ball with Afrin (oxymetazoline) nasal spray.  3. Place the cotton ball in the nostril that is bleeding.  4. Pinch your nose so both nostrils are completely shut.  5. Put your chin to your chest for 15 - 30 minutes.    You can put some cotton balls in a small sandwich bag and have them soak in Afrin so they are readily available if you are out of your home.    Afrin is an over-the-counter nasal spray - the generic version is fine to use.  The name of the generic version is oxymetazoline.    If the bleed does not stop after 2 or 3 tries give the clinic a call or report to Urgent Care/Walk In/Emergency Department.    If you use a CPAP do not use it for tonight but may resume tomorrow night.     Pt called in requesting a refill of her zofran. Reports she is 9w3d pregnant and having pretty intense nausea daily. States she was given zofran in the past and it helps. Would like a refill sent to the [facility]. Denies any vomiting and is able to stay hydrated. Advised will send refill over to be signed. Pt thankful.

## 2024-07-06 ENCOUNTER — NURSE TRIAGE (OUTPATIENT)
Dept: OTHER | Facility: OTHER | Age: 39
End: 2024-07-06

## 2024-07-08 NOTE — TELEPHONE ENCOUNTER
PA for Zofran    Submitted via    []CMM-KEY   [x]Mackriishan-Case ID #   []Faxed to plan   []Other website   []Phone call Case ID #     Office notes sent, clinical questions answered. Awaiting determination    Turnaround time for your insurance to make a decision on your Prior Authorization can take 7-21 business days.

## 2024-07-08 NOTE — TELEPHONE ENCOUNTER
"Reason for Disposition  • Fever with no signs of serious infection AND no localizing symptoms  (all other triage questions negative)    Answer Assessment - Initial Assessment Questions  1. TEMPERATURE: \"What is the most recent temperature?\"  \"How was it measured?\"       100.5 (tympanic)    2. ONSET: \"When did the fever start?\"       2 days ago    3. SYMPTOMS: \"Do you have any other symptoms besides the fever?\"   (e.g., cough, cold symptoms, sore throat, rash, diarrhea, vomiting, abdominal pain, urine problems)      Body aches, sore throat    4. CAUSE: If there are no symptoms, ask: \"What do you think is causing the fever?\"       N/A    5. CONTACTS: \"Does anyone else in the family have an infection?\"      Denies    6. TREATMENT: \"What have you done so far to treat this fever?\" (e.g., medications)      Tylenol    7. IMMUNOCOMPROMISE: \"Do you have of the following: diabetes, HIV positive, splenectomy, chronic steroid treatment, transplant patient, etc.\"      Denies    8. OTHER SYMPTOMS: \"Do you have any other symptoms?\" (e.g., abdominal pain, fever, vaginal bleeding, decreased fetal movement)      Abdominal cramping    9. HARDIK: \"What date are you expecting to deliver?      2/2/2025    10. TRAVEL: \"Have you traveled out of the country in the last month?\" (e.g., travel history, exposures)        Denies    Protocols used: Pregnancy - Fever-ADULT-AH    "
"Regarding: 10 Weeks Pregnant, Fever, Taking 1000 mg of Tylenol 4 Times over 36 Hours  ----- Message from Sangeeta FINCH sent at 7/6/2024  5:36 PM EDT -----  \" I am 10 Weeks Pregnant and I have had a fever since Thursday night, I have been taking 1000 mg of Tylenol 4 times over the course of 36 hours. I have Body Aches and a Sore Throat.\"    "
On call provider paged. Recommended safe to take Motrin this early in pregnancy and may alternate with Tylenol. Provider also recommended pt consider getting test at [facility] over the weekend for flu/COVID as there are treatments that are safe to take during pregnancy. Please follow up with office next week to schedule appointment. Pt unavailable. RN left detailed VM.   
Patient has appointment 07/19/24 with Svetlana . PRINCESS DIGGS   
- - -

## 2024-07-09 NOTE — TELEPHONE ENCOUNTER
PA for Zofran Approved     Date(s) approved July 8, 2024 to July 8, 2025     Case #    Patient advised by          [x] [employer] Message  [] Phone call   []LMOM  []L/M to call office as no active Communication consent on file  []Unable to leave detailed message as VM not approved on Communication consent       Pharmacy advised by    [x]Fax  []Phone call    Approval letter scanned into Media Yes

## 2024-07-17 ENCOUNTER — TELEPHONE (OUTPATIENT)
Age: 39
End: 2024-07-17

## 2024-07-17 NOTE — TELEPHONE ENCOUNTER
Patient is calling because she is scheduled to come in for a (part 2) of her intake at 11wks.  She is scheduled for the 19th at 8:30 however she thought it was for 8 and can not make any time after.          There is no availability soonish to that time with a provider, so reaching out to see if there is some way to squeeze her in or when she could possibly reschedule for within an appropriate time frame.    Please call patient back or [employer] message if applicable.

## 2024-07-19 ENCOUNTER — ROUTINE PRENATAL (OUTPATIENT)
Facility: HOSPITAL | Age: 39
End: 2024-07-19
Payer: COMMERCIAL

## 2024-07-19 ENCOUNTER — APPOINTMENT (OUTPATIENT)
Dept: LAB | Facility: AMBULARY SURGERY CENTER | Age: 39
End: 2024-07-19
Payer: COMMERCIAL

## 2024-07-19 ENCOUNTER — INITIAL PRENATAL (OUTPATIENT)
Dept: OBGYN CLINIC | Facility: CLINIC | Age: 39
End: 2024-07-19

## 2024-07-19 VITALS — WEIGHT: 152 LBS | BODY MASS INDEX: 23.81 KG/M2 | SYSTOLIC BLOOD PRESSURE: 100 MMHG | DIASTOLIC BLOOD PRESSURE: 60 MMHG

## 2024-07-19 VITALS
SYSTOLIC BLOOD PRESSURE: 112 MMHG | HEART RATE: 60 BPM | HEIGHT: 67 IN | DIASTOLIC BLOOD PRESSURE: 60 MMHG | BODY MASS INDEX: 24.15 KG/M2 | WEIGHT: 153.88 LBS

## 2024-07-19 DIAGNOSIS — Z34.01 ENCOUNTER FOR SUPERVISION OF NORMAL FIRST PREGNANCY IN FIRST TRIMESTER: Primary | ICD-10-CM

## 2024-07-19 DIAGNOSIS — O34.11 UTERINE FIBROID COMPLICATING ANTENATAL CARE, BABY NOT YET DELIVERED IN FIRST TRIMESTER: ICD-10-CM

## 2024-07-19 DIAGNOSIS — Z33.1 PREGNANT STATE, INCIDENTAL: ICD-10-CM

## 2024-07-19 DIAGNOSIS — Z36.89 ENCOUNTER FOR OTHER SPECIFIED ANTENATAL SCREENING: ICD-10-CM

## 2024-07-19 DIAGNOSIS — D25.9 UTERINE FIBROID COMPLICATING ANTENATAL CARE, BABY NOT YET DELIVERED IN FIRST TRIMESTER: ICD-10-CM

## 2024-07-19 DIAGNOSIS — Z36.9 UNSPECIFIED ANTENATAL SCREENING: ICD-10-CM

## 2024-07-19 DIAGNOSIS — Z36.82 ENCOUNTER FOR NUCHAL TRANSLUCENCY TESTING: ICD-10-CM

## 2024-07-19 DIAGNOSIS — Z34.81 ENCOUNTER FOR SUPERVISION OF OTHER NORMAL PREGNANCY, FIRST TRIMESTER: ICD-10-CM

## 2024-07-19 DIAGNOSIS — O09.521 MULTIGRAVIDA OF ADVANCED MATERNAL AGE IN FIRST TRIMESTER: ICD-10-CM

## 2024-07-19 DIAGNOSIS — Z3A.11 11 WEEKS GESTATION OF PREGNANCY: Primary | ICD-10-CM

## 2024-07-19 LAB
ABO GROUP BLD: NORMAL
BACTERIA UR QL AUTO: ABNORMAL /HPF
BASOPHILS # BLD AUTO: 0.02 THOUSANDS/ÂΜL (ref 0–0.1)
BASOPHILS NFR BLD AUTO: 0 % (ref 0–1)
BILIRUB UR QL STRIP: NEGATIVE
BLD GP AB SCN SERPL QL: NEGATIVE
CLARITY UR: CLEAR
COLOR UR: ABNORMAL
EOSINOPHIL # BLD AUTO: 0.02 THOUSAND/ÂΜL (ref 0–0.61)
EOSINOPHIL NFR BLD AUTO: 0 % (ref 0–6)
ERYTHROCYTE [DISTWIDTH] IN BLOOD BY AUTOMATED COUNT: 13.4 % (ref 11.6–15.1)
GLUCOSE UR STRIP-MCNC: NEGATIVE MG/DL
HBV SURFACE AG SER QL: NORMAL
HCT VFR BLD AUTO: 32.9 % (ref 34.8–46.1)
HCV AB SER QL: NORMAL
HGB BLD-MCNC: 11.2 G/DL (ref 11.5–15.4)
HGB UR QL STRIP.AUTO: NEGATIVE
IMM GRANULOCYTES # BLD AUTO: 0.13 THOUSAND/UL (ref 0–0.2)
IMM GRANULOCYTES NFR BLD AUTO: 1 % (ref 0–2)
KETONES UR STRIP-MCNC: NEGATIVE MG/DL
LEUKOCYTE ESTERASE UR QL STRIP: NEGATIVE
LYMPHOCYTES # BLD AUTO: 1.84 THOUSANDS/ÂΜL (ref 0.6–4.47)
LYMPHOCYTES NFR BLD AUTO: 19 % (ref 14–44)
MCH RBC QN AUTO: 33.1 PG (ref 26.8–34.3)
MCHC RBC AUTO-ENTMCNC: 34 G/DL (ref 31.4–37.4)
MCV RBC AUTO: 97 FL (ref 82–98)
MONOCYTES # BLD AUTO: 0.49 THOUSAND/ÂΜL (ref 0.17–1.22)
MONOCYTES NFR BLD AUTO: 5 % (ref 4–12)
NEUTROPHILS # BLD AUTO: 7.23 THOUSANDS/ÂΜL (ref 1.85–7.62)
NEUTS SEG NFR BLD AUTO: 75 % (ref 43–75)
NITRITE UR QL STRIP: NEGATIVE
NON-SQ EPI CELLS URNS QL MICRO: ABNORMAL /HPF
NRBC BLD AUTO-RTO: 0 /100 WBCS
PH UR STRIP.AUTO: 6.5 [PH]
PLATELET # BLD AUTO: 334 THOUSANDS/UL (ref 149–390)
PMV BLD AUTO: 9.5 FL (ref 8.9–12.7)
PROT UR STRIP-MCNC: NEGATIVE MG/DL
RBC # BLD AUTO: 3.38 MILLION/UL (ref 3.81–5.12)
RBC #/AREA URNS AUTO: ABNORMAL /HPF
RH BLD: POSITIVE
RUBV IGG SERPL IA-ACNC: 21 IU/ML
SP GR UR STRIP.AUTO: 1.02 (ref 1–1.03)
SPECIMEN EXPIRATION DATE: NORMAL
UROBILINOGEN UR STRIP-ACNC: <2 MG/DL
WBC # BLD AUTO: 9.73 THOUSAND/UL (ref 4.31–10.16)
WBC #/AREA URNS AUTO: ABNORMAL /HPF

## 2024-07-19 PROCEDURE — 86803 HEPATITIS C AB TEST: CPT

## 2024-07-19 PROCEDURE — 86850 RBC ANTIBODY SCREEN: CPT

## 2024-07-19 PROCEDURE — 83020 HEMOGLOBIN ELECTROPHORESIS: CPT

## 2024-07-19 PROCEDURE — 85025 COMPLETE CBC W/AUTO DIFF WBC: CPT

## 2024-07-19 PROCEDURE — 36415 COLL VENOUS BLD VENIPUNCTURE: CPT | Performed by: OBSTETRICS & GYNECOLOGY

## 2024-07-19 PROCEDURE — 87389 HIV-1 AG W/HIV-1&-2 AB AG IA: CPT

## 2024-07-19 PROCEDURE — 76813 OB US NUCHAL MEAS 1 GEST: CPT | Performed by: OBSTETRICS & GYNECOLOGY

## 2024-07-19 PROCEDURE — 99243 OFF/OP CNSLTJ NEW/EST LOW 30: CPT | Performed by: OBSTETRICS & GYNECOLOGY

## 2024-07-19 PROCEDURE — 86780 TREPONEMA PALLIDUM: CPT

## 2024-07-19 PROCEDURE — 87340 HEPATITIS B SURFACE AG IA: CPT

## 2024-07-19 PROCEDURE — 76801 OB US < 14 WKS SINGLE FETUS: CPT | Performed by: OBSTETRICS & GYNECOLOGY

## 2024-07-19 PROCEDURE — 86900 BLOOD TYPING SEROLOGIC ABO: CPT

## 2024-07-19 PROCEDURE — 87086 URINE CULTURE/COLONY COUNT: CPT

## 2024-07-19 PROCEDURE — 81001 URINALYSIS AUTO W/SCOPE: CPT

## 2024-07-19 PROCEDURE — PNV: Performed by: NURSE PRACTITIONER

## 2024-07-19 PROCEDURE — 36415 COLL VENOUS BLD VENIPUNCTURE: CPT

## 2024-07-19 PROCEDURE — 86762 RUBELLA ANTIBODY: CPT

## 2024-07-19 PROCEDURE — 86901 BLOOD TYPING SEROLOGIC RH(D): CPT

## 2024-07-19 RX ORDER — FAMOTIDINE 20 MG/1
20 TABLET, FILM COATED ORAL 2 TIMES DAILY
COMMUNITY

## 2024-07-19 NOTE — PROGRESS NOTES
Nicole Forte is a 38 y.o.  at 11w5d.  She is doing well. Denies LOF/Bleeding/Cramping. Denies n/v/Ha, edema. Denies tobacco, drugs or ETOH use. Denies DV.     Last pap smear was 23; NILM w/neg HPV    Visit Vitals  /60   Wt 68.9 kg (152 lb)   LMP 2024   BMI 23.81 kg/m²   OB Status Pregnant   Smoking Status Never   BSA 1.8 m²     Pre-Thuy Vitals      Flowsheet Row Most Recent Value   Prenatal Assessment    Fetal Heart Rate 156   Fundal Height (cm) 20 cm   Movement Absent   Prenatal Vitals    Blood Pressure 100/60   Weight - Scale 68.9 kg (152 lb)   Urine Albumin/Glucose    Dilation/Effacement/Station    Vaginal Drainage    Edema           Urine neg/neg    Prenatal exam: Fundal height is greater then gestational weeks.   Bedside US done to obtain FHR. There is what appears to be a mass on her uterus. She has an appt with [facility] today for her NT scan.     Diagnoses and all orders for this visit:    Encounter for supervision of normal first pregnancy in first trimester    Encounter for other specified  screening  -     Genital Comprehensive Culture  -     Chlamydia/GC amplified DNA by PCR        Nicole was reminded to get her prenatal labs draw. Orders are in epic.     Vaginal culture collected today.    RTO in 4 weeks for PNV or sooner if needed.

## 2024-07-19 NOTE — PROGRESS NOTES
Patient chose to have [facility] [id number] Non-Invasive Prenatal Screen [id number] PLUS w/ SCA, WITH fetal sex.  Patient choose to be billed through insurance.     Patient given brochure and is aware [facility] will contact patient's insurance and coordinate coverage.  Provided [facility] contact information. General inquiries [phone], Cost estimates [phone] and [facility] Billing [phone]. Website [email or web address].     Blood collection tubes labeled with patient identifiers (name, medical record number, and date of birth).     Filled out [facility] order form. Patient chose to have blood drawn in our office at time of visit. NIPS was drawn from left arm with a butterfly needle by Mily FROST MA.       If patient chose to have blood work drawn at a [facility] lab we requested patient notify MFM (via phone call or [employer] message) when blood collected so office can follow up on results.       Maternal Fetal Medicine will have results in approximately 5-7 business days and will call patient or notify via [employer].  Patient aware viewing lab result online will reveal fetal sex if ordered.    Patient verbalized understanding of all instructions and no questions at this time.

## 2024-07-19 NOTE — Clinical Note
Good morning Dr. Mcneill, I saw Nicole today for her prenatal physical. She is scheduled with [facility] later today. She is 11w 5d. On exam I noticed her fundus is measuring 20 cm. I did a bedside transabdominal US to get a FHR and I noticed that there is a large mass. I just wanted to give you a heads up.   Have a good day.  OC Flor

## 2024-07-20 ENCOUNTER — NURSE TRIAGE (OUTPATIENT)
Dept: OTHER | Facility: OTHER | Age: 39
End: 2024-07-20

## 2024-07-20 LAB
BACTERIA UR CULT: NORMAL
HIV 1+2 AB+HIV1 P24 AG SERPL QL IA: NORMAL
HIV 2 AB SERPL QL IA: NORMAL
HIV1 AB SERPL QL IA: NORMAL
HIV1 P24 AG SERPL QL IA: NORMAL
TREPONEMA PALLIDUM IGG+IGM AB [PRESENCE] IN SERUM OR PLASMA BY IMMUNOASSAY: NORMAL

## 2024-07-20 NOTE — TELEPHONE ENCOUNTER
"Reason for Disposition   Unusual vaginal discharge (e.g., bad smelling, yellow, green, or foamy-white)    Answer Assessment - Initial Assessment Questions  1. LOCATION: \"Where does it hurt?\"       Left lower abdomin cramping    2. RADIATION: \"Does the pain shoot anywhere else?\" (e.g., chest, back, shoulder)      Denies    3. ONSET: \"When did the pain begin?\" (e.g., minutes, hours or days ago)       This AM    4. ONSET: \"Gradual or sudden onset?\"      Sudden    5. PATTERN \"Does the pain come and go, or has it been constant since it started?\"       Constant    6. SEVERITY: \"How bad is the pain?\" \"What does it keep you from doing?\"  (e.g., Scale 1-10; mild, moderate, or severe)    - MILD (1-3): doesn't interfere with normal activities, abdomen soft and not tender to touch     - MODERATE (4-7): interferes with normal activities or awakens from sleep, tender to touch     - SEVERE (8-10): excruciating pain, doubled over, unable to do any normal activities     Mild    7. RECURRENT SYMPTOM: \"Have you ever had this type of stomach pain before?\" If Yes, ask: \"When was the last time?\" and \"What happened that time?\"       Denies    8. CAUSE: \"What do you think is causing the stomach pain?\"      Had culture done yesterday at visit    9. RELIEVING/AGGRAVATING FACTORS: \"What makes it better or worse?\" (e.g., antacids, bowel movement, movement)      Denies    10. OTHER SYMPTOMS: \"Has there been any vaginal bleeding, fever, vomiting, diarrhea, or urine problems?\"        Dark brown - ongoing since first noticed this AM  Denies it being bright red; fluid leaking    11. HARDIK: \"What date are you expecting to deliver?\"        Feb 2    Protocols used: Pregnancy - Abdominal Pain Less Than 20 Weeks EGA-ADULT-AH      Symptoms to monitor for reviewed with patient; call back guidelines provided; pt verbalized understanding.      Please follow up with patient on Monday.  "

## 2024-07-20 NOTE — TELEPHONE ENCOUNTER
"Regardin Weeks Pregnant, Brown Discharge, Cramping  ----- Message from Sangeeta FINCH sent at 2024  3:11 PM EDT -----  \" I am 12 weeks pregnant, and today I noticed a dark brown discharge and I have some cramping. \"    "

## 2024-07-21 NOTE — PROGRESS NOTES
Nicole presents today for a genetic screening ultrasound.  This is her second pregnancy.  First pregnancy resulted in a full-term vaginal livery in 2017 without complications.  She has no significant medical or surgical history.  Substance use history is unremarkable.  Family history is unremarkable.  A review of systems is otherwise negative.    We discussed the options for genetic screening, including but not limited to first trimester screening, second trimester screening, combined first and second trimester screening, noninvasive prenatal screening (NIPS) for patients at high risk and diagnostic screening through the use of CVS and amniocentesis. We discussed the risks and benefits of each approach including the sensitivities and false positive rates as well as the difference between a screening test and a diagnostic test. At the conclusion of our discussion the patient elected noninvasive prenatal screening utilizing the MaterniT 21 plus test. The patient had this bloowork drawn in the office.   The results should be available in approximately 7-10 days.    The patient was identified today as having a very large subserosal fibroid.  Uterine leiomyomas/fibroids are smooth muscle tumors of the uterus which are generally benign. They are the most common tumor of the female reproductive tract.  They usually do not  significantly cause an increased risk of adverse outcomes in pregnancy unless they are large or numerous. Pregnancy complications may include risk of degeneration which can cause maternal pain, an increased risk of postpartum hemorrhage due to the inability of the uterus to contract after delivery, and risk of fetal growth restriction or malpresentation. Due to these potential risks, I recommend a follow-up growth ultrasound in the third trimester. We will continue to monitor the size of the fibroid(s) and fetal growth and presentation throughout her her pregnancy.    We discussed follow-up in detail and  "I recommend an anatomy ultrasound be scheduled for 20 weeks gestation.    Thank you very much for allowing us to participate in the care of this very nice patient. Should you have any questions, please do not hesitate to contact me.    Portions of the record may have been created with voice recognition software. Occasional wrong word or \"sound a like\" substitutions may have occurred due to the inherent limitations of voice recognition software. Read the chart carefully and recognize, using context, where substitutions have occurred.  "

## 2024-07-23 LAB
A VAGINAE DNA VAG QL NAA+PROBE: NORMAL SCORE
BVAB2 DNA VAG QL NAA+PROBE: NORMAL SCORE
C ALBICANS DNA VAG QL NAA+PROBE: NEGATIVE
C GLABRATA DNA VAG QL NAA+PROBE: NEGATIVE
C TRACH RRNA SPEC QL NAA+PROBE: NEGATIVE
MEGA1 DNA VAG QL NAA+PROBE: NORMAL SCORE
N GONORRHOEA RRNA SPEC QL NAA+PROBE: NEGATIVE
T VAGINALIS RRNA SPEC QL NAA+PROBE: NEGATIVE

## 2024-07-23 NOTE — TELEPHONE ENCOUNTER
I called pt to offer appt today, but when I called pt and pt said she is okay.  Cramping and bleeding stopped.

## 2024-07-26 LAB
CFDNA.FET/CFDNA.TOTAL SFR FETUS: NORMAL %
CITATION REF LAB TEST: NORMAL
FET 13+18+21+X+Y ANEUP PLAS.CFDNA: NEGATIVE
FET CHR 21 TS PLAS.CFDNA QL: NEGATIVE
FET CHR 21 TS PLAS.CFDNA QL: NEGATIVE
FET MS X RISK WBC.DNA+CFDNA QL: NOT DETECTED
FET SEX PLAS.CFDNA DOSAGE CFDNA: NORMAL
FET TS 13 RISK PLAS.CFDNA QL: NEGATIVE
FET X + Y ANEUP RISK PLAS.CFDNA SEQ-IMP: NOT DETECTED
GA EST FROM CONCEPTION DATE: NORMAL D
GESTATIONAL AGE > 9:: YES
HGB A MFR BLD: 2.4 % (ref 1.8–3.2)
HGB A MFR BLD: 97.6 % (ref 96.4–98.8)
HGB F MFR BLD: 0 % (ref 0–2)
HGB FRACT BLD-IMP: NORMAL
HGB S MFR BLD: 0 %
LAB DIRECTOR NAME PROVIDER: NORMAL
LAB DIRECTOR NAME PROVIDER: NORMAL
LABORATORY COMMENT REPORT: NORMAL
LIMITATIONS OF THE TEST: NORMAL
NEGATIVE PREDICTIVE VALUE: NORMAL
PERFORMANCE CHARACTERISTICS: NORMAL
POSITIVE PREDICTIVE VALUE: NORMAL
REF LAB TEST METHOD: NORMAL
SL AMB NOTE:: NORMAL
TEST PERFORMANCE INFO SPEC: NORMAL

## 2024-07-26 NOTE — RESULT ENCOUNTER NOTE
I have reviewed the results of the NIPS which are low risk.  Please call patient and notify her of these reassuring results if she has not viewed on MyChart. Please ensure she is notified of recommendation of MSAFP to be ordered and followed up through her primary Obstetrician's office.      Thank you, Spencer Mcneill MD

## 2024-07-31 ENCOUNTER — TELEPHONE (OUTPATIENT)
Age: 39
End: 2024-07-31

## 2024-09-04 ENCOUNTER — ROUTINE PRENATAL (OUTPATIENT)
Dept: OBGYN CLINIC | Facility: CLINIC | Age: 39
End: 2024-09-04

## 2024-09-04 ENCOUNTER — APPOINTMENT (OUTPATIENT)
Dept: LAB | Facility: AMBULARY SURGERY CENTER | Age: 39
End: 2024-09-04
Payer: COMMERCIAL

## 2024-09-04 VITALS
BODY MASS INDEX: 24.9 KG/M2 | WEIGHT: 159 LBS | RESPIRATION RATE: 99 BRPM | HEART RATE: 55 BPM | DIASTOLIC BLOOD PRESSURE: 60 MMHG | SYSTOLIC BLOOD PRESSURE: 90 MMHG

## 2024-09-04 DIAGNOSIS — Z36.89 ENCOUNTER FOR OTHER SPECIFIED ANTENATAL SCREENING: ICD-10-CM

## 2024-09-04 DIAGNOSIS — O34.11 UTERINE FIBROID COMPLICATING ANTENATAL CARE, BABY NOT YET DELIVERED IN FIRST TRIMESTER: Primary | ICD-10-CM

## 2024-09-04 DIAGNOSIS — D25.9 UTERINE FIBROID COMPLICATING ANTENATAL CARE, BABY NOT YET DELIVERED IN FIRST TRIMESTER: Primary | ICD-10-CM

## 2024-09-04 PROCEDURE — 36415 COLL VENOUS BLD VENIPUNCTURE: CPT

## 2024-09-04 PROCEDURE — PNV: Performed by: NURSE PRACTITIONER

## 2024-09-04 PROCEDURE — 82105 ALPHA-FETOPROTEIN SERUM: CPT

## 2024-09-04 NOTE — PROGRESS NOTES
Nicole Forte is a 38 y.o.  at 18w3d. Reports +FM  She is doing well. Denies LOF/Bleeding/Cramping. Denies n/v/Ha, edema. Denies tobacco, drugs or ETOH use. Denies DV.     NIPS normal.     Visit Vitals  BP 90/60   Pulse 55   Resp (!) 99   Wt 72.1 kg (159 lb)   LMP 2024   BMI 24.90 kg/m²   OB Status Pregnant   Smoking Status Never   BSA 1.83 m²     Pre- Vitals      Flowsheet Row Most Recent Value   Prenatal Assessment    Fetal Heart Rate 147   Fundal Height (cm) 23 cm  [uterine fibroid]   Movement Present   Prenatal Vitals    Blood Pressure 90/60   Weight - Scale 72.1 kg (159 lb)   Urine Albumin/Glucose    Dilation/Effacement/Station    Vaginal Drainage    Edema             Urine neg/neg    Diagnoses and all orders for this visit:    Uterine fibroid complicating  care, baby not yet delivered in first trimester    Encounter for other specified  screening  -     Alpha fetoprotein, maternal; Future      Anatomy scan scheduled for 24.  AFP ordered. To be drawn before 20 weeks.     RTO in 4 weeks for PNV or sooner if needed.

## 2024-09-07 LAB
2ND TRIMESTER 4 SCREEN SERPL-IMP: NORMAL
AFP ADJ MOM SERPL: 1.37
AFP INTERP AMN-IMP: NORMAL
AFP INTERP SERPL-IMP: NORMAL
AFP INTERP SERPL-IMP: NORMAL
AFP SERPL-MCNC: 61 NG/ML
AGE AT DELIVERY: 38.3 YR
GA METHOD: NORMAL
GA: 18.6 WEEKS
IDDM PATIENT QL: NO
MULTIPLE PREGNANCY: NO
NEURAL TUBE DEFECT RISK FETUS: 3920 %

## 2024-09-11 ENCOUNTER — NURSE TRIAGE (OUTPATIENT)
Age: 39
End: 2024-09-11

## 2024-09-11 NOTE — TELEPHONE ENCOUNTER
"Pt called in 19w3d, . Reports that since /Monday she has been experiencing generalized body aches, possible fever, post nasal drip, sore throat. Taking tylenol with some relief. Denies any recent sick contacts. Denies cough, nasal congestion, GI/ symptoms. States she is able to stay hydrated. Pt also reports mild lower abdominal pain that has been constant since . Tylenol helps a little but pain does not completely go away. States she has a large fibroid that is causing discomfort. Denies vaginal bleeding or abnormal discharge. Not feeling much fetal movement yet this pregnancy. Advised pt to continue to monitor and take tylenol as needed. Reviewed safe medications in pregnancy. Pt aware to call back/go to UC if URI symptoms do not improve/worsen. Pt aware to call back with worsening abdominal pain or vaginal bleeding/abnormal discharge.     [facility] sent to On Call Provider Dr. Zaidi- I would recommend urgent care or triage if concerned      Call back to pt and left detailed message of response from Dr. Zaidi. Okay per communication consent form. Pt to call back with any questions.     Reason for Disposition   MILD abdominal pain (e.g., doesn't interfere with normal activities)    Answer Assessment - Initial Assessment Questions  1. LOCATION: \"Where does it hurt?\"       Lower abdomen  2. RADIATION: \"Does the pain shoot anywhere else?\" (e.g., chest, back, shoulder)      back  3. ONSET: \"When did the pain begin?\" (e.g., minutes, hours or days ago)         4. ONSET: \"Gradual or sudden onset?\"      gradual  5. PATTERN \"Does the pain come and go, or has it been constant since it started?\"       constant  6. SEVERITY: \"How bad is the pain?\" \"What does it keep you from doing?\"  (e.g., Scale 1-10; mild, moderate, or severe)    - MILD (1-3): doesn't interfere with normal activities, abdomen soft and not tender to touch     - MODERATE (4-7): interferes with normal activities or awakens from " "sleep, tender to touch     - SEVERE (8-10): excruciating pain, doubled over, unable to do any normal activities      Mild, 2/10  7. RECURRENT SYMPTOM: \"Have you ever had this type of stomach pain before?\" If Yes, ask: \"When was the last time?\" and \"What happened that time?\"       denies  8. CAUSE: \"What do you think is causing the stomach pain?\"      Fibroid?  9. RELIEVING/AGGRAVATING FACTORS: \"What makes it better or worse?\" (e.g., antacids, bowel movement, movement)      Tylenol helps a little  10. OTHER SYMPTOMS: \"Has there been any vaginal bleeding, fever, vomiting, diarrhea, or urine problems?\"        denies  11. HARDIK: \"What date are you expecting to deliver?\"        2/2/25    Protocols used: Pregnancy - Abdominal Pain Less Than 20 Weeks EGA-ADULT-OH    "

## 2024-09-13 ENCOUNTER — ROUTINE PRENATAL (OUTPATIENT)
Facility: HOSPITAL | Age: 39
End: 2024-09-13
Payer: COMMERCIAL

## 2024-09-13 VITALS
HEART RATE: 73 BPM | HEIGHT: 67 IN | SYSTOLIC BLOOD PRESSURE: 108 MMHG | WEIGHT: 159.8 LBS | BODY MASS INDEX: 25.08 KG/M2 | DIASTOLIC BLOOD PRESSURE: 60 MMHG

## 2024-09-13 DIAGNOSIS — O34.12 UTERINE FIBROID COMPLICATING ANTENATAL CARE, BABY NOT YET DELIVERED IN SECOND TRIMESTER: ICD-10-CM

## 2024-09-13 DIAGNOSIS — O09.522 MULTIGRAVIDA OF ADVANCED MATERNAL AGE IN SECOND TRIMESTER: ICD-10-CM

## 2024-09-13 DIAGNOSIS — D25.9 UTERINE FIBROID COMPLICATING ANTENATAL CARE, BABY NOT YET DELIVERED IN SECOND TRIMESTER: ICD-10-CM

## 2024-09-13 DIAGNOSIS — Z3A.19 19 WEEKS GESTATION OF PREGNANCY: Primary | ICD-10-CM

## 2024-09-13 PROCEDURE — 99214 OFFICE O/P EST MOD 30 MIN: CPT | Performed by: OBSTETRICS & GYNECOLOGY

## 2024-09-13 PROCEDURE — 76811 OB US DETAILED SNGL FETUS: CPT | Performed by: OBSTETRICS & GYNECOLOGY

## 2024-09-13 PROCEDURE — 76817 TRANSVAGINAL US OBSTETRIC: CPT | Performed by: OBSTETRICS & GYNECOLOGY

## 2024-09-13 NOTE — PROGRESS NOTES
Ultrasound Probe Disinfection    A transvaginal ultrasound was performed.   Prior to use, disinfection was performed with High Level Disinfection Process (Gigaclearon).  Probe serial number [id number]: [id number] was used.    Pauline Fry  09/13/24  10:29 AM

## 2024-09-13 NOTE — LETTER
September 13, 2024     [facility]  [address]  [address]  [place] [address]    Patient: Nicole Forte   YOB: 1985   Date of Visit: 9/13/2024       Dear Ms. Wiley:    Thank you for referring Nicole Forte to me for evaluation. Below are my notes for this consultation.    If you have questions, please do not hesitate to call me. I look forward to following your patient along with you.         Sincerely,        Favian Waldron MD        CC: No Recipients    Favian Waldron MD  9/13/2024 12:40 PM  Sign when Signing Visit  A fetal ultrasound was completed. See Ob procedures in Epic for an interpretation and recommendations. Do not hesitate to contact us in [facility] if you have questions.    Favian Waldron MD, MSCE  Maternal Fetal Medicine

## 2024-09-13 NOTE — PROGRESS NOTES
A fetal ultrasound was completed. See Ob procedures in Epic for an interpretation and recommendations. Do not hesitate to contact us in [facility] if you have questions.    Favian Waldron MD, MSCE  Maternal Fetal Medicine

## 2024-10-03 ENCOUNTER — ROUTINE PRENATAL (OUTPATIENT)
Dept: OBGYN CLINIC | Facility: CLINIC | Age: 39
End: 2024-10-03

## 2024-10-03 VITALS — BODY MASS INDEX: 25.06 KG/M2 | DIASTOLIC BLOOD PRESSURE: 70 MMHG | SYSTOLIC BLOOD PRESSURE: 100 MMHG | WEIGHT: 160 LBS

## 2024-10-03 DIAGNOSIS — O09.522 MULTIGRAVIDA OF ADVANCED MATERNAL AGE IN SECOND TRIMESTER: ICD-10-CM

## 2024-10-03 DIAGNOSIS — Z3A.22 22 WEEKS GESTATION OF PREGNANCY: Primary | ICD-10-CM

## 2024-10-03 DIAGNOSIS — O34.12 UTERINE FIBROID COMPLICATING ANTENATAL CARE, BABY NOT YET DELIVERED, SECOND TRIMESTER: ICD-10-CM

## 2024-10-03 DIAGNOSIS — D25.9 UTERINE FIBROID COMPLICATING ANTENATAL CARE, BABY NOT YET DELIVERED, SECOND TRIMESTER: ICD-10-CM

## 2024-10-03 PROCEDURE — PNV: Performed by: STUDENT IN AN ORGANIZED HEALTH CARE EDUCATION/TRAINING PROGRAM

## 2024-10-03 RX ORDER — AMOXICILLIN 500 MG
1 CAPSULE ORAL DAILY
COMMUNITY

## 2024-10-03 NOTE — PROGRESS NOTES
Nicole is a 39 y.o.  22w4d. Reports ++FM, no LOF, VB, or regular contractions.     Typo in 24 report, note says 180/60, documented vital is 108/60    Vitals:    10/03/24 1015   BP: 100/70     Fundal fibroid up to 28 weeks  Fundus lateral to fibroid 23  +FHTs      Assessment & Plan  22 weeks gestation of pregnancy  First tri labs completed  Rh status POS  24 level ii: notable for right anterior fundal fibroid, 11.8 x 9.1 x 12.4cm   Growth scheduled 24  Discussed pre-term labor precautions  Return to office in 4 weeks         Multigravida of advanced maternal age in second trimester         Uterine fibroid complicating  care, baby not yet delivered, second trimester

## 2024-10-03 NOTE — ASSESSMENT & PLAN NOTE
First tri labs completed  Rh status POS  9/13/24 level ii: notable for right anterior fundal fibroid, 11.8 x 9.1 x 12.4cm   Growth scheduled 11/08/24  Discussed pre-term labor precautions  Return to office in 4 weeks

## 2024-10-29 ENCOUNTER — ROUTINE PRENATAL (OUTPATIENT)
Dept: OBGYN CLINIC | Facility: CLINIC | Age: 39
End: 2024-10-29

## 2024-10-29 VITALS — WEIGHT: 162 LBS | SYSTOLIC BLOOD PRESSURE: 100 MMHG | BODY MASS INDEX: 25.37 KG/M2 | DIASTOLIC BLOOD PRESSURE: 70 MMHG

## 2024-10-29 DIAGNOSIS — Z3A.26 26 WEEKS GESTATION OF PREGNANCY: ICD-10-CM

## 2024-10-29 DIAGNOSIS — D25.9 UTERINE FIBROID COMPLICATING ANTENATAL CARE, BABY NOT YET DELIVERED IN SECOND TRIMESTER: Primary | ICD-10-CM

## 2024-10-29 DIAGNOSIS — O09.522 MULTIGRAVIDA OF ADVANCED MATERNAL AGE IN SECOND TRIMESTER: ICD-10-CM

## 2024-10-29 DIAGNOSIS — O34.12 UTERINE FIBROID COMPLICATING ANTENATAL CARE, BABY NOT YET DELIVERED IN SECOND TRIMESTER: Primary | ICD-10-CM

## 2024-10-29 PROCEDURE — PNV: Performed by: OBSTETRICS & GYNECOLOGY

## 2024-10-29 NOTE — PROGRESS NOTES
OB/GYN  PN Visit  Nicole Forte  [id number]  10/29/2024  2:01 PM  Dr. Tata Iniguez MD    S: 39 y.o.  26w2d here for PN visit. She denies contractions. She denies leakage of fluid and vaginal bleeding. She reports good fetal movement. She denies nausea, vomiting, cramping, edema, domestic violence, and smoking. She reports headaches. Her pregnancy is complicated by AMA and fibroid uterus.       O:  Pre- Vitals      Flowsheet Row Most Recent Value   Prenatal Assessment    Fetal Heart Rate 152   Fundal Height (cm) 26 cm  [33cm w/ fibroid]   Movement Present   Prenatal Vitals    Blood Pressure 100/70   Weight - Scale 73.5 kg (162 lb)   Urine Albumin/Glucose    Dilation/Effacement/Station    Vaginal Drainage    Draining Fluid No   Edema    LLE Edema None   RLE Edema None          Physical Exam  Vitals reviewed.   Constitutional:       General: She is not in acute distress.     Appearance: Normal appearance. She is well-developed. She is not ill-appearing, toxic-appearing or diaphoretic.   Cardiovascular:      Rate and Rhythm: Normal rate.   Pulmonary:      Effort: Pulmonary effort is normal. No respiratory distress.   Abdominal:      General: There is no distension.      Palpations: Abdomen is soft. There is no mass.      Tenderness: There is no abdominal tenderness. There is no guarding or rebound.   Genitourinary:     Comments: Gravid, nontender  Skin:     General: Skin is warm and dry.   Neurological:      Mental Status: She is alert and oriented to person, place, and time.   Psychiatric:         Mood and Affect: Mood normal.         Behavior: Behavior normal.         A/P:    Assessment & Plan  26 weeks gestation of pregnancy  - Continue PNV  - Labs: UTD; 3rd trimester labs ordered  - Genetics: NIPT neg; MSAFP wnl  - Ultrasounds: level II US WNL on 24; Next US scheduled for 24  - Tdap: Will offer after 27 weeks gestation  - Flu Shot: Declined  - RSV: Will offer during season (-) @  [id number]   - COVID: Unvaccinated   - Rhogam: N/A  - Delivery:  with epidural  - Contraception: Undecided  - Breastfeeding: Yes; Pump declined  - Pediatrician: [facility]   - RTO in 2 weeks    Orders:    CBC and Platelet; Future    RPR-Syphilis Screening (Total Syphilis IGG/IGM); Future    Glucose, 1H PG; Future    Multigravida of advanced maternal age in second trimester  Recommend APFS         Uterine fibroid complicating  care, baby not yet delivered in second trimester  Intramural myometrium fibroid located in the right anterior fundus region, measuring 11.8 x 9.1 x 12.4cm with a volume of 696.4cc. Color doppler flow was not increased. The appearance was heterogeneous.  Reviewed possibility of FGR, malposition, fibroid enlargement vs. necrosis etc. Will continue to monitor with US.            Future Appointments   Date Time Provider Department Center   2024  9:30 AM  US 2 [facility]   2024  1:00 PM [facility]   2024  8:45 AM Vivienne Lino MD [facility]   2024  9:00 AM Tata Iniguez MD [facility]   2024 10:45 AM [facility]         Tata Iniguez MD  10/29/2024  2:01 PM

## 2024-10-29 NOTE — ASSESSMENT & PLAN NOTE
Intramural myometrium fibroid located in the right anterior fundus region, measuring 11.8 x 9.1 x 12.4cm with a volume of 696.4cc. Color doppler flow was not increased. The appearance was heterogeneous.  Reviewed possibility of FGR, malposition, fibroid enlargement vs. necrosis etc. Will continue to monitor with US.

## 2024-11-08 ENCOUNTER — ULTRASOUND (OUTPATIENT)
Facility: HOSPITAL | Age: 39
End: 2024-11-08
Payer: COMMERCIAL

## 2024-11-08 ENCOUNTER — APPOINTMENT (OUTPATIENT)
Dept: LAB | Facility: AMBULARY SURGERY CENTER | Age: 39
End: 2024-11-08
Payer: COMMERCIAL

## 2024-11-08 VITALS
BODY MASS INDEX: 25.81 KG/M2 | WEIGHT: 164.46 LBS | HEIGHT: 67 IN | HEART RATE: 49 BPM | DIASTOLIC BLOOD PRESSURE: 58 MMHG | OXYGEN SATURATION: 98 % | SYSTOLIC BLOOD PRESSURE: 108 MMHG

## 2024-11-08 DIAGNOSIS — Z3A.26 26 WEEKS GESTATION OF PREGNANCY: ICD-10-CM

## 2024-11-08 DIAGNOSIS — D25.9 UTERINE FIBROID COMPLICATING ANTENATAL CARE, BABY NOT YET DELIVERED IN SECOND TRIMESTER: ICD-10-CM

## 2024-11-08 DIAGNOSIS — O34.12 UTERINE FIBROID COMPLICATING ANTENATAL CARE, BABY NOT YET DELIVERED IN SECOND TRIMESTER: ICD-10-CM

## 2024-11-08 DIAGNOSIS — Z3A.27 27 WEEKS GESTATION OF PREGNANCY: Primary | ICD-10-CM

## 2024-11-08 DIAGNOSIS — O09.522 MULTIGRAVIDA OF ADVANCED MATERNAL AGE IN SECOND TRIMESTER: ICD-10-CM

## 2024-11-08 LAB
ERYTHROCYTE [DISTWIDTH] IN BLOOD BY AUTOMATED COUNT: 13.2 % (ref 11.6–15.1)
GLUCOSE 1H P 50 G GLC PO SERPL-MCNC: 103 MG/DL (ref 70–134)
HCT VFR BLD AUTO: 35.6 % (ref 34.8–46.1)
HGB BLD-MCNC: 11.6 G/DL (ref 11.5–15.4)
MCH RBC QN AUTO: 31.3 PG (ref 26.8–34.3)
MCHC RBC AUTO-ENTMCNC: 32.6 G/DL (ref 31.4–37.4)
MCV RBC AUTO: 96 FL (ref 82–98)
PLATELET # BLD AUTO: 213 THOUSANDS/UL (ref 149–390)
PMV BLD AUTO: 10.5 FL (ref 8.9–12.7)
RBC # BLD AUTO: 3.71 MILLION/UL (ref 3.81–5.12)
TREPONEMA PALLIDUM IGG+IGM AB [PRESENCE] IN SERUM OR PLASMA BY IMMUNOASSAY: NORMAL
WBC # BLD AUTO: 6.97 THOUSAND/UL (ref 4.31–10.16)

## 2024-11-08 PROCEDURE — 76816 OB US FOLLOW-UP PER FETUS: CPT | Performed by: OBSTETRICS & GYNECOLOGY

## 2024-11-08 PROCEDURE — 82950 GLUCOSE TEST: CPT

## 2024-11-08 PROCEDURE — 99213 OFFICE O/P EST LOW 20 MIN: CPT | Performed by: OBSTETRICS & GYNECOLOGY

## 2024-11-08 PROCEDURE — 36415 COLL VENOUS BLD VENIPUNCTURE: CPT

## 2024-11-08 PROCEDURE — 85027 COMPLETE CBC AUTOMATED: CPT

## 2024-11-08 PROCEDURE — 86780 TREPONEMA PALLIDUM: CPT

## 2024-11-08 NOTE — PROGRESS NOTES
The patient was seen today for an ultrasound.  Please see ultrasound report (located under Ob Procedures) for additional details.   Thank you very much for allowing us to participate in the care of this very nice patient.  Should you have any questions, please do not hesitate to contact me.     Spencer Mcneill MD FACOG  Attending Physician, Maternal-Fetal Medicine  [facility]

## 2024-11-12 ENCOUNTER — TELEPHONE (OUTPATIENT)
Age: 39
End: 2024-11-12

## 2024-11-12 NOTE — TELEPHONE ENCOUNTER
Pt called to resched appt for today due to being sick with a fever. There are no appts until December with providers at [place]. Pt also stated she was to resched appt for 11/26/24 per a message received. Pt asked to please call her back with sooner available appts for both visits. Pt states she has a fever and is not feeling well and does not want to come in and get anyone else sick.

## 2024-11-18 ENCOUNTER — TELEPHONE (OUTPATIENT)
Dept: OBGYN CLINIC | Facility: CLINIC | Age: 39
End: 2024-11-18

## 2024-11-18 NOTE — TELEPHONE ENCOUNTER
Called pt regarding her missed appt today and pt said she cancelled on the text message she got but was not cancelled in Epic.  Pt said she was cancelling because they are sick in her house and she will make it to her next appt on Tuesday.

## 2024-11-25 ENCOUNTER — TELEPHONE (OUTPATIENT)
Age: 39
End: 2024-11-25

## 2024-12-02 ENCOUNTER — PATIENT MESSAGE (OUTPATIENT)
Dept: OBGYN CLINIC | Facility: CLINIC | Age: 39
End: 2024-12-02

## 2024-12-05 ENCOUNTER — NURSE TRIAGE (OUTPATIENT)
Dept: OTHER | Facility: OTHER | Age: 39
End: 2024-12-05

## 2024-12-05 NOTE — TELEPHONE ENCOUNTER
"Regardin wks pregnant / thyroid pain  ----- Message from Samanta PAZ sent at 2024  6:07 PM EST -----  \"I'm 31 weeks pregnant and I have a thyroid and it's starting to get painful but I wanted to know what would be to much pain for me to go to the hospital\"    *pt clarified fibroid, not thyroid  "

## 2024-12-05 NOTE — TELEPHONE ENCOUNTER
"She reports the last few days she has had LUQ pain which she attributes to the fibroid though she has never had any pain associated with it before. Worse with exertion (jazmyn exercise, running), better with rest. Possibly decreased fetal movement but she has not done kick count and has found this pregnancy that baby's movements have been quite faint the whole time. She does not think she's gotten 10 movements in the last 2 hours but has not really been paying much attention. No LOF. No bleeding. No other symptoms.     Per on call should come to L&D for triage. Dr Iniguez notified Bernardo charge RN. Relayed to patient who verbalized understanding. 911 precautions given.     Reason for Disposition   MODERATE-SEVERE abdominal pain (e.g., interferes with normal activities, awakens from sleep)    Answer Assessment - Initial Assessment Questions  1. LOCATION: \"Where does it hurt?\"       LUQ attributes to fibroid    2. RADIATION: \"Does the pain shoot anywhere else?\" (e.g., chest, back)      No radiation    3. ONSET: \"When did the pain begin?\" (Minutes, hours or days ago)       Was never painful until this week        4. SUDDEN: \"Gradual or sudden onset?\"      gradual    5. PATTERN: \"Does the pain come and go, or has it been constant since it started?\"       Constant but waxes and wanes    6. SEVERITY: \"How bad is the pain?\" \"What does it keep you from doing?\"  (e.g., Scale 1-10; mild, moderate, or severe)      Worse with activity    7. RECURRENT SYMPTOM: \"Have you ever had this type of stomach pain before?\" If Yes, ask: \"When was the last time?\" and \"What happened that time?\"       not    8. CAUSE: \"What do you think is causing the stomach pain?      fibroid    9. RELIEVING/AGGRAVATING FACTORS: \"What makes it better or worse?\" (e.g., antacids, bowel movement, movement)      Better with rest    10. FETAL MOVEMENT: \"Has the baby's movement decreased or changed significantly from normal?\"        Compared to prior pregnancy " "more faint, maybe a little decreased    11. OTHER SYMPTOMS: \"Do you have any other symptoms?\" (e.g., back pain, contractions, diarrhea, fever, headache, urination pain, vaginal bleeding, vaginal discharge, vomiting)        Javier hernández faint        No LOF       No bleeding    12. HARDIK: \"What date are you expecting to deliver?\"        2/2/2025    Protocols used: Pregnancy - Abdominal Pain Greater Than 20 Weeks EGA-Adult-AH    "

## 2024-12-08 ENCOUNTER — NURSE TRIAGE (OUTPATIENT)
Dept: OTHER | Facility: OTHER | Age: 39
End: 2024-12-08

## 2024-12-09 NOTE — TELEPHONE ENCOUNTER
"Regardin weeks pregnant/ loss of mucus plug  ----- Message from Saira HOUSTON sent at 2024  8:03 PM EST -----  Pt stated, \" I lost my mucus plug last night and I would like to speak with a nurse. I am not experiencing any symptoms.\"    "

## 2024-12-09 NOTE — TELEPHONE ENCOUNTER
"Reason for Disposition  • Normal vaginal discharge in pregnancy    Answer Assessment - Initial Assessment Questions  1. DISCHARGE: \"Describe the discharge.\" (e.g., white, yellow, green, gray, foamy, cottage cheese-like)      Clear discharge noticed yesterday.     2. ODOR: \"Is there a bad odor?\"      No    3. ONSET: \"When did the discharge begin?\"      Yesterday    4. RASH: \"Is there a rash in that area?\" If Yes, ask: \"Describe it.\" (e.g., redness, blisters, sores, bumps)      No    5. ABDOMEN PAIN: \"Are you having any abdomen pain?\" If Yes, ask: \"What does it feel like?\" (e.g., crampy, dull, intermittent, constant)       Light crampy sensation on and off- says this has been baseline. No pain currently.    6. ABDOMEN PAIN SEVERITY: If present, ask: \"How bad is it?\"  (e.g., Scale 1-10; mild, moderate, or severe)      N/a    7. CAUSE: \"What do you think is causing the discharge?\"      Questioning mucus plug?    8. OTHER SYMPTOMS: \"Do you have any other symptoms?\" (e.g., fever, itching, vaginal bleeding, pain with urination)      No cont leakage of vaginal fluid. No bleeding. Moving normally in the last hr.     9. HARDIK: \"What date are you expecting to deliver?\"       2/2/2025    10. PREGNANCY: \"How many weeks pregnant are you?\"        32 wks gest    Protocols used: Pregnancy - Vaginal Discharge-Adult-AH    "

## 2024-12-11 NOTE — PROGRESS NOTES
OB/GYN  PN Visit  Nicole Forte  [id number]  2024  11:35 AM  Dr. Tata Iniguez MD    S: 39 y.o.  32w4d here for PN visit. She denies contractions. She denies leakage of fluid and vaginal bleeding. She reports good fetal movement. She denies nausea, vomiting, headache, cramping, edema, domestic violence, and smoking.  Her pregnancy is complicated by AMA and fibroid uterus.         O:  Pre-Thuy Vitals      Flowsheet Row Most Recent Value   Prenatal Assessment    Fetal Heart Rate 130   Fundal Height (cm) 30 cm  [Measuring slightly lower than expected today. Uterine fibroid on patient's left side (not measured),  Fundal height likely distorted due to fibroid,  Patient has growth scan scheduled]   Movement Present   Prenatal Vitals    Blood Pressure 100/60   Weight - Scale 75.5 kg (166 lb 6.4 oz)   Urine Albumin/Glucose    Dilation/Effacement/Station    Vaginal Drainage    Draining Fluid No   Edema    LLE Edema None   RLE Edema None          Physical Exam  Vitals reviewed.   Constitutional:       General: She is not in acute distress.     Appearance: Normal appearance. She is well-developed. She is not ill-appearing, toxic-appearing or diaphoretic.   Cardiovascular:      Rate and Rhythm: Normal rate.   Pulmonary:      Effort: Pulmonary effort is normal. No respiratory distress.   Abdominal:      General: There is no distension.      Palpations: Abdomen is soft. There is no mass.      Tenderness: There is no abdominal tenderness. There is no guarding or rebound.   Genitourinary:     Comments: Gravid, nontender  Skin:     General: Skin is warm and dry.   Neurological:      Mental Status: She is alert and oriented to person, place, and time.   Psychiatric:         Mood and Affect: Mood normal.         Behavior: Behavior normal.         A/P:  Assessment & Plan  32 weeks gestation of pregnancy  - Continue PNV  - Labor precautions reviewed  - Fetal kick counts reviewed  - Labs: UTD  - Genetics: NIPT neg;  MSAFP wnl  - Ultrasounds: level II US WNL on 24; Most recent US wnl on 24 (stable fibroid with EFW 48%); Next US scheduled for 24  - Tdap: Declined  - Flu Shot: Declined  - RSV: Declined  - COVID: Unvaccinated   - Rhogam: N/A  - Delivery:  with epidural  - Contraception: Undecided; Possibly sterilization?   - Breastfeeding: Yes; Pump declined  - Pediatrician: [facility]   - RTO in 2 weeks       Multigravida of advanced maternal age in second trimester  Recommend APFS        Uterine fibroid complicating  care, baby not yet delivered in second trimester  US 24: Subserosal myometrium fibroid located in the anterior fundus region, measuring 12.5 x 9.2 x 13.0cm with a volume of 781.0cc. Color doppler flow was not increased. The appearance was heterogeneous. (Slight increase in size from prior measurements)  Reviewed possibility of FGR, malposition, fibroid enlargement vs. necrosis etc. Will continue to monitor with US.        Request for sterilization  Discussed with pt LARC methods of contraception including intrauterine device, Nexplanon, and Depo Provera in addition to surgical sterilization. Discussed the risks, benefits, and alternatives to each. Discussed that the Nexplanon has been proven to be the most effective form of contraception; pt desires permanence. Discussed that Mirena IUD has excellent bleeding profile that she would be unable to achieve with surgical sterilization alone; pt is uninterested in bleeding profile. Discussed that surgical sterilization is a PERMANENT and IRREVERSIBLE surgical procedure; pt demonstrated understanding and continued desire to proceed. Patient is okay if she does not have any more children, even if she dates a different partner or loses her children. She is aware that if she were to need a C/S, we could proceed with sterilization at that time (pending safety). Otherwise, will plan for outpatient laparoscopic procedure >6 weeks postpartum.               Future Appointments   Date Time Provider Department Center   2024 10:45 AM [facility]   2024 11:30 AM   3 [facility]   2025  8:00 AM Jenniffer Escobar PA-C [facility]   2025 10:15 AM Kimberly Zaidi MD [facility]   2025  9:45 AM Vivienne Lino MD [facility]   2025 11:45 AM Aislinn Eldridge MD [facility]         Tata Iniguez MD  2024  11:35 AM

## 2024-12-11 NOTE — ASSESSMENT & PLAN NOTE
US 11/8/24: Subserosal myometrium fibroid located in the anterior fundus region, measuring 12.5 x 9.2 x 13.0cm with a volume of 781.0cc. Color doppler flow was not increased. The appearance was heterogeneous. (Slight increase in size from prior measurements)  Reviewed possibility of FGR, malposition, fibroid enlargement vs. necrosis etc. Will continue to monitor with US.

## 2024-12-12 ENCOUNTER — ROUTINE PRENATAL (OUTPATIENT)
Dept: OBGYN CLINIC | Facility: CLINIC | Age: 39
End: 2024-12-12

## 2024-12-12 VITALS — SYSTOLIC BLOOD PRESSURE: 100 MMHG | WEIGHT: 166.4 LBS | BODY MASS INDEX: 26.06 KG/M2 | DIASTOLIC BLOOD PRESSURE: 60 MMHG

## 2024-12-12 DIAGNOSIS — O09.522 MULTIGRAVIDA OF ADVANCED MATERNAL AGE IN SECOND TRIMESTER: ICD-10-CM

## 2024-12-12 DIAGNOSIS — O34.12 UTERINE FIBROID COMPLICATING ANTENATAL CARE, BABY NOT YET DELIVERED IN SECOND TRIMESTER: Primary | ICD-10-CM

## 2024-12-12 DIAGNOSIS — D25.9 UTERINE FIBROID COMPLICATING ANTENATAL CARE, BABY NOT YET DELIVERED IN SECOND TRIMESTER: Primary | ICD-10-CM

## 2024-12-12 DIAGNOSIS — Z30.2 REQUEST FOR STERILIZATION: ICD-10-CM

## 2024-12-12 DIAGNOSIS — Z3A.32 32 WEEKS GESTATION OF PREGNANCY: ICD-10-CM

## 2024-12-12 PROCEDURE — PNV: Performed by: OBSTETRICS & GYNECOLOGY

## 2024-12-23 ENCOUNTER — ROUTINE PRENATAL (OUTPATIENT)
Dept: OBGYN CLINIC | Facility: CLINIC | Age: 39
End: 2024-12-23

## 2024-12-23 VITALS — DIASTOLIC BLOOD PRESSURE: 60 MMHG | SYSTOLIC BLOOD PRESSURE: 120 MMHG | WEIGHT: 169 LBS | BODY MASS INDEX: 26.47 KG/M2

## 2024-12-23 DIAGNOSIS — D25.9 UTERINE FIBROID COMPLICATING ANTENATAL CARE, BABY NOT YET DELIVERED IN THIRD TRIMESTER: ICD-10-CM

## 2024-12-23 DIAGNOSIS — O34.13 UTERINE FIBROID COMPLICATING ANTENATAL CARE, BABY NOT YET DELIVERED IN THIRD TRIMESTER: ICD-10-CM

## 2024-12-23 DIAGNOSIS — Z34.83 PRENATAL CARE, SUBSEQUENT PREGNANCY, THIRD TRIMESTER: Primary | ICD-10-CM

## 2024-12-23 DIAGNOSIS — O09.523 MULTIGRAVIDA OF ADVANCED MATERNAL AGE IN THIRD TRIMESTER: ICD-10-CM

## 2024-12-23 PROCEDURE — PNV: Performed by: NURSE PRACTITIONER

## 2024-12-23 NOTE — PROGRESS NOTES
Nicole Forte is a 39 y.o.  at 34w1d. Reports +FM  She is doing well. Denies LOF/Bleeding/Cramping. Denies n/v/Ha, edema. Denies tobacco, drugs or ETOH use. Denies DV.     Visit Vitals  /60   Wt 76.7 kg (169 lb)   LMP 2024   BMI 26.47 kg/m²   OB Status Pregnant   Smoking Status Never   BSA 1.88 m²     Pre-Thuy Vitals      Flowsheet Row Most Recent Value   Prenatal Assessment    Fetal Heart Rate 135   Fundal Height (cm) 35 cm   Movement Present   Prenatal Vitals    Blood Pressure 120/60   Weight - Scale 76.7 kg (169 lb)   Urine Albumin/Glucose    Dilation/Effacement/Station    Vaginal Drainage    Edema             Urine neg/neg    Diagnoses and all orders for this visit:    Prenatal care, subsequent pregnancy, third trimester    Uterine fibroid complicating  care, baby not yet delivered in third trimester    Multigravida of advanced maternal age in third trimester      Scheduled for growth scan on Friday.    RTO in 2 weeks for PNV or sooner if needed.

## 2024-12-27 ENCOUNTER — TELEPHONE (OUTPATIENT)
Age: 39
End: 2024-12-27

## 2024-12-27 ENCOUNTER — ULTRASOUND (OUTPATIENT)
Dept: PERINATAL CARE | Facility: CLINIC | Age: 39
End: 2024-12-27
Payer: COMMERCIAL

## 2024-12-27 VITALS
BODY MASS INDEX: 26.46 KG/M2 | HEART RATE: 83 BPM | OXYGEN SATURATION: 99 % | HEIGHT: 67 IN | WEIGHT: 168.6 LBS | DIASTOLIC BLOOD PRESSURE: 70 MMHG | SYSTOLIC BLOOD PRESSURE: 112 MMHG

## 2024-12-27 DIAGNOSIS — D25.9 UTERINE FIBROID COMPLICATING ANTENATAL CARE, BABY NOT YET DELIVERED IN SECOND TRIMESTER: Primary | ICD-10-CM

## 2024-12-27 DIAGNOSIS — O09.522 MULTIGRAVIDA OF ADVANCED MATERNAL AGE IN SECOND TRIMESTER: ICD-10-CM

## 2024-12-27 DIAGNOSIS — Z3A.34 34 WEEKS GESTATION OF PREGNANCY: ICD-10-CM

## 2024-12-27 DIAGNOSIS — O36.5990 FETAL GROWTH RESTRICTION ANTEPARTUM: ICD-10-CM

## 2024-12-27 DIAGNOSIS — O34.12 UTERINE FIBROID COMPLICATING ANTENATAL CARE, BABY NOT YET DELIVERED IN SECOND TRIMESTER: Primary | ICD-10-CM

## 2024-12-27 PROCEDURE — 59025 FETAL NON-STRESS TEST: CPT | Performed by: OBSTETRICS & GYNECOLOGY

## 2024-12-27 PROCEDURE — 99214 OFFICE O/P EST MOD 30 MIN: CPT | Performed by: OBSTETRICS & GYNECOLOGY

## 2024-12-27 PROCEDURE — 76816 OB US FOLLOW-UP PER FETUS: CPT | Performed by: OBSTETRICS & GYNECOLOGY

## 2024-12-27 PROCEDURE — 76820 UMBILICAL ARTERY ECHO: CPT | Performed by: OBSTETRICS & GYNECOLOGY

## 2024-12-27 NOTE — PROGRESS NOTES
"[facility]: Nicole Forte was seen today for NST (found under the pregnancy episode) which I reviewed the RN assessment and agree, and fetal growth ultrasound (see ultrasound report under OB procedures tab).  See ultrasound report under \"OB Procedures\" tab.   The time spent on this established patient on the encounter date included 6 minutes previsit service time reviewing records and precharting, 12 minutes face-to-face service time counseling regarding results and coordinating care, and  10 minutes charting, totalling 28 minutes.  Please don't hesitate to contact our office with any concerns or questions.  -Hallie Hahn MD      "

## 2024-12-27 NOTE — PATIENT INSTRUCTIONS
Nonstress Test for Pregnancy   WHAT YOU NEED TO KNOW:   What do I need to know about a nonstress test?  A nonstress test measures your baby's heart rate and movements. Nonstress means that no stress will be placed on your baby during the test.  How do I prepare for a nonstress test?  Your healthcare provider will talk to you about how to prepare for this test. Your provider may tell you to eat and drink plenty of liquids before your test. If you smoke, you may be asked not to smoke within 2 hours before the test. Your provider will also tell you which medicines to take or not take on the day of your test.  What will happen during a nonstress test?  You may be asked to lie down or recline back for the test on a bed. One or 2 belts with sensors will be placed around your abdomen. Your baby's heart rate will be recorded with a machine. If your baby does not move, your baby may be asleep. Your healthcare provider may make a noise near your abdomen to try to wake your baby. The test usually takes about 20 minutes, but can take longer if your baby needs to be awakened.        What do I need to know about the test results?  Your baby will be expected to move at least 2 times for a certain amount of time. Your baby's heart rate will be expected to go up by a certain number of beats per minute during movement. If your baby does not move as expected, the test may need to be repeated or you may need other tests.  CARE AGREEMENT:   You have the right to help plan your care. Learn about your health condition and how it may be treated. Discuss treatment options with your healthcare providers to decide what care you want to receive. You always have the right to refuse treatment. The above information is an  only. It is not intended as medical advice for individual conditions or treatments. Talk to your doctor, nurse or pharmacist before following any medical regimen to see if it is safe and effective for you.  ©  Copyright [employer]  Information is for End User's use only and may not be sold, redistributed or otherwise used for commercial purposes. Thank you for choosing us for your  care today.  If you have any questions about your ultrasound or care, please do not hesitate to contact us or your primary obstetrician.        Some general instructions for your pregnancy are:    Exercise: Aim for 150 minutes per week of regular exercise.  Walking is great!  Nutrition: Choose healthy sources of calcium, iron, and protein.  Avoid ultraprocessed foods and added sugar.  Learn about Preeclampsia: preeclampsia is a common, potentially serious high blood pressure complication in pregnancy.  A blood pressure of 140mmHg (systolic or top number) or 90mmHg (diastolic or bottom number) should be evaluated by your doctor.  Aspirin is sometimes prescribed in early pregnancy to prevent preeclampsia in women with risk factors - ask your obstetrician if you should be on this medication.  For more resources, visit:  https://www.highriskpregnancyinfo.org/preeclampsia  If you smoke, please try to quit completely but also try to reduce your smoking by as much as possible (as soon as possible).  Do not vape.  Please also avoid cannabis products.  Other warning signs to watch out for in pregnancy or postpartum: chest pain, obstructed breathing or shortness of breath, seizures, thoughts of hurting yourself or your baby, bleeding, a painful or swollen leg, fever, or headache (see AWHONN POST-BIRTH Warning Signs campaign).  If these happen call 911.  Itching is also not normal in pregnancy and if you experience this, especially over your hands and feet, potentially worse at night, notify your doctors.

## 2024-12-27 NOTE — TELEPHONE ENCOUNTER
Pt called stating she was just seen at [facility] and was advised to have induction at 37 wks due to a fibroid imposing on the baby's growth. She is currently 34 wk, 5 day pregnant. Pt is requesting a call back to further discuss. Thank you.

## 2024-12-27 NOTE — PROGRESS NOTES
Non-Stress Testing:    Non-Stress test, equipment, procedure, and expected outcomes explained. Reviewed fetal kick counts and when to call OB.Verified patient understanding of fetal kick counts with teach back method. Patient reports feeling daily fetal movements. Patient has no questions or concerns.     Reviewed non-stress test with Dr. Hahn.

## 2024-12-30 ENCOUNTER — PATIENT MESSAGE (OUTPATIENT)
Dept: OBGYN CLINIC | Facility: CLINIC | Age: 39
End: 2024-12-30

## 2024-12-30 ENCOUNTER — NURSE TRIAGE (OUTPATIENT)
Age: 39
End: 2024-12-30

## 2024-12-30 NOTE — TELEPHONE ENCOUNTER
"35w1d Nicole called reporting she noted pink thick mucousy clump of discharge x 2 today.  Has been having intermittent mil low pelvic discomfort, denies abdomen hardening/tightening or worsening pelvic discomfort.     Scheduled for csection on 1/13/2025 due to large fibroid. Baby is not as active per patient due to size of fibroid.  Does not notice any change in FM today. Denies LOF, vaginal bleeding.     Nicole concerned may be losing mucous plug.  Reassured can lose mucous plug up to several weeks prior to delivery.      Encouraged continued close monitoring. Report vaginal bleeding, LOF, decreased fetal movement or increased pelvic or abdominal pain.    Has  NST scheduled tomorrow 8am and again on 1/2 with ultrasound in office on 1/6.      ESC to Dr. Zaidi:  Please advise if you have any additional recommendations at this time.     ESC reply:   Agree- any further concerns she can be seen in triage and please encourage fetal kick counts. Thank you    Contacted Nicole, reviewed DR. Zaidi recommendation. Nicole in agreement to plan.     Answer Assessment - Initial Assessment Questions  1. DISCHARGE: \"Describe the discharge.\" (e.g., white, yellow, green, gray, foamy, cottage cheese-like)      Pink thick mucousy type discharge  2. ODOR: \"Is there a bad odor?\"      denies  3. ONSET: \"When did the discharge begin?\"      Today x 2  4. RASH: \"Is there a rash in that area?\" If Yes, ask: \"Describe it.\" (e.g., redness, blisters, sores, bumps)      N/a  5. ABDOMEN PAIN: \"Are you having any abdomen pain?\" If Yes, ask: \"What does it feel like?\" (e.g., crampy, dull, intermittent, constant)       Intermittent pelvic discomfort, not increasing or persistent  6. ABDOMEN PAIN SEVERITY: If present, ask: \"How bad is it?\"  (e.g., Scale 1-10; mild, moderate, or severe)      Mild   7. CAUSE: \"What do you think is causing the discharge?\"      Mucous plug  8. OTHER SYMPTOMS: \"Do you have any other symptoms?\" (e.g., fever, itching, " "vaginal bleeding, pain with urination)      denies  9. HARDIK: \"What date are you expecting to deliver?\"       02/02/2025 scheduled c section 1/13/2025 due to large fibroid  10. PREGNANCY: \"How many weeks pregnant are you?\"        35w1d    Protocols used: Pregnancy - Vaginal Discharge-Adult-[place]    "

## 2024-12-30 NOTE — TELEPHONE ENCOUNTER
Placed call to patient, reviewed scheduled LTCS details and instructions. Pt verbalized understanding, aware to maintain appointments in office as scheduled until scheduled delivery date, aware will receive call from hospital prior to confirm arrival time and review pre procedure instructions. Patient verbalized understanding to call office if any questions or concerns, and she had no additional questions at this time.

## 2024-12-30 NOTE — TELEPHONE ENCOUNTER
Reviewed with on call provider - available appt 25, OK to offer scheduling to patient for review with provider. Provider requested to schedule as ultrasound for position check. On call provider reviewed delivery recommendations at this time and verified OK to proceed with scheduling LTCS at 37 weeks for FGR, uterine fibroid and breech presentation at this time. Provider to further review with patient at appt in office.  Placed call to patient, reviewed and patient confirmed able to accommodate appt 25 as offered and scheduled. Reviewed scheduling  at 37 weeks, pt has no preferences, 25 (37w1d) if available, if not soonest available for scheduling. Aware can modify delivery scheduling as needed based on recommendations/review with provider at upcoming appts. Patient will maintain NSTs/SILVINO as recommended and scheduled w/MFM. Patient had no questions at this time, aware will call to confirm  scheduling once details confirmed with L&D.

## 2024-12-30 NOTE — TELEPHONE ENCOUNTER
Placed call to patient- patient is scheduled for twice weekly NSTs with M and confirmed this scheduling works with her schedule and declined NST scheduling in [facility] office at this time.  Offered available appt with MD 1/2/25 at 1300 to review delivery planning, as requested. Pt unable to accommodate this appointment, no other availability at this time in schedule but patient is aware will work with clerical staff and monitor schedule for availability with MD this week. If unable/no availability will review with provider regarding recommendations/delivery scheduling prior to pt's next appt in office 1/7/25.  Patient states she is very flexible with scheduling - day/time/location, however she is only unable to schedule appt 1/2/25 between 10am-2pm, or if conflicting with M appts.  Will send message to clerical staff to request scheduling assistance, and pt aware will f/u with her today and to call office if any questions or concerns prior to scheduling/follow up call.

## 2024-12-31 ENCOUNTER — ROUTINE PRENATAL (OUTPATIENT)
Facility: HOSPITAL | Age: 39
End: 2024-12-31
Payer: COMMERCIAL

## 2024-12-31 VITALS
DIASTOLIC BLOOD PRESSURE: 74 MMHG | HEART RATE: 70 BPM | WEIGHT: 169.4 LBS | SYSTOLIC BLOOD PRESSURE: 112 MMHG | HEIGHT: 67 IN | BODY MASS INDEX: 26.59 KG/M2

## 2024-12-31 DIAGNOSIS — O36.5930 INTRAUTERINE GROWTH RESTRICTION (IUGR) AFFECTING CARE OF MOTHER, THIRD TRIMESTER, SINGLE GESTATION: ICD-10-CM

## 2024-12-31 DIAGNOSIS — Z3A.35 35 WEEKS GESTATION OF PREGNANCY: Primary | ICD-10-CM

## 2024-12-31 PROCEDURE — 59025 FETAL NON-STRESS TEST: CPT | Performed by: OBSTETRICS & GYNECOLOGY

## 2024-12-31 PROCEDURE — 76815 OB US LIMITED FETUS(S): CPT | Performed by: OBSTETRICS & GYNECOLOGY

## 2024-12-31 NOTE — LETTER
NST sleeve cover sheet    Patient name: Nicole Forte  : 1985  MRN: [id number]    HARDIK: Estimated Date of Delivery: 25    Obstetrician: ***    Reason(s) for testing: ***    Testing frequency:    ___  2x/wk  ___  1x/wk  ___  Dopplers  ___  BPP?      Last growth scan: __________, _________, _________    Baby:      Male   /   Female   /   [place]             Baby Name: ___________________            IOL or  C/S: _____________________

## 2024-12-31 NOTE — PATIENT INSTRUCTIONS
Kick Counts in Pregnancy   AMBULATORY CARE:   Kick counts  measure how much your baby is moving in your womb. A kick from your baby can be felt as a twist, turn, swish, roll, or jab. It is common to feel your baby kicking at 26 to 28 weeks of pregnancy. You may feel your baby kick as early as 20 weeks of pregnancy. You may want to start counting at 28 weeks.   Contact your doctor immediately if:   You feel a change in the number of kicks or movements of your baby.      You feel fewer than 10 kicks within 2 hours.      You have questions or concerns about your baby's movements.     Why measure kick counts:  Your baby's movement may provide information about your baby's health. He or she may move less, or not at all, if there are problems. Your baby may move less if he or she is not getting enough oxygen or nutrition from the placenta. Do not smoke while you are pregnant. Smoking decreases the amount of oxygen that gets to your baby. Talk to your healthcare provider if you need help to quit smoking. Tell your healthcare provider as soon as you feel a change in your baby's movements.  When to measure kick counts:   Measure kick counts at the same time every day.       Measure kick counts when your baby is awake and most active. Your baby may be most active in the evening.     How to measure kick counts:  Check that your baby is awake before you measure kick counts. You can wake up your baby by lightly pushing on your belly, walking, or drinking something cold. Your healthcare provider may tell you different ways to measure kick counts. You may be told to do the following:  Use a chart or clock to keep track of the time you start and finish counting.      Sit in a chair or lie on your left side.      Place your hands on the largest part of your belly.      Count until you reach 10 kicks. Write down how much time it takes to count 10 kicks.      It may take 30 minutes to 2 hours to count 10 kicks. It should not take more  than 2 hours to count 10 kicks.     Follow up with your doctor as directed:  Write down your questions so you remember to ask them during your visits.   ©  Mer2023 Information is for End User's use only and may not be sold, redistributed or otherwise used for commercial purposes.  The above information is an  only. It is not intended as medical advice for individual conditions or treatments. Talk to your doctor, nurse or pharmacist before following any medical regimen to see if it is safe and effective for you. Thank you for choosing us for your  care today.  If you have any questions about your ultrasound or care, please do not hesitate to contact us or your primary obstetrician.        Some general instructions for your pregnancy are:    Exercise: Aim for 150 minutes per week of regular exercise.  Walking is great!  Nutrition: Choose healthy sources of calcium, iron, and protein.  Avoid ultraprocessed foods and added sugar.  Learn about Preeclampsia: preeclampsia is a common, potentially serious high blood pressure complication in pregnancy.  A blood pressure of 140mmHg (systolic or top number) or 90mmHg (diastolic or bottom number) should be evaluated by your doctor.  Aspirin is sometimes prescribed in early pregnancy to prevent preeclampsia in women with risk factors - ask your obstetrician if you should be on this medication.  For more resources, visit:  https://www.highriskpregnancyinfo.org/preeclampsia  If you smoke, please try to quit completely but also try to reduce your smoking by as much as possible (as soon as possible).  Do not vape.  Please also avoid cannabis products.  Other warning signs to watch out for in pregnancy or postpartum: chest pain, obstructed breathing or shortness of breath, seizures, thoughts of hurting yourself or your baby, bleeding, a painful or swollen leg, fever, or headache (see AWHONN POST-BIRTH Warning Signs campaign).  If these happen  call 911.  Itching is also not normal in pregnancy and if you experience this, especially over your hands and feet, potentially worse at night, notify your doctors.     Nonstress Test for Pregnancy   WHAT YOU NEED TO KNOW:   What do I need to know about a nonstress test?  A nonstress test measures your baby's heart rate and movements. Nonstress means that no stress will be placed on your baby during the test.  How do I prepare for a nonstress test?  Your healthcare provider will talk to you about how to prepare for this test. Your provider may tell you to eat and drink plenty of liquids before your test. If you smoke, you may be asked not to smoke within 2 hours before the test. Your provider will also tell you which medicines to take or not take on the day of your test.  What will happen during a nonstress test?  You may be asked to lie down or recline back for the test on a bed. One or 2 belts with sensors will be placed around your abdomen. Your baby's heart rate will be recorded with a machine. If your baby does not move, your baby may be asleep. Your healthcare provider may make a noise near your abdomen to try to wake your baby. The test usually takes about 20 minutes, but can take longer if your baby needs to be awakened.        What do I need to know about the test results?  Your baby will be expected to move at least 2 times for a certain amount of time. Your baby's heart rate will be expected to go up by a certain number of beats per minute during movement. If your baby does not move as expected, the test may need to be repeated or you may need other tests.  CARE AGREEMENT:   You have the right to help plan your care. Learn about your health condition and how it may be treated. Discuss treatment options with your healthcare providers to decide what care you want to receive. You always have the right to refuse treatment. The above information is an  only. It is not intended as medical advice  for individual conditions or treatments. Talk to your doctor, nurse or pharmacist before following any medical regimen to see if it is safe and effective for you.  © Copyright [employer] 2023 Information is for End User's use only and may not be sold, redistributed or otherwise used for commercial purposes.

## 2024-12-31 NOTE — PROGRESS NOTES
Repeat Non-Stress Testing:    Patient verbalizes +FM. Pt denies ALL:               Leaking of fluid   Contractions   Vaginal bleeding   Decreased fetal movement    Patient is performing daily kick counts. Patient has no questions or concerns.   NST strip reviewed by Dr. Suh prior to completion of appointment.

## 2025-01-02 ENCOUNTER — ULTRASOUND (OUTPATIENT)
Facility: HOSPITAL | Age: 40
End: 2025-01-02
Payer: COMMERCIAL

## 2025-01-02 VITALS
BODY MASS INDEX: 26.56 KG/M2 | DIASTOLIC BLOOD PRESSURE: 76 MMHG | WEIGHT: 169.2 LBS | HEART RATE: 84 BPM | SYSTOLIC BLOOD PRESSURE: 108 MMHG | HEIGHT: 67 IN

## 2025-01-02 DIAGNOSIS — O36.5930 INTRAUTERINE GROWTH RESTRICTION (IUGR) AFFECTING CARE OF MOTHER, THIRD TRIMESTER, SINGLE GESTATION: ICD-10-CM

## 2025-01-02 DIAGNOSIS — Z3A.35 35 WEEKS GESTATION OF PREGNANCY: Primary | ICD-10-CM

## 2025-01-02 PROCEDURE — 59025 FETAL NON-STRESS TEST: CPT | Performed by: OBSTETRICS & GYNECOLOGY

## 2025-01-02 PROCEDURE — 76820 UMBILICAL ARTERY ECHO: CPT | Performed by: OBSTETRICS & GYNECOLOGY

## 2025-01-02 PROCEDURE — 76815 OB US LIMITED FETUS(S): CPT | Performed by: OBSTETRICS & GYNECOLOGY

## 2025-01-02 NOTE — LETTER
"  Date: 2025    Kimberly Zaidi MD  [address] [facility] [id number]    Patient: Nicole Forte   YOB: 1985   Date of Visit: 2025   Gestational age 35w4d   Nature of this communication: Routine       This patient was seen recently in our  office.  Please see ultrasound report under \"OB Procedures\" tab.  Please don't hesitate to contact our office with any concerns or questions.      Sincerely,      Ami Giron MD  Attending Physician, Maternal-Fetal Medicine  [facility]       "

## 2025-01-02 NOTE — PROGRESS NOTES
Repeat Non-Stress Testing:    Patient verbalizes +FM. Pt denies ALL:               Leaking of fluid   Contractions   Vaginal bleeding   Decreased fetal movement    Patient is performing daily kick counts. Patient has no questions or concerns.   NST strip reviewed by Dr. Giron.

## 2025-01-06 ENCOUNTER — HOSPITAL ENCOUNTER (OUTPATIENT)
Facility: HOSPITAL | Age: 40
Discharge: HOME | End: 2025-01-06
Attending: OBSTETRICS & GYNECOLOGY | Admitting: OBSTETRICS & GYNECOLOGY
Payer: COMMERCIAL

## 2025-01-06 ENCOUNTER — ULTRASOUND (OUTPATIENT)
Dept: OBGYN CLINIC | Facility: CLINIC | Age: 40
End: 2025-01-06

## 2025-01-06 VITALS — HEART RATE: 86 BPM | DIASTOLIC BLOOD PRESSURE: 50 MMHG | SYSTOLIC BLOOD PRESSURE: 91 MMHG

## 2025-01-06 VITALS — WEIGHT: 168 LBS | DIASTOLIC BLOOD PRESSURE: 80 MMHG | BODY MASS INDEX: 26.31 KG/M2 | SYSTOLIC BLOOD PRESSURE: 114 MMHG

## 2025-01-06 DIAGNOSIS — O36.5930 INTRAUTERINE GROWTH RESTRICTION (IUGR) AFFECTING CARE OF MOTHER, THIRD TRIMESTER, SINGLE GESTATION: Primary | ICD-10-CM

## 2025-01-06 DIAGNOSIS — O09.523 MULTIGRAVIDA OF ADVANCED MATERNAL AGE IN THIRD TRIMESTER: ICD-10-CM

## 2025-01-06 PROBLEM — Z3A.36 36 WEEKS GESTATION OF PREGNANCY: Status: ACTIVE | Noted: 2024-07-19

## 2025-01-06 PROBLEM — O36.8190 DECREASED FETAL MOVEMENT: Status: ACTIVE | Noted: 2025-01-06

## 2025-01-06 PROBLEM — O34.13 UTERINE FIBROID COMPLICATING ANTENATAL CARE, BABY NOT YET DELIVERED IN THIRD TRIMESTER: Status: ACTIVE | Noted: 2024-07-19

## 2025-01-06 PROBLEM — O36.8190 DECREASED FETAL MOVEMENT: Status: RESOLVED | Noted: 2025-01-06 | Resolved: 2025-01-06

## 2025-01-06 PROCEDURE — 99213 OFFICE O/P EST LOW 20 MIN: CPT

## 2025-01-06 PROCEDURE — PNV: Performed by: STUDENT IN AN ORGANIZED HEALTH CARE EDUCATION/TRAINING PROGRAM

## 2025-01-06 PROCEDURE — NC001 PR NO CHARGE: Performed by: OBSTETRICS & GYNECOLOGY

## 2025-01-06 PROCEDURE — 87150 DNA/RNA AMPLIFIED PROBE: CPT | Performed by: STUDENT IN AN ORGANIZED HEALTH CARE EDUCATION/TRAINING PROGRAM

## 2025-01-06 NOTE — PROGRESS NOTES
L&D Triage Note - OB/GYN  Nicole Forte 39 y.o. female MRN: [id number]  Unit/Bed#:  TRIAGE  Encounter: [id number]      ASSESSMENT:  Nicole Forte is a 39 y.o.  at 36w1d who was evaluated today for decreased fetal movement. NST is Reactive and SILVINO within normal limits with fetal movement seen on ultrasound. Patient was feeling fetal movement in triage. Safe for discharge to home with return precautions reviewed.    PLAN:  #Decreased Fetal Movement  - SILVINO: 9.4 cm  - NST Reactive   - Patient feeling movement in triage  - Fetal movement visualized on ultrasound    #36w 1d gestation of pregnancy  - Continue routine prenatal care  - Discharge from OB triage with labor precautions    - Reviewed rupture of membranes, false vs true labor, decreased fetal movement, and vaginal bleeding   - Pt to call provider with any concerns and follow up at her next scheduled prenatal appointment    - Case discussed with Dr. Trish Hatfield MD  OB/GYN PGY1  2025 2:45 PM    SUBJECTIVE:    Nicole Forte 39 y.o.  at 36w1d with an Estimated Date of Delivery: 25 presenting with decreased fetal movement. Patient denies vaginal bleeding. Patient denies having uterine contractions. Patient does not have headache, blurry vision, epigastric pain, or edema. Fetal movement is decreased.   Her current pregnancy is notable for IUGR (17th %tile), breech presentation, advanced maternal age, uterine fibroid (12.4 x 10.9 x 16.4 cm)    Her obstetrical history is significant for one     ROS:  General: in no apparent distress, well developed and well nourished, and alert  Cardiovascular: normal  HEENT: Denies blurry vision, denies headache  Pulmonary: No cough, chest pain  GI: Denies nausea, vomiting  : Denies urinary symptoms  Lower Extremity: Edema wnl for pregnancy    OBJECTIVE  Vitals:   Vitals:    25 1335   BP: 91/50   Pulse: 86     There is no height or weight on file to calculate BMI.  GBS:  Negative  Blood type: A  Estimated Date of Delivery: 2/2/25    General Physical Exam:  General: Well appearing, no acute distress  Cardiovascular: normal  Lungs: non-labored breathing  Abdomen: Soft, Non-tender, Gravid  Lower extremities: Edema wnl for pregnancy    Fetal monitoring:  Fetal heart rate: Baseline Rate (FHR): 130 bpm  Variability: Moderate  Accelerations: 15 x 15 or greater  Decelerations: None  FHR Category: Category I  [place]: Contraction Duration (seconds): 60  Contraction Intensity: Mild    Imaging:      Abd. US   SILVINO      - Q1 2.93 cm     - Q2 3.55 cm     - Q3 0 cm     - Q4 2.92 cm     - Total: 9.4 cm  Labs: No results found for this or any previous visit (from the past 24 hours).

## 2025-01-06 NOTE — ASSESSMENT & PLAN NOTE
"US 1/2 showed: \"Subserosal myometrium fibroid in the anterior fundus region, 14.2 x 12.4 x 14.0cm with a volume of 1294.8cc. Color doppler flow was not increased. The appearance was heterogeneous.\"    US 12/27 measured 12.4 x 10.9 x 16.4 cm   "

## 2025-01-06 NOTE — PROGRESS NOTES
Nicole is a 39-year-old -0-0-1 at 36 weeks and 1 day presenting for routine prenatal care-pregnancy is complicated by a large fibroid, fetal growth restriction and elevated Dopplers with a  in breech presentation and AMA.  She denies any cramping, contractions, loss of fluid or vaginal bleeding.  When he comes to fetal movement patient does report she has noticed decreased movement in the past 3 days.  This is her  delivery scheduled on Monday, 2025.  Primary  delivery was reviewed today and surgical wash was given.  Fetal heart tones were noted to be in the 120s today in office-transabdominal ultrasound showed a fetus that remains in breech presentation with minimal movement on exam.  Given decreased fetal movement for the past 3 days I recommended patient present to labor and delivery triage for further evaluation and patient amendable to plan.  Dr. Smith made aware that patient is on her way.

## 2025-01-06 NOTE — ASSESSMENT & PLAN NOTE
Evaluate for Decreased FM  - NST of 60 minutes with baseline of 126 bpm, moderate variability, acceleration 15 x 15, without evidence of decelerations  - No uterine activity on toco  - SILVINO of 9.4 cm, positive fetal cardiac activity and fetal movement on ultrasound   Placenta is anterior  Plan  - Patient instructed to call if experiencing contractions, vaginal bleeding, loss of fluid or decreased fetal movement.  - By moment of discharge patient was feeling her baby movements  - Fetal kick counts was reviewed with patient.   - Will follow up with OBGYN on 1/7/25.  RENNY/w Trish Smith MD

## 2025-01-06 NOTE — ASSESSMENT & PLAN NOTE
US 12/27   Estimated fetal weight at the 17th percentile with an abdominal circumference at the 9th percentile consistent with fetal growth restriction.  Umbilical artery Dopplers abnormal indicating increased resistance  Most likely in setting of uteroplacental insufficiency that could be secondary to large uterine fibroid

## 2025-01-08 LAB — GP B STREP DNA SPEC QL NAA+PROBE: POSITIVE

## 2025-01-09 ENCOUNTER — RESULTS FOLLOW-UP (OUTPATIENT)
Dept: OBGYN CLINIC | Facility: CLINIC | Age: 40
End: 2025-01-09

## 2025-01-10 ENCOUNTER — ULTRASOUND (OUTPATIENT)
Dept: PERINATAL CARE | Facility: CLINIC | Age: 40
End: 2025-01-10
Payer: COMMERCIAL

## 2025-01-10 VITALS
HEIGHT: 67 IN | WEIGHT: 168.4 LBS | DIASTOLIC BLOOD PRESSURE: 66 MMHG | SYSTOLIC BLOOD PRESSURE: 122 MMHG | BODY MASS INDEX: 26.43 KG/M2

## 2025-01-10 DIAGNOSIS — O09.523 MULTIGRAVIDA OF ADVANCED MATERNAL AGE IN THIRD TRIMESTER: ICD-10-CM

## 2025-01-10 DIAGNOSIS — O36.5930 INTRAUTERINE GROWTH RESTRICTION (IUGR) AFFECTING CARE OF MOTHER, THIRD TRIMESTER, SINGLE GESTATION: Primary | ICD-10-CM

## 2025-01-10 DIAGNOSIS — Z3A.36 36 WEEKS GESTATION OF PREGNANCY: ICD-10-CM

## 2025-01-10 PROCEDURE — 59025 FETAL NON-STRESS TEST: CPT | Performed by: STUDENT IN AN ORGANIZED HEALTH CARE EDUCATION/TRAINING PROGRAM

## 2025-01-10 PROCEDURE — 76820 UMBILICAL ARTERY ECHO: CPT | Performed by: STUDENT IN AN ORGANIZED HEALTH CARE EDUCATION/TRAINING PROGRAM

## 2025-01-10 PROCEDURE — 76815 OB US LIMITED FETUS(S): CPT | Performed by: STUDENT IN AN ORGANIZED HEALTH CARE EDUCATION/TRAINING PROGRAM

## 2025-01-10 NOTE — PROGRESS NOTES
This patient received  care under my supervision on 01/10/25 at 36w5d gestational age at [facility].   NST initially not reactive, multiple accels noted within 5 minutes of additional monitoring.   -Viridiana Zepeda MD

## 2025-01-10 NOTE — PROGRESS NOTES
"Repeat Non-Stress Testing:    Patient verbalizes +FM. Pt denies ALL:               Leaking of fluid   Contractions   Vaginal bleeding   Decreased fetal movement    Patient is performing daily kick counts. Patient has no questions or concerns.   NST strip reviewed by Dr. Zepeda.  Reviewed first 20 minutes with Dr. Zeepda. Per Dr. Zepeda, \"keep patient on the monitor until her room is ready\". Patient and significant other made aware. Patient verbalized understanding.     12/27/2024 ultrasound findings: Overall at the 17th percentile with an abdominal circumference at the 9th percentile consistent with fetal growth restriction.       "

## 2025-01-10 NOTE — PROGRESS NOTES
Phone call to patient for pre-operative instructions prior to .   Pt was instructed to arrive 2 hours before her scheduled OR time (0800 arrival, 1000 )     Pt instructed to remain NPO after midnight.              *This includes gum, water and hard candy.  *If patient takes AM meds (e.g.hypertensive meds) they may take these meds with a sip of water.    *This should not include medications like prenatal vitamins, aspirin, or colace.   *Type 1 diabetics should take their insulin as normal. Type 2/A2GDM should take a half dose of nighttime insulin.    Pt should brush their teeth as usual.     Pt was instructed to buy and use a pre-surgical wash containing chlorhexidine the night before and the morning of her scheduled  and to wear clean clothing after the wash.  Pt instructed not to shave operative area prior to surgery.     Pt was asked not to wear any jewelry and to leave all of her valuables at home.     Pt was asked to leave all of her larger bags and suitcases in the car to be brought in after she is assigned a post partum room.  Pt should bring in any paperwork she was given in the office.     Pt was informed that she may have 1 support person in the OR/PACU area and of the current visiting policies.  Support person should eat prior to the surgery.

## 2025-01-13 ENCOUNTER — HOSPITAL ENCOUNTER (INPATIENT)
Facility: HOSPITAL | Age: 40
LOS: 2 days | Discharge: HOME | End: 2025-01-15
Attending: OBSTETRICS & GYNECOLOGY | Admitting: OBSTETRICS & GYNECOLOGY
Payer: COMMERCIAL

## 2025-01-13 ENCOUNTER — ANESTHESIA (INPATIENT)
Dept: LABOR AND DELIVERY | Facility: HOSPITAL | Age: 40
End: 2025-01-13
Payer: COMMERCIAL

## 2025-01-13 ENCOUNTER — ANESTHESIA EVENT (INPATIENT)
Dept: LABOR AND DELIVERY | Facility: HOSPITAL | Age: 40
End: 2025-01-13
Payer: COMMERCIAL

## 2025-01-13 DIAGNOSIS — O36.5990 FETAL GROWTH RESTRICTION ANTEPARTUM: ICD-10-CM

## 2025-01-13 DIAGNOSIS — Z3A.37 37 WEEKS GESTATION OF PREGNANCY: Primary | ICD-10-CM

## 2025-01-13 DIAGNOSIS — Z98.891 S/P PRIMARY LOW TRANSVERSE C-SECTION: ICD-10-CM

## 2025-01-13 LAB
ABO GROUP BLD: NORMAL
BASE EXCESS BLDCOA CALC-SCNC: -1.1 MMOL/L (ref 3–11)
BASE EXCESS BLDCOV CALC-SCNC: 1 MMOL/L (ref 1–9)
BLD GP AB SCN SERPL QL: NEGATIVE
ERYTHROCYTE [DISTWIDTH] IN BLOOD BY AUTOMATED COUNT: 14.2 % (ref 11.6–15.1)
HCO3 BLDCOA-SCNC: 24.9 MMOL/L (ref 17.3–27.3)
HCO3 BLDCOV-SCNC: 25.2 MMOL/L (ref 12.2–28.6)
HCT VFR BLD AUTO: 33.8 % (ref 34.8–46.1)
HGB BLD-MCNC: 11.2 G/DL (ref 11.5–15.4)
HOLD SPECIMEN: NORMAL
MCH RBC QN AUTO: 29.5 PG (ref 26.8–34.3)
MCHC RBC AUTO-ENTMCNC: 33.1 G/DL (ref 31.4–37.4)
MCV RBC AUTO: 89 FL (ref 82–98)
O2 CT VFR BLDCOA CALC: 6.6 ML/DL
OXYHGB MFR BLDCOA: 35.5 %
OXYHGB MFR BLDCOV: 70.8 %
PCO2 BLDCOA: 46.4 MM[HG] (ref 30–60)
PCO2 BLDCOV: 38.6 MM HG (ref 27–43)
PH BLDCOA: 7.35 [PH] (ref 7.23–7.43)
PH BLDCOV: 7.43 [PH] (ref 7.19–7.49)
PLATELET # BLD AUTO: 254 THOUSANDS/UL (ref 149–390)
PMV BLD AUTO: 9.8 FL (ref 8.9–12.7)
PO2 BLDCOA: 16.5 MM HG (ref 5–25)
PO2 BLDCOV: 26.8 MM HG (ref 15–45)
RBC # BLD AUTO: 3.8 MILLION/UL (ref 3.81–5.12)
RH BLD: POSITIVE
SAO2 % BLDCOV: 13.5 ML/DL
SPECIMEN EXPIRATION DATE: NORMAL
TREPONEMA PALLIDUM IGG+IGM AB [PRESENCE] IN SERUM OR PLASMA BY IMMUNOASSAY: NORMAL
WBC # BLD AUTO: 6.06 THOUSAND/UL (ref 4.31–10.16)

## 2025-01-13 PROCEDURE — 86900 BLOOD TYPING SEROLOGIC ABO: CPT

## 2025-01-13 PROCEDURE — 86780 TREPONEMA PALLIDUM: CPT

## 2025-01-13 PROCEDURE — 86901 BLOOD TYPING SEROLOGIC RH(D): CPT

## 2025-01-13 PROCEDURE — NC001 PR NO CHARGE: Performed by: OBSTETRICS & GYNECOLOGY

## 2025-01-13 PROCEDURE — 86850 RBC ANTIBODY SCREEN: CPT

## 2025-01-13 PROCEDURE — 58611 LIGATE OVIDUCT(S) ADD-ON: CPT | Performed by: OBSTETRICS & GYNECOLOGY

## 2025-01-13 PROCEDURE — 88302 TISSUE EXAM BY PATHOLOGIST: CPT | Performed by: PATHOLOGY

## 2025-01-13 PROCEDURE — 0UT70ZZ RESECTION OF BILATERAL FALLOPIAN TUBES, OPEN APPROACH: ICD-10-PCS | Performed by: OBSTETRICS & GYNECOLOGY

## 2025-01-13 PROCEDURE — 99024 POSTOP FOLLOW-UP VISIT: CPT | Performed by: OBSTETRICS & GYNECOLOGY

## 2025-01-13 PROCEDURE — 59510 CESAREAN DELIVERY: CPT | Performed by: OBSTETRICS & GYNECOLOGY

## 2025-01-13 PROCEDURE — 85027 COMPLETE CBC AUTOMATED: CPT

## 2025-01-13 PROCEDURE — 82805 BLOOD GASES W/O2 SATURATION: CPT | Performed by: OBSTETRICS & GYNECOLOGY

## 2025-01-13 RX ORDER — DIPHENHYDRAMINE HCL 25 MG
25 TABLET ORAL EVERY 6 HOURS PRN
Status: DISCONTINUED | OUTPATIENT
Start: 2025-01-13 | End: 2025-01-15 | Stop reason: HOSPADM

## 2025-01-13 RX ORDER — KETOROLAC TROMETHAMINE 30 MG/ML
15 INJECTION, SOLUTION INTRAMUSCULAR; INTRAVENOUS EVERY 6 HOURS SCHEDULED
Status: COMPLETED | OUTPATIENT
Start: 2025-01-13 | End: 2025-01-14

## 2025-01-13 RX ORDER — BUPIVACAINE HYDROCHLORIDE 7.5 MG/ML
INJECTION, SOLUTION INTRASPINAL AS NEEDED
Status: DISCONTINUED | OUTPATIENT
Start: 2025-01-13 | End: 2025-01-13

## 2025-01-13 RX ORDER — MIDAZOLAM HYDROCHLORIDE 2 MG/2ML
INJECTION, SOLUTION INTRAMUSCULAR; INTRAVENOUS AS NEEDED
Status: DISCONTINUED | OUTPATIENT
Start: 2025-01-13 | End: 2025-01-13

## 2025-01-13 RX ORDER — POLYETHYLENE GLYCOL 3350 17 G/17G
17 POWDER, FOR SOLUTION ORAL DAILY PRN
Status: DISCONTINUED | OUTPATIENT
Start: 2025-01-13 | End: 2025-01-15 | Stop reason: HOSPADM

## 2025-01-13 RX ORDER — GLYCOPYRROLATE 0.2 MG/ML
INJECTION INTRAMUSCULAR; INTRAVENOUS AS NEEDED
Status: DISCONTINUED | OUTPATIENT
Start: 2025-01-13 | End: 2025-01-13

## 2025-01-13 RX ORDER — OXYTOCIN/RINGER'S LACTATE 30/500 ML
62.5 PLASTIC BAG, INJECTION (ML) INTRAVENOUS ONCE
Status: COMPLETED | OUTPATIENT
Start: 2025-01-13 | End: 2025-01-13

## 2025-01-13 RX ORDER — IBUPROFEN 400 MG/1
800 TABLET, FILM COATED ORAL EVERY 6 HOURS
Status: DISCONTINUED | OUTPATIENT
Start: 2025-01-14 | End: 2025-01-15

## 2025-01-13 RX ORDER — ONDANSETRON 2 MG/ML
4 INJECTION INTRAMUSCULAR; INTRAVENOUS EVERY 8 HOURS PRN
Status: DISCONTINUED | OUTPATIENT
Start: 2025-01-13 | End: 2025-01-13

## 2025-01-13 RX ORDER — OXYCODONE HYDROCHLORIDE 10 MG/1
10 TABLET ORAL EVERY 4 HOURS PRN
Refills: 0 | Status: DISCONTINUED | OUTPATIENT
Start: 2025-01-14 | End: 2025-01-13

## 2025-01-13 RX ORDER — FENTANYL CITRATE 50 UG/ML
INJECTION, SOLUTION INTRAMUSCULAR; INTRAVENOUS AS NEEDED
Status: DISCONTINUED | OUTPATIENT
Start: 2025-01-13 | End: 2025-01-13

## 2025-01-13 RX ORDER — ACETAMINOPHEN 325 MG/1
650 TABLET ORAL EVERY 6 HOURS SCHEDULED
Status: DISCONTINUED | OUTPATIENT
Start: 2025-01-13 | End: 2025-01-13

## 2025-01-13 RX ORDER — OXYCODONE HYDROCHLORIDE 10 MG/1
10 TABLET ORAL EVERY 4 HOURS PRN
Refills: 0 | Status: DISCONTINUED | OUTPATIENT
Start: 2025-01-14 | End: 2025-01-14

## 2025-01-13 RX ORDER — ONDANSETRON 2 MG/ML
INJECTION INTRAMUSCULAR; INTRAVENOUS AS NEEDED
Status: DISCONTINUED | OUTPATIENT
Start: 2025-01-13 | End: 2025-01-13

## 2025-01-13 RX ORDER — KETOROLAC TROMETHAMINE 30 MG/ML
15 INJECTION, SOLUTION INTRAMUSCULAR; INTRAVENOUS EVERY 6 HOURS SCHEDULED
Status: DISCONTINUED | OUTPATIENT
Start: 2025-01-13 | End: 2025-01-13

## 2025-01-13 RX ORDER — HYDROMORPHONE HCL/PF 1 MG/ML
SYRINGE (ML) INJECTION AS NEEDED
Status: DISCONTINUED | OUTPATIENT
Start: 2025-01-13 | End: 2025-01-13

## 2025-01-13 RX ORDER — FAMOTIDINE 20 MG/1
20 TABLET, FILM COATED ORAL 2 TIMES DAILY
Status: DISCONTINUED | OUTPATIENT
Start: 2025-01-13 | End: 2025-01-15 | Stop reason: HOSPADM

## 2025-01-13 RX ORDER — PANTOPRAZOLE SODIUM 20 MG/1
20 TABLET, DELAYED RELEASE ORAL
Status: DISCONTINUED | OUTPATIENT
Start: 2025-01-13 | End: 2025-01-15 | Stop reason: HOSPADM

## 2025-01-13 RX ORDER — IBUPROFEN 600 MG/1
600 TABLET, FILM COATED ORAL EVERY 6 HOURS
Status: DISCONTINUED | OUTPATIENT
Start: 2025-01-14 | End: 2025-01-13

## 2025-01-13 RX ORDER — BENZOCAINE/MENTHOL 6 MG-10 MG
1 LOZENGE MUCOUS MEMBRANE DAILY PRN
Status: DISCONTINUED | OUTPATIENT
Start: 2025-01-13 | End: 2025-01-15 | Stop reason: HOSPADM

## 2025-01-13 RX ORDER — OXYCODONE HYDROCHLORIDE 5 MG/1
5 TABLET ORAL EVERY 4 HOURS PRN
Status: DISCONTINUED | OUTPATIENT
Start: 2025-01-14 | End: 2025-01-13

## 2025-01-13 RX ORDER — CHLORAL HYDRATE 500 MG
1000 CAPSULE ORAL DAILY
Status: DISCONTINUED | OUTPATIENT
Start: 2025-01-13 | End: 2025-01-15 | Stop reason: HOSPADM

## 2025-01-13 RX ORDER — DEXAMETHASONE SODIUM PHOSPHATE 10 MG/ML
INJECTION, SOLUTION INTRAMUSCULAR; INTRAVENOUS AS NEEDED
Status: DISCONTINUED | OUTPATIENT
Start: 2025-01-13 | End: 2025-01-13

## 2025-01-13 RX ORDER — ACETAMINOPHEN 10 MG/ML
1000 INJECTION, SOLUTION INTRAVENOUS ONCE
Status: COMPLETED | OUTPATIENT
Start: 2025-01-13 | End: 2025-01-13

## 2025-01-13 RX ORDER — CALCIUM CARBONATE 500 MG/1
1000 TABLET, CHEWABLE ORAL DAILY PRN
Status: DISCONTINUED | OUTPATIENT
Start: 2025-01-13 | End: 2025-01-15 | Stop reason: HOSPADM

## 2025-01-13 RX ORDER — OXYTOCIN/RINGER'S LACTATE 30/500 ML
PLASTIC BAG, INJECTION (ML) INTRAVENOUS CONTINUOUS PRN
Status: DISCONTINUED | OUTPATIENT
Start: 2025-01-13 | End: 2025-01-13

## 2025-01-13 RX ORDER — TRANEXAMIC ACID 10 MG/ML
INJECTION, SOLUTION INTRAVENOUS AS NEEDED
Status: DISCONTINUED | OUTPATIENT
Start: 2025-01-13 | End: 2025-01-13

## 2025-01-13 RX ORDER — ACETAMINOPHEN 325 MG/1
975 TABLET ORAL EVERY 6 HOURS SCHEDULED
Status: DISCONTINUED | OUTPATIENT
Start: 2025-01-13 | End: 2025-01-15

## 2025-01-13 RX ORDER — SIMETHICONE 80 MG
80 TABLET,CHEWABLE ORAL 4 TIMES DAILY PRN
Status: DISCONTINUED | OUTPATIENT
Start: 2025-01-13 | End: 2025-01-15 | Stop reason: HOSPADM

## 2025-01-13 RX ORDER — KETOROLAC TROMETHAMINE 30 MG/ML
INJECTION, SOLUTION INTRAMUSCULAR; INTRAVENOUS AS NEEDED
Status: DISCONTINUED | OUTPATIENT
Start: 2025-01-13 | End: 2025-01-13

## 2025-01-13 RX ORDER — CEFAZOLIN SODIUM 2 G/50ML
2000 SOLUTION INTRAVENOUS ONCE
Status: COMPLETED | OUTPATIENT
Start: 2025-01-13 | End: 2025-01-13

## 2025-01-13 RX ORDER — KETAMINE HYDROCHLORIDE 100 MG/ML
INJECTION, SOLUTION INTRAMUSCULAR; INTRAVENOUS AS NEEDED
Status: DISCONTINUED | OUTPATIENT
Start: 2025-01-13 | End: 2025-01-13

## 2025-01-13 RX ORDER — MORPHINE SULFATE 0.5 MG/ML
INJECTION, SOLUTION EPIDURAL; INTRATHECAL; INTRAVENOUS AS NEEDED
Status: DISCONTINUED | OUTPATIENT
Start: 2025-01-13 | End: 2025-01-13

## 2025-01-13 RX ORDER — SODIUM CHLORIDE, SODIUM LACTATE, POTASSIUM CHLORIDE, CALCIUM CHLORIDE 600; 310; 30; 20 MG/100ML; MG/100ML; MG/100ML; MG/100ML
125 INJECTION, SOLUTION INTRAVENOUS CONTINUOUS
Status: DISCONTINUED | OUTPATIENT
Start: 2025-01-13 | End: 2025-01-15 | Stop reason: HOSPADM

## 2025-01-13 RX ORDER — OXYCODONE HYDROCHLORIDE 5 MG/1
5 TABLET ORAL ONCE
Refills: 0 | Status: COMPLETED | OUTPATIENT
Start: 2025-01-13 | End: 2025-01-13

## 2025-01-13 RX ADMIN — SODIUM CHLORIDE, SODIUM LACTATE, POTASSIUM CHLORIDE, AND CALCIUM CHLORIDE 125 ML/HR: .6; .31; .03; .02 INJECTION, SOLUTION INTRAVENOUS at 08:31

## 2025-01-13 RX ADMIN — OXYCODONE HYDROCHLORIDE 5 MG: 5 TABLET ORAL at 16:49

## 2025-01-13 RX ADMIN — MORPHINE SULFATE 0.15 MG: 0.5 INJECTION, SOLUTION EPIDURAL; INTRATHECAL; INTRAVENOUS at 10:41

## 2025-01-13 RX ADMIN — KETOROLAC TROMETHAMINE 15 MG: 30 INJECTION, SOLUTION INTRAMUSCULAR; INTRAVENOUS at 18:17

## 2025-01-13 RX ADMIN — SODIUM CHLORIDE, SODIUM LACTATE, POTASSIUM CHLORIDE, AND CALCIUM CHLORIDE: .6; .31; .03; .02 INJECTION, SOLUTION INTRAVENOUS at 11:56

## 2025-01-13 RX ADMIN — KETAMINE HYDROCHLORIDE 20 MG: 100 INJECTION INTRAMUSCULAR; INTRAVENOUS at 11:33

## 2025-01-13 RX ADMIN — FENTANYL CITRATE 85 MCG: 50 INJECTION, SOLUTION INTRAMUSCULAR; INTRAVENOUS at 11:11

## 2025-01-13 RX ADMIN — Medication 999 MILLI-UNITS/MIN: at 11:39

## 2025-01-13 RX ADMIN — GLYCOPYRROLATE 0.2 MCG: 0.2 INJECTION INTRAMUSCULAR; INTRAVENOUS at 10:39

## 2025-01-13 RX ADMIN — ONDANSETRON 8 MG: 2 INJECTION, SOLUTION INTRAMUSCULAR; INTRAVENOUS at 10:38

## 2025-01-13 RX ADMIN — CEFAZOLIN SODIUM 2000 MG: 2 SOLUTION INTRAVENOUS at 10:42

## 2025-01-13 RX ADMIN — GLYCOPYRROLATE 0.2 MCG: 0.2 INJECTION INTRAMUSCULAR; INTRAVENOUS at 12:06

## 2025-01-13 RX ADMIN — FENTANYL CITRATE 15 MCG: 50 INJECTION INTRAMUSCULAR; INTRAVENOUS at 10:41

## 2025-01-13 RX ADMIN — Medication 999 MILLI-UNITS/MIN: at 11:09

## 2025-01-13 RX ADMIN — DEXAMETHASONE SODIUM PHOSPHATE 10 MG: 10 INJECTION INTRAMUSCULAR; INTRAVENOUS at 10:54

## 2025-01-13 RX ADMIN — BUPIVACAINE HYDROCHLORIDE IN DEXTROSE 1.6 ML: 7.5 INJECTION, SOLUTION SUBARACHNOID at 10:41

## 2025-01-13 RX ADMIN — TRANEXAMIC ACID 1000 MG: 10 INJECTION, SOLUTION INTRAVENOUS at 11:13

## 2025-01-13 RX ADMIN — ACETAMINOPHEN 975 MG: 325 TABLET, FILM COATED ORAL at 18:16

## 2025-01-13 RX ADMIN — ACETAMINOPHEN 1000 MG: 10 INJECTION INTRAVENOUS at 12:32

## 2025-01-13 RX ADMIN — KETAMINE HYDROCHLORIDE 20 MG: 100 INJECTION INTRAMUSCULAR; INTRAVENOUS at 11:40

## 2025-01-13 RX ADMIN — PHENYLEPHRINE HYDROCHLORIDE 40 MCG/MIN: 50 INJECTION INTRAVENOUS at 10:42

## 2025-01-13 RX ADMIN — HYDROMORPHONE HYDROCHLORIDE 0.5 MG: 1 INJECTION, SOLUTION INTRAMUSCULAR; INTRAVENOUS; SUBCUTANEOUS at 11:28

## 2025-01-13 RX ADMIN — OXYTOCIN 62.5 MILLI-UNITS/MIN: 10 INJECTION INTRAVENOUS at 13:30

## 2025-01-13 RX ADMIN — KETAMINE HYDROCHLORIDE 10 MG: 100 INJECTION INTRAMUSCULAR; INTRAVENOUS at 11:44

## 2025-01-13 RX ADMIN — SODIUM CHLORIDE, SODIUM LACTATE, POTASSIUM CHLORIDE, AND CALCIUM CHLORIDE: .6; .31; .03; .02 INJECTION, SOLUTION INTRAVENOUS at 11:15

## 2025-01-13 RX ADMIN — KETOROLAC TROMETHAMINE 30 MG: 30 INJECTION, SOLUTION INTRAMUSCULAR; INTRAVENOUS at 12:01

## 2025-01-13 RX ADMIN — MIDAZOLAM 2 MG: 1 INJECTION INTRAMUSCULAR; INTRAVENOUS at 11:33

## 2025-01-13 NOTE — ANESTHESIA POSTPROCEDURE EVALUATION
Post-Op Assessment Note    CV Status:  Stable  Pain Score: 5    Pain management: satisfactory to patient       Mental Status:  Alert and awake   Hydration Status:  Stable   PONV Controlled:  Controlled   Airway Patency:  Patent and adequate     Post Op Vitals Reviewed: Yes    No anethesia notable event occurred.    Staff: CRNA, Anesthesiologist           Last Filed PACU Vitals:  Vitals Value Taken Time   Temp 97    Pulse 79 01/13/25 1238   /58 01/13/25 1238   Resp 12    SpO2 100 % 01/13/25 1238   Vitals shown include unfiled device data.

## 2025-01-13 NOTE — H&P
H & P- Obstetrics   Nicole Forte 39 y.o. female MRN: [id number]  Unit/Bed#: LD PACU-02 Encounter: [id number]    ObGyn Provider: [facility]   Assessment: 39 y.o.  at 37w1d (2025, by Ultrasound) admitted for primary  section for fetal malpresentation (transverse).  FHT Category 1  Clinical EFW:  2215  g (17%ile) @ 34w5d; Cephalic confirmed by BSUS  GBS: positive  Postpartum contraception plan: condoms    Plan:   Breech presentation  Assessment & Plan  Admit to OBGYN  CBC, RPR, Blood Type & Screen  Anesthesia consult for spinal  cefazolin given for prophylaxis  NPO   To OR for  delivery     Uterine fibroid complicating  care, baby not yet delivered in third trimester  Assessment & Plan  Subserosal myometrium fibroid located in the anterior fundus region,  measuring 12.4 x 10.9 x 16.4cm with a volume of 1166.9cc. Color doppler  flow was not increased. The appearance was heterogeneous.    Discussed case and plan w/ Tata Kan MD    Chief Complaint: I am here for my   HPI: Nicole Forte is a 39 y.o.  with an HARDIK of 2025, by Ultrasound at 37w1d who is being admitted for primary  for fetal malpresentation. She denies having uterine contractions, has no LOF, and reports no VB. She states she has felt good FM.  Her current pregnancy is notable for GBS positive    ROS  General: No fevers, chills or night sweats  Cardiovascular: No chest pain, or wheezing  HEENT: No visual changes  GI: No diarrhea, No blood in stools or black stools  : No dysuria or hematuria    No Known Allergies  OB History    Para Term  AB Living   2 1 1   1   SAB IAB Ectopic Multiple Live Births       1      # Outcome Date GA Lbr Tristin/2nd Weight Sex Type Anes PTL Lv   2 Current            1 Term 17 40w0d  3175 g (7 lb) M Vag-Spont   USAMA     Past Medical History:   Diagnosis Date    Varicella     chicken pox as child     Past Surgical History:   Procedure  Laterality Date    INCISION AND DRAINAGE OF WOUND Left 9/22/2021    Procedure: INCISION AND DRAINAGE (I&D) HEAD/FACE and extraction of tooth #18;  Surgeon: Jourdan Perkins DMD;  Location: [facility];  Service: Maxillofacial     Social History     Tobacco Use   Smoking Status Never   Smokeless Tobacco Never       Medications Prior to Admission:     acetaminophen (TYLENOL) 325 mg tablet    famotidine (PEPCID) 20 mg tablet    Magnesium Chloride-Calcium (SLOW MAGNESIUM/CALCIUM PO)    Omega-3 Fatty Acids (Fish Oil) 1200 MG CAPS    omeprazole (PriLOSEC) 20 mg delayed release capsule    ondansetron (ZOFRAN) 4 mg tablet    Prenatal Vit-Fe Sulfate-FA-DHA (Prenatal Vitamin/Min +DHA) 27-0.8-200 MG CAPS    Objective:     There is no height or weight on file to calculate BMI.   General Physical Exam  General: The patient was alert and oriented x3, pleasant well-appearing female in no acute distress  Lungs: Non-labored breathing  Abdomen: Soft, Non-tender, Gravid, Palpable right fibroid  Lower extremities: Non-tender  Psych: Normal affect and judgement, cooperative    Fetal monitoring:  Fetal heart rate:    120s bpm  [place]:  None  Labs:  Lab Results   Component Value Date    WBC 6.06 01/13/2025    HGB 11.2 (L) 01/13/2025    HCT 33.8 (L) 01/13/2025     01/13/2025     Lab Results   Component Value Date    K 4.0 09/23/2021    CL 99 (L) 09/23/2021    CO2 30 09/23/2021    BUN 9 09/23/2021    CREATININE 0.87 09/23/2021    AST 44 09/22/2021    ALT 64 09/22/2021     Prenatal Labs: Reviewed   Lab Results   Component Value Date    ABO A 07/19/2024    RH Positive 07/19/2024    ABS Negative 07/19/2024    STREPGRPB Positive (A) 01/06/2025    SYPHILISAB Non-reactive 11/08/2024    HEPBSAG Non-reactive 07/19/2024    HEPCAB Non-reactive 07/19/2024    LQS4VYWH70DU 103 11/08/2024    RUBELLAIGGQT 21.0 07/19/2024    PRIMINTERP Negative for intraepithelial lesion or malignancy 11/17/2023        >2 Midnights  INPATIENT   Signature/Title:  Jennifer  MD Liu  Date: 1/13/2025  Time: 9:06 AM

## 2025-01-13 NOTE — OP NOTE
OPERATIVE REPORT  PATIENT NAME: Nicole Forte    :  1985  MRN: [id number]  Pt Location: [facility] OR [facility] 01    SURGERY DATE: 2025    Surgeons and Role:     * Tata Iniguez MD - Primary     * Jennifer Hatfield MD - Assisting    Preop Diagnosis:  Fetal growth restriction antepartum [O36.5990]    Post-Op Diagnosis Codes:     * Fetal growth restriction antepartum [O36.5990]    Procedure(s) (LRB):   SECTION () (N/A)  SALPINGECTOMY (N/A)    Specimen(s):  ID Type Source Tests Collected by Time Destination   1 : PRN Tissue Fallopian Tube, Left TISSUE EXAM Tata Iniguez MD 2025 113    2 : PRN Tissue Fallopian Tube, Right TISSUE EXAM Tata Iniguez MD 2025 1137    A :  Tissue (Placenta on Hold) OB Only Placenta PLACENTA IN STORAGE Tata Iniguez MD 2025 1110    D :  Cord Blood Cord BLOOD GAS, VENOUS, CORD, BLOOD GAS, ARTERIAL, CORD Tata Iniguez MD 2025 1109        Surgical QBL:  Surgical QBL (mL): 1568 mL      Drains:  Open Drain Anterior;Left Neck (Active)   Number of days: 1209       Urethral Catheter Non-latex 16 Fr. (Active)   Number of days: 0       Anesthesia Type:   Spinal with IV medications    Operative Indications:  Fetal growth restriction antepartum [O36.5990]    Antibiotics:  The patient received cefazolin per protocol    Surgeon: Tata Kan MD  Assistant(s):  Jennifer Hatfield MD    Findings:  1. Delivery of viable female on 25 at 1107, weight 5lbs 15oz;  Apgar scores of 9 at one minute and 9 at five minutes.  2. Normal appearing placenta with centrally-inserted 3 vessel cord  3. Normal appearing amniotic fluid  4. Absent adhesions  5. Large fibroid uterus with prominent vasculature. Grossly normal tubes, and ovaries  6. Umbilical Cord Venous Blood Gas:  Results from last 7 days   Lab Units 25  1109   PH COV  7.432   PCO2 COV mm HG 38.6   HCO3 COV mmol/L 25.2   BASE EXC COV mmol/L 1.0   O2 CT CD VB mL/dL 13.5   O2 HGB, VENOUS  CORD % 70.8     7. Umbilical Cord Arterial Blood Gas:  Results from last 7 days   Lab Units 25  1109   PH COA  7.347   PCO2 COA  46.4   PO2 COA mm HG 16.5   HCO3 COA mmol/L 24.9   BASE EXC COA mmol/L -1.1*   O2 CONTENT CORD ART ml/dl 6.6   O2 HGB, ARTERIAL CORD % 35.5       Blood Loss:   1568  mL    Drains: Lynn catheter           Complications:  None apparent, patient tolerated the procedure well.           Disposition: PACU           Condition: Stable    Procedure Details   Decision was made to proceed with  section due to malpresentation: breech . The patient was seen prior to the procedure. Risks, benefits, possible complications, alternate treatment options, and expected outcomes were discussed with the patient. The patient agreed with the proposed plan and gave informed consent.      After standard preoperative preparation, The patient was taken to the operating room where a time out was performed with the attending surgeon present. Written consent was confirmed. Under Spinal anesthesia with a lynn catheter inserted, the patient was prepped and draped in the usual sterile fashion in the supine position with a leftward tilt. SCD's were placed. A Time Out was held and the above information confirmed. The patient was identified as Nicole Forte and the procedure verified as  Delivery.    Depth of anesthesia and administration of prophylactic antibiotics was confirmed. A standard Pfannenstiel-type incision was made with the scalpel and carried down to the underlying fascia in the midline sharply. The subcutaneous tissue and was further dissected with blunt dissection. Hemostasis was excellent. Scar tissue was not noted. The peritoneum was identified. An Monico Retractor was placed. The uterus was palpated, was found to be rotated with a leftward tilt and was free of adhesions.    A low transverse uterine incision was made with the scalpel and extended laterally with blunt dissection.  Membranes were ruptured to clear fluid. The fetus was presenting as breech. Fetal buttocks were delivered 1st. The rest of the fetus was delivered manually using standard breech maneuvers. Fetal legs were delivered followed by torso, arms and head. Examination of the fetal neck revealed no evidence of nuchal cord. The infant was delivered without complication. Delayed cord clamping was performed. Cord was doubly clamped and transected. The infant was handed to the awaiting pediatrics team. A segment of cord was sent for cord gases. Cord blood was taken at this time.     The placenta was delivered manually and appeared normal. The uterus was kept in situ and a moist lap sponge was used to clear the cavity of clots and products of conception. Brisk bleeding was noted and extra pitocin and TXA were given to assist with hemostasis. The uterine incision was then closed in two layers in running fashion with 0 Vicryl suture. The first layer was locking and the second was imbricating. 2 figure-of-eight sutures were required for good hemostasis. The paracolic gutters were inspected and cleared of all clots and debris with moist lap sponges. The decision was made to exteriorize the uterus to allow for salpingectomy as we were unable to access the tubes insitu. Patient was given IV pain medication at this time due to discomfort.     Attention was then turned to the adnexa to perform a bilateral salpingectomy. The above information re-confirmed and the patient agreed to proceed with the procedure. The left fallopian tube was isolated and followed all the way to the fimbriated ends. This was tented up with 2 Bradford clamps around the proximal portion of the tubal ampulla revealing the vascular supply of the mesosalpinx. The Enseal device was used to ligate along the mesosalpinx, working proximally and taking care to avoid ovarian vasculature. Approximately 2cm from the cornua, the Enseal was used to amputate fallopian tube. The  contralateral fallopian tube was then excised in similar fashion. Both tubes sent to the lab for pathology. Good hemostasis was confirmed following salpingectomy. The subfascial plane and peritoneal edges were all inspected and found to be hemostatic.     Returning attention back to completing the  delivery. The rectus muscles were left to reapproximate by secondary intention. Initial count confirmed the absence of retained laparotomy sponges in the surgical field (this was confirmed by RFID scan after fascial closure). The fascial incision was closed with a single running length of 0 Vicryl, subcutaneous tissues were closed with 2-0 Plain Gut. The skin was closed with 4-0 Monocryl in a a running subcuticular stitch. Telfa was applied.     At the conclusion of the procedure, all needle, sponge, and instrument counts were noted to be correct x2. The patient tolerated the procedure well and was transferred to the recovery room in stable condition.     Dr. Tata Kan MD was present and participated in all key portions of the case.    Jennifer Hatfield MD  Resident - Ob/Gyn          Fibroid uterus

## 2025-01-13 NOTE — ASSESSMENT & PLAN NOTE
Admit to OBGYN  CBC, RPR, Blood Type & Screen  Anesthesia consult for spinal  cefazolin given for prophylaxis  NPO   To OR for  delivery

## 2025-01-13 NOTE — QUICK NOTE
"Obstetrics & Gynecology Post-Operative Check  Nicole Forte 39 y.o. female MRN: [id number]  Unit/Bed#: -01 Encounter: [id number]      SUBJECTIVE:      Nicole Forte doing well. Pain controlled. Denies nausea/vomiting. Denies chest pain, shortness of breath. No complaints at this time.    OBJECTIVE:  Vitals:   /72 (BP Location: Right arm)   Pulse (!) 52   Temp (!) 97.3 °F (36.3 °C) (Oral) Comment: drank cold water, nurse notified  Resp 16   Ht 5' 7\" (1.702 m)   Wt 76.2 kg (168 lb)   LMP 04/18/2024   SpO2 100%   BMI 26.31 kg/m²       Intake/Output Summary (Last 24 hours) at 1/13/2025 1621  Last data filed at 1/13/2025 1358  Gross per 24 hour   Intake 3250 ml   Output 1943 ml   Net 1307 ml       Invasive Devices       Peripheral Intravenous Line  Duration             Peripheral IV 01/13/25 Left;Ventral (anterior) Forearm <1 day              Drain  Duration             Open Drain Anterior;Left Neck 1209 days    Urethral Catheter Non-latex 16 Fr. <1 day                    Physical Exam:   GEN: appears well, alert and oriented x 3, pleasant and cooperative   CARDIAC: regular rate  PULMONARY: non-labored breathing  ABDOMEN: soft, no tenderness, no distention, Incision c/d/I, fundus below umbilicus  EXTREMITIES: SCDs on    Labs:   Admission on 01/13/2025   Component Date Value    ABO Grouping 01/13/2025 A     Rh Factor 01/13/2025 Positive     Antibody Screen 01/13/2025 Negative     Specimen Expiration Date 01/13/2025 20250116     WBC 01/13/2025 6.06     RBC 01/13/2025 3.80 (L)     Hemoglobin 01/13/2025 11.2 (L)     Hematocrit 01/13/2025 33.8 (L)     MCV 01/13/2025 89     MCH 01/13/2025 29.5     MCHC 01/13/2025 33.1     RDW 01/13/2025 14.2     Platelets 01/13/2025 254     MPV 01/13/2025 9.8     Extra Tube 01/13/2025 Hold for add-ons.     Syphilis Total Antibody 01/13/2025 Non-reactive     pH, Cord Victor M 01/13/2025 7.432     pCO2, Cord Victor M 01/13/2025 38.6     pO2, Cord Victor M 01/13/2025 26.8     HCO3, Cord Victor M " 01/13/2025 25.2     Base Exc, Cord Victor M 01/13/2025 1.0     O2 Cont, Cord Victor M 01/13/2025 13.5     O2 HGB,VENOUS CORD 01/13/2025 70.8     pH, Cord Art 01/13/2025 7.347     pCO2, Cord Art 01/13/2025 46.4     pO2, Cord Art 01/13/2025 16.5     HCO3, Cord Art 01/13/2025 24.9     Base Exc, Cord Art 01/13/2025 -1.1 (L)     O2 Content, Cord Art 01/13/2025 6.6     O2 Hgb, Arterial Cord 01/13/2025 35.5        ASSESSMENT/PLAN:  Postop Day #0 s/p 1LTCS, stable. Continue routine postoperative care.  - Out of bed/ ambulation as tolerated   - Regular diet  - Antiemetics for nausea/vomiting prn  - IV/PO pain meds as needed  - F/u AM labs  - Aleman in place, adequate UOP, continue to monitor  - SCDs for DVT ppx, encourage ambulation as tolerated.    Anticipate discharge on day 2-4    Signature / Title:  Jennifer Hatfield MD, Resident - Ob/Gyn   1/13/2025  4:21 PM

## 2025-01-13 NOTE — ANESTHESIA PROCEDURE NOTES
Spinal Block    Patient location during procedure: OR  Start time: 1/13/2025 10:41 AM  Staffing  Performed by: Stanley Mazariegos CRNA  Authorized by: Jovani Wolf MD    Preanesthetic Checklist  Completed: patient identified, IV checked, site marked, risks and benefits discussed, surgical consent, monitors and equipment checked, pre-op evaluation and timeout performed  Spinal Block  Patient position: sitting  Prep: ChloraPrep  Patient monitoring: heart rate, cardiac monitor, continuous pulse ox and frequent blood pressure checks  Approach: midline  Location: L3-4  Needle  Needle type: Pencan   Needle gauge: 24 G  Needle length: 3.5 in  Assessment  Injection Assessment:  negative aspiration for heme, no paresthesia on injection and positive aspiration for clear CSF.  Post-procedure:  site cleaned  Additional Notes  Unremarkable spinal

## 2025-01-13 NOTE — ASSESSMENT & PLAN NOTE
Subserosal myometrium fibroid located in the anterior fundus region,  measuring 12.4 x 10.9 x 16.4cm with a volume of 1166.9cc. Color doppler  flow was not increased. The appearance was heterogeneous.

## 2025-01-13 NOTE — DISCHARGE INSTRUCTIONS
Self Care After Delivery   AMBULATORY CARE:   The postpartum period is the period of time from delivery to about 6 weeks. During this time you may experience many physical and emotional changes. It is important to understand what is normal and when you need to call your healthcare provider. It is also important to know how to care for yourself during this time.  Call your local emergency number (911 in the [place]) for any of the following:   You see or hear things that are not there, or have thoughts of harming yourself or your baby.     You soak through 1 pad in 15 minutes, have blurry vision, clammy or pale skin, and feel faint.     You faint or lose consciousness.     You have trouble breathing.     You cough up blood.     Your  incision comes apart.     Seek care immediately if:   Your heart is beating faster than usual.     You have a bad headache or changes in your vision.     Your perineal tear, episiotomy site, or  incision is red, swollen, bleeding, or draining pus.     You have severe abdominal pain.     Call your doctor or obstetrician if:   Your leg is painful, red, and larger than usual.     You soak through 1 or more pads in an hour, or pass blood clots larger than a quarter from your vagina.     You have a fever.     You have new or worsening pain in your abdomen or vagina.     You continue to have depression 1 to 2 weeks after you deliver.     You have trouble sleeping.     You have foul-smelling discharge from your vagina.     You have pain or burning when you urinate.     You do not have a bowel movement for 3 days or more.     You have nausea or are vomiting.     You have hard lumps or red streaks over your breasts.     You have cracked nipples or bleed from your nipples.     You have questions or concerns about your condition or care.     Physical changes: The following are normal changes after you give birth:  Pain in the area between your anus and vagina     Breast pain      Constipation or hemorrhoids     Hot or cold flashes     Vaginal bleeding or discharge     Mild to moderate abdominal cramping     Difficulty controlling bowel movements or urine     Emotional changes: A drop in hormone levels after you deliver may cause changes in your emotions. You may feel irritable, sad, or anxious. You may cry easily or for no reason. You may also feel depressed. Depression that continues can be a sign of postpartum depression, a condition that can be treated. Treatment may include talk therapy, medicines, or both. Healthcare providers will ask how you are feeling and if you have any depression. These talks can happen during appointments for your medical care and for your baby's care, such as well child visits. Providers can help you find ways to care for yourself and your baby. Talk to your providers about the following:  When emotional changes or depression started, and if it is getting worse over time     Problems you are having with daily activities, sleep, or caring for your baby     If anything makes you feel worse, or makes you feel better     Feeling that you are not bonding with your baby the way you want     Any problems your baby has with sleeping or feeding     Your baby is fussy or cries a lot     Support you have from friends, family, or others     Breast care for breastfeeding mothers: You may have sore breasts for 3 to 6 days after you give birth. This happens as your milk begins to fill your breasts. You may also have sore breasts if you do not breastfeed frequently. Do the following to care for your breasts:  Apply a moist, warm, compress to your breast as directed. This may help soothe your breasts. Make sure the washcloth is not too hot before you apply it to your breast.     Nurse your baby or pump your milk frequently. This may prevent clogged milk ducts. Ask your healthcare provider how often to nurse or pump.     Massage your breasts as directed. This may help increase  your milk flow. Gently rub your breasts in a circular motion before you breastfeed. You may need to gently squeeze your breast or nipple to help release milk. You can also use a breast pump to help release milk from your breast.     Wash your breasts with warm water only. Do not put soap on your nipples. Soap may cause your nipples to become dry.     Apply lanolin cream to your nipples as directed. Lanolin cream may add moisture to your skin and prevent nipple dryness. Always wash off lanolin cream with warm water before you breastfeed.     Place pads in your bra. Your nipples may leak milk when you are not breastfeeding. You can place pads inside of your bra to help prevent leaking onto your clothing. Ask your healthcare provider where to purchase bra pads.     Get breastfeeding support if needed. Healthcare providers can answer questions about breastfeeding and provide you with support. Ask your healthcare provider who you can contact if you need breastfeeding support.     Breast care for non-breastfeeding mothers: Milk will fill your breasts even if you bottle feed your baby. Do the following to help stop your milk from filling your breasts and causing pain:  Wear a bra with support at all times. A sports bra or a tight-fitting bra will help stop your milk from coming in.     Apply ice on each breast for 15 to 20 minutes every hour or as directed. Use an ice pack, or put crushed ice in a plastic bag. Cover it with a towel before you apply it to your breast. Ice helps your milk ducts shrink.     Keep your breasts away from warm water. Warm water will make it easier for milk to fill your breasts. Stand with your breasts away from warm water in the shower.     Limit how much you touch your breasts. This will prevent them from filling with milk.     Perineum care: Your perineum is the area between your rectum and vagina. It is normal to have swelling and pain in this area after you give birth. If you had an  episiotomy, your healthcare provider may give you special instructions.  Clean your perineum after you use the bathroom. This may prevent infection and help with healing. Use a spray bottle with warm water to clean your perineum. You may also gently spray warm water against your perineum when you urinate. Always wipe front to back.     Take a sitz bath as directed. A sitz bath may help relieve swelling and pain. Fill your bath tub or bucket with water up to your hips and sit in the water. Use cold water for 2 days after you deliver. Then use warm water. Ask your healthcare provider for more information about a sitz bath.     Apply ice packs for the first 24 hours or as directed. Use a plastic glove filled with ice or buy an ice pack. Wrap the ice pack or plastic glove in a small towel or wash cloth. Place the ice pack on your perineum for 20 minutes at a time.     Sit on a donut-shaped pillow. This may relieve pressure on your perineum when you sit.     Use wipes that contain medicine or take pills as directed. Your healthcare provider may tell you to use witch hazel pads. You can place witch hazel pads in the refrigerator before you apply them to your perineum. Your provider may also tell you to take NSAIDs. Ask him or her how often to take pills or use the wipes.     Do not go swimming or take tub baths for 4 to 6 weeks or as directed. This will help prevent an infection in your vagina or uterus.     Bowel and bladder care: It may take 3 to 5 days to have a bowel movement after you deliver your baby. You can do the following to prevent or manage constipation, and get control of your bowel or bladder:  Take stool softeners as directed. A stool softener is medicine that will make your bowel movements softer. This may prevent or relieve constipation. A stool softener may also make bowel movements less painful.     Drink plenty of liquids. Ask how much liquid to drink each day and which liquids are best for you.  Liquids may help prevent constipation.     Eat foods high in fiber. Examples include fruits, vegetables, grains, beans, and lentils. Ask your healthcare provider how much fiber you need each day. Fiber may prevent constipation.          Do Kegel exercises as directed. Kegel exercises will help strengthen the muscles that control bowel movements and urination. Ask your healthcare provider for more information on Kegel exercises.     Apply cold compresses or medicine to hemorrhoids as directed. This may relieve swelling and pain. Your healthcare provider may tell you to apply ice or wipes that contain medicine to your hemorrhoids. He or she may also tell you to use a sitz bath. Ask your provider for more information on how to manage hemorrhoids.     Nutrition: Good nutrition is important in the postpartum period. It will help you return to a healthy weight, increase your energy levels, and prevent constipation. It will also help you get enough nutrients and calories if you are going to breastfeed your baby.  Eat a variety of healthy foods. Healthy foods include fruits, vegetables, whole-grain breads, low-fat dairy products, beans, lean meats, and fish. You may need 500 to 700 extra calories each day if you breastfeed your baby. You may also need extra protein.          Limit foods with added sugar and high amounts of fat. These foods are high in calories and low in healthy nutrients. Read food labels so you know how much sugar and fat is in the food you want to eat.     Drink 8 to 10 glasses of water per day. Water will help you make plenty of milk for your baby. It will also help prevent constipation. Drink a glass of water every time you breastfeed your baby.     Take vitamins as directed. Ask your healthcare provider what vitamins you need.     Limit caffeine and alcohol if you are breastfeeding. Caffeine and alcohol can get into your breast milk. Caffeine and alcohol can make your baby fussy. They can also  interfere with your baby's sleep. Ask your healthcare provider if you can drink alcohol or caffeine.     Rest and sleep: You may feel very tired in the postpartum period. Enough sleep will help you heal and give you energy to care for your baby. The following may help you get sleep and rest:  Nap when your baby naps. Your baby may nap several times during the day. Get rest during this time.     Limit visitors. Many people may want to see you and your baby. Ask friends or family to visit on different days. This will give you time to rest.     Do not plan too much for one day. Put off household chores so that you have time to rest. Gradually do more each day.     Ask for help from family, friends, or neighbors. Ask them to help you with laundry, cleaning, or errands. Also ask someone to watch the baby while you take a nap or relax. Ask your partner to help with the care of your baby. Pump some of your breast milk so your partner can feed your baby during the night.     Exercise after delivery: Wait until your healthcare provider says it is okay to exercise. Exercise can help you lose weight, increase your energy levels, and manage your mood. It can also prevent constipation and blood clots. Start with gentle exercises such as walking. Do more as you have more energy. You may need to avoid abdominal exercises for 1 to 2 weeks after you deliver. Talk to your healthcare provider about an exercise plan that is right for you.     Sexual activity after delivery:   Do not have sex until your healthcare provider says it is okay. You may need to wait 4 to 6 weeks before you have sex. This may prevent infection and allow time to heal.     Your menstrual cycle may begin as soon as 3 weeks after you deliver. Your period may be delayed if you breastfeed your baby. You can become pregnant before you get your first postpartum period. Talk to your healthcare provider about birth control that is right for you. Some types of birth  control are not safe during breastfeeding.     For support and more information: Join a support group for new mothers. Ask for help from family and friends with chores, errands, and care of your baby.  Office of Women's Health,  Department of Health and Human Services  [address] [place] [address]  [address] [place] [address]  Phone: [phone] 301 [phone]  Web Address: www.womenshealth.gov  [facility]partum [facility]  [address] [place] [address]  Web Address: [email or web address]  Follow up with your doctor or obstetrician as directed: You will need to follow up within 2 to 6 weeks of delivery. Write down your questions so you remember to ask them at your visits.  © Copyright [employer] 2020 Information is for End User's use only and may not be sold, redistributed or otherwise used for commercial purposes. All illustrations and images included in CareNotes® are the copyrighted property of [employer] or [employer]  The above information is an  only. It is not intended as medical advice for individual conditions or treatments. Talk to your doctor, nurse or pharmacist before following any medical regimen to see if it is safe and effective for you.

## 2025-01-13 NOTE — ASSESSMENT & PLAN NOTE
Routine postpartum care  Encourage ambulation  Encourage familial bonding  Lactation support as needed  Pain: Motrin/Tylenol around the clock, oxycodone if needed  Postpartum Contraceptive plan: Status post tubal ligation  Pre-delivery Hgb 11.2 --> 8.3  Aleman catheter: Out, she has had 2 voids 1 of 150 cc in the second with 500 cc  DVT Ppx: ambulation, SCDs

## 2025-01-13 NOTE — DISCHARGE SUMMARY
Obstetrics Discharge Summary  Nicole Forte 39 y.o. female MRN: [id number]  Unit/Bed#: -01 Encounter: [id number]    Admission Date: 2025   Discharge Attending: Trish Smith MD  Discharge Diagnosis:   S/P tubal ligation  Assessment & Plan  Performed after LTCS         Delivery Method: , Low Transverse   Delivery Date and Time: 2025 11:07 AM  Delivery Attending: Tata Kan MD    Anesthesia: spinal    Hospital course:   Nicole Forte is a G2-P2 who underwent a primary low-transverse  and bilateral salpingectomy due to fetal malpresentation.She delivered a viable female  37w1d. 's birth weight was 5lbs 15.2oz; Apgars were 9 (1 min) and  9 (5 min). Placenta delivered manually without difficulty.   was delivered without issue. Patient tolerated the procedure well and was transferred to recovery in stable condition.     Her post-delivery course was uncomplicated. Her preoperative hemoglobin was 11.2g/dL, postoperative was 8.3. Her postoperative pain was well controlled with oral analgesics.. Mom's blood type is A positive. RhoGAM was not indicated.      On day of discharge, she was ambulating and able to reasonably perform all ADLs. She was voiding and had appropriate bowel function. Pain was well controlled. She was discharged home on post-op day #2 without complications. Patient was instructed to follow up with her OBGYN as an outpatient and was given appropriate warnings to call provider if she develops signs of infection or uncontrolled pain.    Postpartum Contraception: sterilization - b/l salpingectomy    Complications: none apparent    Condition at discharge: good     Provisions for Follow-Up Care:  Please see after visit summary for information related to follow-up care and any pertinent home health orders.      Disposition: Home    Discharge Medications:   Please see AVS for a complete list of discharge medications.     Case and note reviewed  agree.

## 2025-01-13 NOTE — ANESTHESIA POSTPROCEDURE EVALUATION
Post-Op Assessment Note    CV Status:  Stable  Pain Score: 5    Pain management: satisfactory to patient       Mental Status:  Alert and awake   Hydration Status:  Stable   PONV Controlled:  Controlled   Airway Patency:  Patent and adequate     Post Op Vitals Reviewed: Yes    No anethesia notable event occurred.    Staff: CRNA           Last Filed PACU Vitals:  Vitals Value Taken Time   Temp 97    Pulse 79 01/13/25 1238   /58 01/13/25 1238   Resp 12    SpO2 100 % 01/13/25 1238   Vitals shown include unfiled device data.

## 2025-01-13 NOTE — PLAN OF CARE
Problem: PAIN - ADULT  Goal: Verbalizes/displays adequate comfort level or baseline comfort level  Description: Interventions:  - Encourage patient to monitor pain and request assistance  - Assess pain using appropriate pain scale  - Administer analgesics based on type and severity of pain and evaluate response  - Implement non-pharmacological measures as appropriate and evaluate response  - Consider cultural and social influences on pain and pain management  - Notify physician/advanced practitioner if interventions unsuccessful or patient reports new pain  Outcome: Progressing     Problem: INFECTION - ADULT  Goal: Absence or prevention of progression during hospitalization  Description: INTERVENTIONS:  - Assess and monitor for signs and symptoms of infection  - Monitor lab/diagnostic results  - Monitor all insertion sites, i.e. indwelling lines, tubes, and drains  - Monitor endotracheal if appropriate and nasal secretions for changes in amount and color  - [place] appropriate cooling/warming therapies per order  - Administer medications as ordered  - Instruct and encourage patient and family to use good hand hygiene technique  - Identify and instruct in appropriate isolation precautions for identified infection/condition  Outcome: Progressing  Goal: Absence of fever/infection during neutropenic period  Description: INTERVENTIONS:  - Monitor WBC    Outcome: Progressing     Problem: SAFETY ADULT  Goal: Patient will remain free of falls  Description: INTERVENTIONS:  - Educate patient/family on patient safety including physical limitations  - Instruct patient to call for assistance with activity   - Consult OT/PT to assist with strengthening/mobility   - Keep Call bell within reach  - Keep bed low and locked with side rails adjusted as appropriate  - Keep care items and personal belongings within reach  - Initiate and maintain comfort rounds  - Make Fall Risk Sign visible to staff  - Offer Toileting  -  Initiate/Maintain alarm  - Obtain necessary fall risk management equipment:   - Apply yellow socks and bracelet for high fall risk patients  - Consider moving patient to room near nurses station  Outcome: Progressing  Goal: Maintain or return to baseline ADL function  Description: INTERVENTIONS:  -  Assess patient's ability to carry out ADLs; assess patient's baseline for ADL function and identify physical deficits which impact ability to perform ADLs (bathing, care of mouth/teeth, toileting, grooming, dressing, etc.)  - Assess/evaluate cause of self-care deficits   - Assess range of motion  - Assess patient's mobility; develop plan if impaired  - Assess patient's need for assistive devices and provide as appropriate  - Encourage maximum independence but intervene and supervise when necessary  - Involve family in performance of ADLs  - Assess for home care needs following discharge   - Consider OT consult to assist with ADL evaluation and planning for discharge  - Provide patient education as appropriate  Outcome: Progressing  Goal: Maintains/Returns to pre admission functional level  Description: INTERVENTIONS:  - Perform AM-PAC 6 Click Basic Mobility/ Daily Activity assessment daily.  - Set and communicate daily mobility goal to care team and patient/family/caregiver.   - Collaborate with rehabilitation services on mobility goals if consulted  - Perform Range of Motion   - Reposition patient e  - Dangle patient   - Stand patient   - Ambulate patient   - Out of bed to chair   - Out of bed for meals   - Out of bed for toileting  - Record patient progress and toleration of activity level   Outcome: Progressing     Problem: DISCHARGE PLANNING  Goal: Discharge to home or other facility with appropriate resources  Description: INTERVENTIONS:  - Identify barriers to discharge w/patient and caregiver  - Arrange for needed discharge resources and transportation as appropriate  - Identify discharge learning needs (meds,  wound care, etc.)  - Arrange for interpretive services to assist at discharge as needed  - Refer to Case Management Department for coordinating discharge planning if the patient needs post-hospital services based on physician/advanced practitioner order or complex needs related to functional status, cognitive ability, or social support system  Outcome: Progressing     Problem: Knowledge Deficit  Goal: Patient/family/caregiver demonstrates understanding of disease process, treatment plan, medications, and discharge instructions  Description: Complete learning assessment and assess knowledge base.  Interventions:  - Provide teaching at level of understanding  - Provide teaching via preferred learning methods  Outcome: Progressing

## 2025-01-13 NOTE — ANESTHESIA PREPROCEDURE EVALUATION
Procedure:   SECTION () (Uterus)    Relevant Problems   GYN   (+) 36 weeks gestation of pregnancy   (+) Multigravida of advanced maternal age in second trimester        Physical Exam    Airway    Mallampati score: II  TM Distance: >3 FB  Neck ROM: full     Dental   No notable dental hx     Cardiovascular  Cardiovascular exam normal    Pulmonary  Pulmonary exam normal     Other Findings  post-pubertal.      Anesthesia Plan  ASA Score- 2     Anesthesia Type- spinal with ASA Monitors.         Additional Monitors:     Airway Plan:            Plan Factors-Exercise tolerance (METS): >4 METS.    Chart reviewed.   Existing labs reviewed. Patient summary reviewed.    Patient is not a current smoker.              Induction-     Postoperative Plan-     Perioperative Resuscitation Plan - Level 1 - Full Code.       Informed Consent- Anesthetic plan and risks discussed with patient and spouse.  I personally reviewed this patient with the CRNA. Discussed and agreed on the Anesthesia Plan with the CRNA..

## 2025-01-14 PROBLEM — D62 ACUTE BLOOD LOSS ANEMIA: Status: ACTIVE | Noted: 2025-01-14

## 2025-01-14 PROBLEM — O09.522 MULTIGRAVIDA OF ADVANCED MATERNAL AGE IN SECOND TRIMESTER: Status: RESOLVED | Noted: 2024-07-19 | Resolved: 2025-01-14

## 2025-01-14 PROBLEM — Z98.51 S/P TUBAL LIGATION: Status: ACTIVE | Noted: 2025-01-14

## 2025-01-14 LAB
ERYTHROCYTE [DISTWIDTH] IN BLOOD BY AUTOMATED COUNT: 13.8 % (ref 11.6–15.1)
HCT VFR BLD AUTO: 24.6 % (ref 34.8–46.1)
HGB BLD-MCNC: 8.3 G/DL (ref 11.5–15.4)
MCH RBC QN AUTO: 30 PG (ref 26.8–34.3)
MCHC RBC AUTO-ENTMCNC: 33.7 G/DL (ref 31.4–37.4)
MCV RBC AUTO: 89 FL (ref 82–98)
PLATELET # BLD AUTO: 197 THOUSANDS/UL (ref 149–390)
PMV BLD AUTO: 10 FL (ref 8.9–12.7)
RBC # BLD AUTO: 2.77 MILLION/UL (ref 3.81–5.12)
WBC # BLD AUTO: 8.48 THOUSAND/UL (ref 4.31–10.16)

## 2025-01-14 PROCEDURE — 99024 POSTOP FOLLOW-UP VISIT: CPT | Performed by: STUDENT IN AN ORGANIZED HEALTH CARE EDUCATION/TRAINING PROGRAM

## 2025-01-14 PROCEDURE — 85027 COMPLETE CBC AUTOMATED: CPT

## 2025-01-14 RX ORDER — OXYCODONE HYDROCHLORIDE 10 MG/1
10 TABLET ORAL EVERY 4 HOURS PRN
Refills: 0 | Status: DISCONTINUED | OUTPATIENT
Start: 2025-01-14 | End: 2025-01-15 | Stop reason: HOSPADM

## 2025-01-14 RX ORDER — OXYCODONE HYDROCHLORIDE 5 MG/1
5 TABLET ORAL EVERY 4 HOURS PRN
Refills: 0 | Status: DISCONTINUED | OUTPATIENT
Start: 2025-01-14 | End: 2025-01-14

## 2025-01-14 RX ORDER — HYDROMORPHONE HCL/PF 1 MG/ML
0.5 SYRINGE (ML) INJECTION ONCE
Status: COMPLETED | OUTPATIENT
Start: 2025-01-14 | End: 2025-01-14

## 2025-01-14 RX ORDER — DOCUSATE SODIUM 100 MG/1
100 CAPSULE, LIQUID FILLED ORAL 2 TIMES DAILY
Status: DISCONTINUED | OUTPATIENT
Start: 2025-01-14 | End: 2025-01-15 | Stop reason: HOSPADM

## 2025-01-14 RX ORDER — OXYCODONE HYDROCHLORIDE 5 MG/1
5 TABLET ORAL EVERY 4 HOURS PRN
Refills: 0 | Status: DISCONTINUED | OUTPATIENT
Start: 2025-01-14 | End: 2025-01-15 | Stop reason: HOSPADM

## 2025-01-14 RX ADMIN — KETOROLAC TROMETHAMINE 15 MG: 30 INJECTION, SOLUTION INTRAMUSCULAR; INTRAVENOUS at 11:02

## 2025-01-14 RX ADMIN — SIMETHICONE 80 MG: 80 TABLET, CHEWABLE ORAL at 22:33

## 2025-01-14 RX ADMIN — Medication 1 TABLET: at 08:46

## 2025-01-14 RX ADMIN — IBUPROFEN 800 MG: 400 TABLET, FILM COATED ORAL at 22:30

## 2025-01-14 RX ADMIN — KETOROLAC TROMETHAMINE 15 MG: 30 INJECTION, SOLUTION INTRAMUSCULAR; INTRAVENOUS at 04:39

## 2025-01-14 RX ADMIN — MAGNESIUM 64 MG (MAGNESIUM CHLORIDE) TABLET,DELAYED RELEASE 128 MG: at 08:47

## 2025-01-14 RX ADMIN — KETOROLAC TROMETHAMINE 15 MG: 30 INJECTION, SOLUTION INTRAMUSCULAR; INTRAVENOUS at 17:08

## 2025-01-14 RX ADMIN — SIMETHICONE 80 MG: 80 TABLET, CHEWABLE ORAL at 06:14

## 2025-01-14 RX ADMIN — ACETAMINOPHEN 975 MG: 325 TABLET, FILM COATED ORAL at 13:40

## 2025-01-14 RX ADMIN — OMEGA-3 FATTY ACIDS CAP 1000 MG 1000 MG: 1000 CAP at 08:47

## 2025-01-14 RX ADMIN — ACETAMINOPHEN 975 MG: 325 TABLET, FILM COATED ORAL at 00:01

## 2025-01-14 RX ADMIN — IRON SUCROSE 200 MG: 20 INJECTION, SOLUTION INTRAVENOUS at 08:46

## 2025-01-14 RX ADMIN — DOCUSATE SODIUM 100 MG: 100 CAPSULE, LIQUID FILLED ORAL at 17:08

## 2025-01-14 RX ADMIN — ACETAMINOPHEN 975 MG: 325 TABLET, FILM COATED ORAL at 06:11

## 2025-01-14 RX ADMIN — ACETAMINOPHEN 975 MG: 325 TABLET, FILM COATED ORAL at 19:14

## 2025-01-14 RX ADMIN — OXYCODONE HYDROCHLORIDE 10 MG: 10 TABLET ORAL at 19:14

## 2025-01-14 RX ADMIN — HYDROMORPHONE HYDROCHLORIDE 0.5 MG: 1 INJECTION, SOLUTION INTRAMUSCULAR; INTRAVENOUS; SUBCUTANEOUS at 09:27

## 2025-01-14 RX ADMIN — SIMETHICONE 80 MG: 80 TABLET, CHEWABLE ORAL at 13:45

## 2025-01-14 RX ADMIN — PANTOPRAZOLE SODIUM 20 MG: 20 TABLET, DELAYED RELEASE ORAL at 06:12

## 2025-01-14 RX ADMIN — SIMETHICONE 80 MG: 80 TABLET, CHEWABLE ORAL at 09:48

## 2025-01-14 NOTE — LACTATION NOTE
This note was copied from a baby's chart.  CONSULT - LACTATION  Baby Girl Forte (Kelly) 1 days female MRN: [id number]    [facility]URS[facility] Room / Bed: (N)/(N) Encounter: [id number]    Maternal Information     MOTHER:  Nicole Forte  Maternal Age: 39 y.o.  OB History: # 1 - Date: 17, Sex: Male, Weight: 3175 g (7 lb), GA: 40w0d, Type: Vaginal, Spontaneous, Apgar1: None, Apgar5: None, Living: Living, Birth Comments: None    # 2 - Date: 25, Sex: Female, Weight: 2700 g (5 lb 15.2 oz), GA: 37w1d, Type: , Low Transverse, Apgar1: 9, Apgar5: 9, Living: Living, Birth Comments: None   Previous breast reduction surgery? No; Breast augmentation in     Lactation history:   Has patient previously breast fed:     How long had patient previously breast fed:     Previous breast feeding complications:       Past Surgical History:   Procedure Laterality Date    INCISION AND DRAINAGE OF WOUND Left 2021    Procedure: INCISION AND DRAINAGE (I&D) HEAD/FACE and extraction of tooth #18;  Surgeon: Jourdan Perkins DMD;  Location: [facility] Main OR;  Service: Maxillofacial    VT  DELIVERY ONLY N/A 2025    Procedure:  SECTION ();  Surgeon: Tata Iniguez MD;  Location: [facility] LD;  Service: Obstetrics    SALPINGECTOMY N/A 2025    Procedure: SALPINGECTOMY;  Surgeon: Tata Iniguez MD;  Location: [facility] LD;  Service: Obstetrics       Birth information:  YOB: 2024   Time of birth: 11:07 AM   Sex: female   Delivery type: , Low Transverse   Birth Weight: 2700 g (5 lb 15.2 oz)   Percent of Weight Change: -2%     Gestational Age: 37w1d   [unfilled]    Reason for Consult:    Reason for Consult: Initial assessment (ext) - 20 min, Latch Assess (ext) - 20 min, Discharge (routine) - 10 min ; difficult latch on 2nd day    Risk Factors:  breast augmentation; hx of low supply; baby is fussy       Breast and nipple assessment:      Bilateral round breasts with tan areolas and everted nipples    OB Lactation Tools:         Breast Pump:       S2 at home    Sidney Assessment: receded chin and high, narrow palate; ridged; anterior suck reflex    General Appearance:  Alert, active, no distress  Head:  asymmetrical with slight bulge on the right due to breech presentation                                               Mouth:  Palate intact; slight, recessed chin; small gape; high, narrow palate, ridged palate, anterior suck reflex.; upon digital suck exam, baby keeps tongue posterior. Cupping noted when suck reflex is stimulated.    Large bowel movement during consult.    Feeding assessment: latch difficulty (due to positioning)     25 1000   LATCH Documentation   Latch 1   Audible Swallowing 2   Type of Nipple 2   Comfort (Breast/Nipple) 2   Hold (Positioning) 1   LATCH Score 8   Having latch problems? Yes   Position(s) Used Cross Cradle;Cradle       HazelBaker:     .Hazelbaker Assessment for Lingual Frenulum Function    Appearance Items Function Items   Appearance of tongue when lifted  2: Round or square   Lateralization  2: Complete   Elasticity of frenulum  0: Little or no elasticity   Lift of tongue  2: Tip to mid-mouth     Length of lingual frenulum when tongue lifted  lingual frenulum length: 2: > 1cm     Extension of tongue  0: Neither of the above, or anterior or mid-tongu humps   Attachment of lingual frenulum to tongue  2: Posterior to tip   Spread of anterior tongue  1: Moderate of partial   Attachment of lingual frenulum to inferior alveolar ridge  2: Attached to floor of mouth or well below ridge Cupping  0: Poor or no cup   Ankyloglossia Grading:  Class I: mild, 12-16 mm  Class II: moderate, 8-11 mm  Class III: severe, 3-7 mm  ClassIV: complete, less than 3 mm Peristalsis  0: None or reverse motion       SCORE:    Appearance: tongue remains elevated in mouth; spitty after c/s and sneezing out amniotic fluid  "(<8=ankyloglossia)  Function: mom states baby has been latching well until 3 am. Meeting output goals. Baby currently gives 3 sucks, then pops off the breast. May be fussy due to cluster feeding. Enc. S2S. Baby appears sleepy. Family to rest. (<11=ankyloglossia) Snapback  0: Frequent or with each suck                 Feeding recommendations:  breast feed on demand. Mom States baby has been latching well since birth. At 3 am today, baby began to be fussy and has not provided a long feeding.     Mom is attempting to latch in football on the right and cradle on the left. Shallow latch noted due to alignment of nipple to the mower mandible. Demonstration and teach back of latching techniques.     Demonstration of laid back on the left breast. Ed. On alignment of nipple to nose, chin to breast, compression of the breast in a 'c\" hold to achieve a deep latch.     Active, coordinated sucks. After a 3-5 suckling burst, baby pulls off the breast. Demonstrated wants to sleep.    Ed. On hand expression, how to increase milk supply and what to expect during cluster feeding.     Parents want to rest. Enc. S2s and to call for next really feeding cue.    Mom has an s1    Rsb/dc reviewed and left at bedside    Enc. To call lactation.    Education on creating a snug hold of your infant to the breast by verifying the infant's cheek is touching the breast, your infant's chin is deep into the breast tissue, your infant's arms are \"hugging\" the breast, and your infant's lips are flanged on the areola. Bring infant to the breast, not your breast to the infant. Latch should feel like a tugging sensation on the nipple.    Education on positioning and alignment. Mom is encouraged to:     - Bring baby up to the breast (use of pillows to elevate so baby's torso is against mom's breasts)   - Skin to skin for feedings with top hand exposed to show signs of satiation   - Chin deep into breast tissue (make baby look up to the nipple)   - nose " "aligned to the nipple   -Wait for wide gape, drag chin on the breast so nipple is aimed at the upper, back palate  - Cheek should be touching breast   - Deep, firm hold of baby with ear, shoulder, hip alignment    Discussed 2nd night syndrome and ways to calm infant. Hand out given. Information on hand expression given. Discussed benefits of knowing how to manually express breast including stimulating milk supply, softening nipple for latch and evacuating breast in the event of engorgement.    Demonstrated with teach back breast compressions during a feeding to increase milk transfer and stimulate suckling after a breathing/muscle break.     Milk Supply:   - Allow for non-nutritive suck at the breast to stimulate supply   - Allow for skin to skin during and after each breastfeeding session   - Use massage, heat, and hand expression prior to feedings to assist with deep latch   - Increase pumping sessions and pump after every feeding    Nurse on demand: when baby gives hunger cues; when your breasts feel full, or at least every 3 hours during the day and every 5 hours at night counting from the beginning of one feeding to the beginning of the next; which ever comes first. When sucking and swallowing slow, gently compress the breast to restart flow. If active suck-swallow does not restart, gently remove the baby and offer the other breast; offering up to \"four\" breasts per feeding.      (Scan QR code for [employer] - positions)   Review Milkmob on youtube or scan QR code for Milk[employer]ob video      Milk Mob        [employer] - positions    Spectra Education on turning on the pump, press the 3 wavy lines to place pump on stimulation mode (high cycle, low vacuum) Set vacuum to comfort with light suction. After 3 min, press 3 wavy lines and change setting to Expression mode (low Cycle, High vacuum) Vacuum setting should not pinch, only tug the nipple. Now pump is set. Next time mom pumps, " will only need to turn on pump and press 3 wavy line button to change cycle three times in a pumping session.       Christy Hume, MA 1/14/2025 10:05 AM

## 2025-01-14 NOTE — ASSESSMENT & PLAN NOTE
Routine postpartum care  Encourage ambulation  Encourage familial bonding  Lactation support as needed  Pain: Motrin/Tylenol around the clock, oxycodone if needed  Postpartum Contraceptive plan: Status post tubal ligation  Pre-delivery Hgb 11.2 --> 8.3, s/p venofer   S/p lynn catheter, voiding without difficulty   DVT Ppx: ambulation, SCDs

## 2025-01-14 NOTE — PLAN OF CARE
Problem: PAIN - ADULT  Goal: Verbalizes/displays adequate comfort level or baseline comfort level  Description: Interventions:  - Encourage patient to monitor pain and request assistance  - Assess pain using appropriate pain scale  - Administer analgesics based on type and severity of pain and evaluate response  - Implement non-pharmacological measures as appropriate and evaluate response  - Consider cultural and social influences on pain and pain management  - Notify physician/advanced practitioner if interventions unsuccessful or patient reports new pain  Outcome: Progressing     Problem: INFECTION - ADULT  Goal: Absence or prevention of progression during hospitalization  Description: INTERVENTIONS:  - Assess and monitor for signs and symptoms of infection  - Monitor lab/diagnostic results  - Monitor all insertion sites, i.e. indwelling lines, tubes, and drains  - [place] appropriate cooling/warming therapies per order  - Administer medications as ordered  - Instruct and encourage patient and family to use good hand hygiene technique  - Identify and instruct in appropriate isolation precautions for identified infection/condition  Outcome: Progressing  Goal: Absence of fever/infection during neutropenic period  Description: INTERVENTIONS:  - Monitor WBC    Outcome: Progressing     Problem: SAFETY ADULT  Goal: Patient will remain free of falls  Description: INTERVENTIONS:  - Educate patient/family on patient safety including physical limitations  - Instruct patient to call for assistance with activity   - Consult OT/PT to assist with strengthening/mobility   - Keep Call bell within reach  - Keep bed low and locked with side rails adjusted as appropriate  - Keep care items and personal belongings within reach  - Initiate and maintain comfort rounds  - Make Fall Risk Sign visible to staff  - Offer Toileting every 2 Hours  - Initiate/Maintain alarm  - Obtain necessary fall risk management equipment:   - Apply  yellow socks and bracelet for high fall risk patients  - Consider moving patient to room near nurses station  Outcome: Progressing  Goal: Maintain or return to baseline ADL function  Description: INTERVENTIONS:  -  Assess patient's ability to carry out ADLs; assess patient's baseline for ADL function and identify physical deficits which impact ability to perform ADLs (bathing, care of mouth/teeth, toileting, grooming, dressing, etc.)  - Assess/evaluate cause of self-care deficits   - Assess range of motion  - Assess patient's mobility; develop plan if impaired  - Assess patient's need for assistive devices and provide as appropriate  - Encourage maximum independence but intervene and supervise when necessary  - Involve family in performance of ADLs  - Assess for home care needs following discharge   - Consider OT consult to assist with ADL evaluation and planning for discharge  - Provide patient education as appropriate  Outcome: Progressing  Goal: Maintains/Returns to pre admission functional level  Description: INTERVENTIONS:  - Perform AM-PAC 6 Click Basic Mobility/ Daily Activity assessment daily.  - Set and communicate daily mobility goal to care team and patient/family/caregiver.   - Collaborate with rehabilitation services on mobility goals if consulted  - Perform Range of Motion   - Reposition patient   - Dangle patient   - Stand patient   - Ambulate patient   - Out of bed to chair   - Out of bed for meals   - Out of bed for toileting  - Record patient progress and toleration of activity level   Outcome: Progressing     Problem: DISCHARGE PLANNING  Goal: Discharge to home or other facility with appropriate resources  Description: INTERVENTIONS:  - Identify barriers to discharge w/patient and caregiver  - Arrange for needed discharge resources and transportation as appropriate  - Identify discharge learning needs (meds, wound care, etc.)  - Arrange for interpretive services to assist at discharge as needed  -  Refer to Case Management Department for coordinating discharge planning if the patient needs post-hospital services based on physician/advanced practitioner order or complex needs related to functional status, cognitive ability, or social support system  Outcome: Progressing     Problem: Knowledge Deficit  Goal: Patient/family/caregiver demonstrates understanding of disease process, treatment plan, medications, and discharge instructions  Description: Complete learning assessment and assess knowledge base.  Interventions:  - Provide teaching at level of understanding  - Provide teaching via preferred learning methods  Outcome: Progressing     Problem: POSTPARTUM  Goal: Experiences normal postpartum course  Description: INTERVENTIONS:  - Monitor maternal vital signs  - Assess uterine involution and lochia  Outcome: Progressing  Goal: Appropriate maternal -  bonding  Description: INTERVENTIONS:  - Identify family support  - Assess for appropriate maternal/infant bonding   -Encourage maternal/infant bonding opportunities  - Referral to  or  as needed  Outcome: Progressing  Goal: Establishment of infant feeding pattern  Description: INTERVENTIONS:  - Assess breast/bottle feeding  - Refer to lactation as needed  Outcome: Progressing  Goal: Incision(s), wounds(s) or drain site(s) healing without S/S of infection  Description: INTERVENTIONS  - Assess and document dressing, incision, wound bed, drain sites and surrounding tissue  - Provide patient and family education  - Perform skin care/dressing changes   Outcome: Progressing

## 2025-01-14 NOTE — LACTATION NOTE
This note was copied from a baby's chart.  Follow up Lactation:  Mom called for help with latching. Mom states baby is demonstrating feeding cues.     Brought baby up to both breasts in a laid-back and side-lying hold on both breasts.    Some active, coordinated sucks noted. Before baby wants to sleep at the breast. Enc. And reassurance provided during cluster feeding. Baby latched to each breast for a short amt. Of time - approx. 10 min. With demonstration and teach back of suck reflex, alignment, and compression of the breast.     Ed. On baby and me for outpatient support.    Mom will reach out again for next latch.     Mom is encouraged to place baby skin to skin for feedings. Skin to skin education provided for baby placement on mother's chest, baby only in diaper, blankets below shoulders on baby's back. Skin to skin is encouraged to continue at home for feedings and between feedings.    Discussed 2nd night syndrome and ways to calm infant. Hand out given. Information on hand expression given. Discussed benefits of knowing how to manually express breast including stimulating milk supply, softening nipple for latch and evacuating breast in the event of engorgement.    Demonstrated with teach back breast compressions during a feeding to increase milk transfer and stimulate suckling after a breathing/muscle break.     Encouraged parents to call for assistance, questions, and concerns about breastfeeding.

## 2025-01-14 NOTE — PROGRESS NOTES
Progress Note - OB/GYN   Name: Nicole Forte 39 y.o. female I MRN: [id number]  Unit/Bed#: -01 I Date of Admission: 2025   Date of Service: 2025 I Hospital Day: 1     Assessment & Plan  S/P primary low transverse   Routine postpartum care  Encourage ambulation  Encourage familial bonding  Lactation support as needed  Pain: Motrin/Tylenol around the clock, oxycodone if needed  Postpartum Contraceptive plan: Status post tubal ligation  Pre-delivery Hgb 11.2 --> 8.3  Aleman catheter: Out, she has had 2 voids 1 of 150 cc in the second with 500 cc  DVT Ppx: ambulation, SCDs  Uterine fibroid complicating  care, baby not yet delivered in third trimester  Subserosal myometrium fibroid located in the anterior fundus region,  measuring 12.4 x 10.9 x 16.4cm with a volume of 1166.9cc. Color doppler  flow was not increased. The appearance was heterogeneous.  Multigravida of advanced maternal age in second trimester (Resolved: 2025)    Breech presentation (Resolved: 2025)    Acute blood loss anemia  The patient had a postpartum hemorrhage of 1.5 L which required extra Pitocin and TXA this morning her hemoglobin is down to 8.3 from 11.2 on admission  Plan  -Ordered Venofer to be given inpatient this morning  -Recommended continuing prenatal vitamins with iron outpatient    Nicole Forte is a patient of: [facility] . She is PPD#1 s/p  primary  section, low transverse incision,  indication: Fetal malpresentation, and tubal ligation   Recovering well and is stable.     Disposition    - Anticipate discharge home on PPD# 3-4  Barriers to discharge: None     Jennifer Hatfield MD  OBGYN PGY-1  2025 6:18 AM    Subjective/Objective     Chief Complaint: Postpartum State   Subjective:    Nicole Forte is PPD#1 s/p Primary  section with Tubal Ligation. She has no current complaints and had no acute events overnight. Pain is well controlled. Patient is currently voiding. She is  "ambulating. Patient is currently passing flatus and has had no bowel movement. She is tolerating PO, and denies nausea or vomitting. Patient denies fever, chills, chest pain, shortness of breath, or calf tenderness. Lochia is normal. She is Breastfeeding. She is recovering well and is stable.     Vitals:   /63 (BP Location: Left arm)   Pulse 55   Temp 97.9 °F (36.6 °C) (Oral)   Resp 14   Ht 5' 7\" (1.702 m)   Wt 76.2 kg (168 lb)   LMP 04/18/2024   SpO2 97%   Breastfeeding Yes   BMI 26.31 kg/m²     Intake/Output Summary (Last 24 hours) at 1/14/2025 0618  Last data filed at 1/14/2025 0000  Gross per 24 hour   Intake 3250 ml   Output 2693 ml   Net 557 ml       Physical Exam  GEN: Nicole Forte appears well, alert and oriented x 3, pleasant and cooperative   CARDIO: Regular Rate  RESP:  non-labored breathing  ABDOMEN: soft, no tenderness, firm fundus at umbilicus, Incision C/D/I  EXTREMITIES: Non tender, trace edema appropriate for post-pregnancy, no erythema    Labs:   Recent Results (from the past 72 hours)   Type and screen    Collection Time: 01/13/25  8:30 AM   Result Value Ref Range    ABO Grouping A     Rh Factor Positive     Antibody Screen Negative     Specimen Expiration Date 20250116    CBC    Collection Time: 01/13/25  8:30 AM   Result Value Ref Range    WBC 6.06 4.31 - 10.16 Thousand/uL    RBC 3.80 (L) 3.81 - 5.12 Million/uL    Hemoglobin 11.2 (L) 11.5 - 15.4 g/dL    Hematocrit 33.8 (L) 34.8 - 46.1 %    MCV 89 82 - 98 fL    MCH 29.5 26.8 - 34.3 pg    MCHC 33.1 31.4 - 37.4 g/dL    RDW 14.2 11.6 - 15.1 %    Platelets 254 149 - 390 Thousands/uL    MPV 9.8 8.9 - 12.7 fL   L&D GREEN / YELLOW ON HOLD    Collection Time: 01/13/25  8:30 AM   Result Value Ref Range    Extra Tube Hold for add-ons.    RPR-Syphilis Screening (Total Syphilis IGG/IGM)    Collection Time: 01/13/25  8:30 AM   Result Value Ref Range    Syphilis Total Antibody Non-reactive Non-Reactive   CORD, Blood gas, venous    Collection " Time: 01/13/25 11:09 AM   Result Value Ref Range    pH, Cord Victor M 7.432 7.190 - 7.490    pCO2, Cord Victor M 38.6 27.0 - 43.0 mm HG    pO2, Cord Victor M 26.8 15.0 - 45.0 mm HG    HCO3, Cord Victor M 25.2 12.2 - 28.6 mmol/L    Base Exc, Cord Victor M 1.0 1.0 - 9.0 mmol/L    O2 Cont, Cord Victor M 13.5 mL/dL    O2 HGB,VENOUS CORD 70.8 %   CORD, Blood gas, arterial    Collection Time: 01/13/25 11:09 AM   Result Value Ref Range    pH, Cord Art 7.347 7.230 - 7.430    pCO2, Cord Art 46.4 30.0 - 60.0    pO2, Cord Art 16.5 5.0 - 25.0 mm HG    HCO3, Cord Art 24.9 17.3 - 27.3 mmol/L    Base Exc, Cord Art -1.1 (L) 3.0 - 11.0 mmol/L    O2 Content, Cord Art 6.6 ml/dl    O2 Hgb, Arterial Cord 35.5 %   CBC    Collection Time: 01/14/25  4:33 AM   Result Value Ref Range    WBC 8.48 4.31 - 10.16 Thousand/uL    RBC 2.77 (L) 3.81 - 5.12 Million/uL    Hemoglobin 8.3 (L) 11.5 - 15.4 g/dL    Hematocrit 24.6 (L) 34.8 - 46.1 %    MCV 89 82 - 98 fL    MCH 30.0 26.8 - 34.3 pg    MCHC 33.7 31.4 - 37.4 g/dL    RDW 13.8 11.6 - 15.1 %    Platelets 197 149 - 390 Thousands/uL    MPV 10.0 8.9 - 12.7 fL     Meds:    Current Facility-Administered Medications:     acetaminophen (TYLENOL) tablet 975 mg, 975 mg, Oral, Q6H AMBREEN, Jennifer Hatfield MD, 975 mg at 01/14/25 0611    benzocaine-menthol-lanolin-aloe (DERMOPLAST) 20-0.5 % topical spray 1 Application, 1 Application, Topical, Q6H PRN, Tiffany Miller MD    calcium carbonate (TUMS) chewable tablet 1,000 mg, 1,000 mg, Oral, Daily PRN, Tiffany Miller MD    diphenhydrAMINE (BENADRYL) tablet 25 mg, 25 mg, Oral, Q6H PRN, Tiffany Miller MD    famotidine (PEPCID) tablet 20 mg, 20 mg, Oral, BID, Tiffany Miller MD    fish oil capsule 1,000 mg, 1,000 mg, Oral, Daily, Tiffany Miller MD    hydrocortisone 1 % cream 1 Application, 1 Application, Topical, Daily PRN, Tiffany Miller MD    ketorolac (TORADOL) injection 15 mg, 15 mg, Intravenous, Q6H AMBREEN, 15 mg at 01/14/25 0439 **FOLLOWED BY** ibuprofen  (MOTRIN) tablet 800 mg, 800 mg, Oral, Q6H, Jennifer Hatfield MD    iron sucrose (VENOFER) 200 mg in sodium chloride 0.9 % 50 mL IVPB, 200 mg, Intravenous, Once, Jennifer Hatfield MD    lactated ringers infusion, 125 mL/hr, Intravenous, Continuous, Tiffany Miller MD, Stopped at 01/13/25 1812    magnesium chloride (MAG64) EC tablet TBEC 128 mg, 128 mg, Oral, Daily, Tiffany Miller MD    oxyCODONE (ROXICODONE) immediate release tablet 10 mg, 10 mg, Oral, Q4H PRN, Jennifer Hatfield MD    pantoprazole (PROTONIX) EC tablet 20 mg, 20 mg, Oral, Early Morning, Tiffany Miller MD, 20 mg at 01/14/25 0612    polyethylene glycol (MIRALAX) packet 17 g, 17 g, Oral, Daily PRN, Tiffany Miller MD    prenatal multivitamin tablet 1 tablet, 1 tablet, Oral, Daily, Tiffany Miller MD    simethicone (MYLICON) chewable tablet 80 mg, 80 mg, Oral, 4x Daily PRN, Tiffany Miller MD, 80 mg at 01/14/25 0614    witch hazel-glycerin (TUCKS) topical pad 1 Pad, 1 Pad, Topical, Q4H PRN, Tiffany Miller MD

## 2025-01-14 NOTE — ASSESSMENT & PLAN NOTE
The patient had a postpartum hemorrhage of 1.5 L which required extra Pitocin and TXA this morning her hemoglobin is down to 8.3 from 11.2 on admission  Plan  -Ordered Venofer to be given inpatient this morning  -Recommended continuing prenatal vitamins with iron outpatient

## 2025-01-14 NOTE — PLAN OF CARE
Problem: PAIN - ADULT  Goal: Verbalizes/displays adequate comfort level or baseline comfort level  Description: Interventions:  - Encourage patient to monitor pain and request assistance  - Assess pain using appropriate pain scale  - Administer analgesics based on type and severity of pain and evaluate response  - Implement non-pharmacological measures as appropriate and evaluate response  - Consider cultural and social influences on pain and pain management  - Notify physician/advanced practitioner if interventions unsuccessful or patient reports new pain  Outcome: Progressing     Problem: INFECTION - ADULT  Goal: Absence or prevention of progression during hospitalization  Description: INTERVENTIONS:  - Assess and monitor for signs and symptoms of infection  - Monitor lab/diagnostic results  - Monitor all insertion sites, i.e. indwelling lines, tubes, and drains  - Monitor endotracheal if appropriate and nasal secretions for changes in amount and color  - [place] appropriate cooling/warming therapies per order  - Administer medications as ordered  - Instruct and encourage patient and family to use good hand hygiene technique  - Identify and instruct in appropriate isolation precautions for identified infection/condition  Outcome: Progressing  Goal: Absence of fever/infection during neutropenic period  Description: INTERVENTIONS:  - Monitor WBC    Outcome: Progressing     Problem: SAFETY ADULT  Goal: Patient will remain free of falls  Description: INTERVENTIONS:  - Educate patient/family on patient safety including physical limitations  - Instruct patient to call for assistance with activity   - Consult OT/PT to assist with strengthening/mobility   - Keep Call bell within reach  - Keep bed low and locked with side rails adjusted as appropriate  - Keep care items and personal belongings within reach  - Initiate and maintain comfort rounds  Outcome: Progressing  Goal: Maintain or return to baseline ADL  function  Description: INTERVENTIONS:  -  Assess patient's ability to carry out ADLs; assess patient's baseline for ADL function and identify physical deficits which impact ability to perform ADLs (bathing, care of mouth/teeth, toileting, grooming, dressing, etc.)  - Assess/evaluate cause of self-care deficits   - Assess range of motion  - Assess patient's mobility; develop plan if impaired  - Assess patient's need for assistive devices and provide as appropriate  - Encourage maximum independence but intervene and supervise when necessary  - Involve family in performance of ADLs  - Assess for home care needs following discharge   - Consider OT consult to assist with ADL evaluation and planning for discharge  - Provide patient education as appropriate  Outcome: Progressing  Goal: Maintains/Returns to pre admission functional level  Description: INTERVENTIONS:  - Perform AM-PAC 6 Click Basic Mobility/ Daily Activity assessment daily.  - Set and communicate daily mobility goal to care team and patient/family/caregiver.   - Collaborate with rehabilitation services on mobility goals if consulted  - Out of bed for toileting  - Record patient progress and toleration of activity level   Outcome: Progressing     Problem: DISCHARGE PLANNING  Goal: Discharge to home or other facility with appropriate resources  Description: INTERVENTIONS:  - Identify barriers to discharge w/patient and caregiver  - Arrange for needed discharge resources and transportation as appropriate  - Identify discharge learning needs (meds, wound care, etc.)  - Arrange for interpretive services to assist at discharge as needed  - Refer to Case Management Department for coordinating discharge planning if the patient needs post-hospital services based on physician/advanced practitioner order or complex needs related to functional status, cognitive ability, or social support system  Outcome: Progressing     Problem: Knowledge Deficit  Goal:  Patient/family/caregiver demonstrates understanding of disease process, treatment plan, medications, and discharge instructions  Description: Complete learning assessment and assess knowledge base.  Interventions:  - Provide teaching at level of understanding  - Provide teaching via preferred learning methods  Outcome: Progressing     Problem: POSTPARTUM  Goal: Experiences normal postpartum course  Description: INTERVENTIONS:  - Monitor maternal vital signs  - Assess uterine involution and lochia  Outcome: Progressing  Goal: Appropriate maternal -  bonding  Description: INTERVENTIONS:  - Identify family support  - Assess for appropriate maternal/infant bonding   -Encourage maternal/infant bonding opportunities  - Referral to  or  as needed  Outcome: Progressing  Goal: Establishment of infant feeding pattern  Description: INTERVENTIONS:  - Assess breast/bottle feeding  - Refer to lactation as needed  Outcome: Progressing  Goal: Incision(s), wounds(s) or drain site(s) healing without S/S of infection  Description: INTERVENTIONS  - Assess and document dressing, incision, wound bed, drain sites and surrounding tissue  - Provide patient and family education  Outcome: Progressing

## 2025-01-15 VITALS
HEART RATE: 82 BPM | OXYGEN SATURATION: 98 % | SYSTOLIC BLOOD PRESSURE: 113 MMHG | RESPIRATION RATE: 20 BRPM | DIASTOLIC BLOOD PRESSURE: 69 MMHG | TEMPERATURE: 98 F | WEIGHT: 168 LBS | BODY MASS INDEX: 26.37 KG/M2 | HEIGHT: 67 IN

## 2025-01-15 PROCEDURE — NC001 PR NO CHARGE: Performed by: OBSTETRICS & GYNECOLOGY

## 2025-01-15 RX ORDER — OXYCODONE HYDROCHLORIDE 5 MG/1
5 TABLET ORAL EVERY 6 HOURS PRN
Qty: 20 TABLET | Refills: 0 | Status: SHIPPED | OUTPATIENT
Start: 2025-01-15 | End: 2025-01-23

## 2025-01-15 RX ORDER — ACETAMINOPHEN 325 MG/1
650 TABLET ORAL EVERY 6 HOURS PRN
Status: DISCONTINUED | OUTPATIENT
Start: 2025-01-15 | End: 2025-01-15 | Stop reason: HOSPADM

## 2025-01-15 RX ORDER — SIMETHICONE 80 MG
80 TABLET,CHEWABLE ORAL 4 TIMES DAILY PRN
Start: 2025-01-15 | End: 2025-01-23

## 2025-01-15 RX ORDER — BENZOCAINE/MENTHOL 6 MG-10 MG
1 LOZENGE MUCOUS MEMBRANE DAILY PRN
Start: 2025-01-15 | End: 2025-01-23

## 2025-01-15 RX ORDER — IBUPROFEN 600 MG/1
600 TABLET, FILM COATED ORAL EVERY 6 HOURS PRN
Qty: 30 TABLET | Refills: 0 | Status: SHIPPED | OUTPATIENT
Start: 2025-01-15

## 2025-01-15 RX ORDER — IBUPROFEN 600 MG/1
600 TABLET, FILM COATED ORAL EVERY 6 HOURS PRN
Status: DISCONTINUED | OUTPATIENT
Start: 2025-01-15 | End: 2025-01-15 | Stop reason: HOSPADM

## 2025-01-15 RX ORDER — IBUPROFEN 600 MG/1
600 TABLET, FILM COATED ORAL EVERY 6 HOURS PRN
Status: CANCELLED
Start: 2025-01-15

## 2025-01-15 RX ADMIN — ACETAMINOPHEN 975 MG: 325 TABLET, FILM COATED ORAL at 01:20

## 2025-01-15 RX ADMIN — Medication 1 TABLET: at 10:58

## 2025-01-15 RX ADMIN — IBUPROFEN 600 MG: 600 TABLET, FILM COATED ORAL at 10:58

## 2025-01-15 RX ADMIN — MAGNESIUM 64 MG (MAGNESIUM CHLORIDE) TABLET,DELAYED RELEASE 128 MG: at 10:58

## 2025-01-15 RX ADMIN — IBUPROFEN 800 MG: 400 TABLET, FILM COATED ORAL at 05:03

## 2025-01-15 RX ADMIN — OXYCODONE HYDROCHLORIDE 10 MG: 10 TABLET ORAL at 06:44

## 2025-01-15 RX ADMIN — ACETAMINOPHEN 650 MG: 325 TABLET, FILM COATED ORAL at 08:53

## 2025-01-15 RX ADMIN — OMEGA-3 FATTY ACIDS CAP 1000 MG 1000 MG: 1000 CAP at 10:58

## 2025-01-15 RX ADMIN — DOCUSATE SODIUM 100 MG: 100 CAPSULE, LIQUID FILLED ORAL at 08:48

## 2025-01-15 RX ADMIN — PANTOPRAZOLE SODIUM 20 MG: 20 TABLET, DELAYED RELEASE ORAL at 05:03

## 2025-01-15 NOTE — PROGRESS NOTES
Progress Note - OB/GYN   Nicole Forte 39 y.o. female MRN: [id number]  Unit/Bed#: -01 Encounter: [id number]      Assessment/Plan    Nicole Forte is a 39 y.o.  who is POD 2 s/p 1LTCS and bilateral salpingectomy at 37w1d     Postpartum hemorrhage  Assessment & Plan  QBL 1568 mL; s/p extra pit and TXA  Hgb 11.2 > 8.3, s/p venofer     S/P tubal ligation  Assessment & Plan  Performed after LTCS    Uterine fibroid complicating  care, baby not yet delivered in third trimester  Assessment & Plan  Subserosal myometrium fibroid located in the anterior fundus region, measuring 12.4 x 10.9 x 16.4cm with a volume of 1166.9cc. Color doppler flow was not increased. The appearance was heterogeneous.    * S/P primary low transverse   Assessment & Plan  Routine postpartum care  Encourage ambulation  Encourage familial bonding  Lactation support as needed  Pain: Motrin/Tylenol around the clock, oxycodone if needed  Postpartum Contraceptive plan: Status post tubal ligation  Pre-delivery Hgb 11.2 --> 8.3, s/p venofer   S/p lynn catheter, voiding without difficulty   DVT Ppx: ambulation, SCDs           Disposition: Anticipate discharge home postpartum Day #2-4  Barriers to discharge: none       Subjective/Objective     Subjective: Postpartum state    Pain: no  Tolerating PO: yes  Voiding: yes  Flatus: yes  Ambulating: yes  Breastfeeding: Breastfeeding  Chest pain: no  Shortness of breath: no  Leg pain: no  Lochia: wnl    Objective:     Vitals:  Vitals:    25 1645 25 2036 25 2313 01/15/25 0423   BP: 110/52 107/63 120/64 119/73   BP Location: Right arm Left arm Right arm Right arm   Pulse: 68 69 64 72   Resp: 18 17 18 18   Temp: 97.7 °F (36.5 °C) 98 °F (36.7 °C) 98.2 °F (36.8 °C) 98.3 °F (36.8 °C)   TempSrc: Oral Oral Oral Oral   SpO2: 97% 98% 97% 97%   Weight:       Height:           Physical Exam:   GEN: appears well, alert and oriented x 3, pleasant and cooperative   CV: Regular rate  RESP:  non labored breathing  ABDOMEN: soft, no tenderness, no distention, Uterine fundus firm and non-tender, below the umbilicus, incision c/d/i  EXTREMITIES: non-tender  NEURO Alert and oriented to person, place, and time.       Lab Results   Component Value Date    WBC 8.48 01/14/2025    HGB 8.3 (L) 01/14/2025    HCT 24.6 (L) 01/14/2025    MCV 89 01/14/2025     01/14/2025         Viridiana Friend MD  Obstetrics & Gynecology  01/15/25

## 2025-01-15 NOTE — PLAN OF CARE
Problem: PAIN - ADULT  Goal: Verbalizes/displays adequate comfort level or baseline comfort level  Description: Interventions:  - Encourage patient to monitor pain and request assistance  - Assess pain using appropriate pain scale  - Administer analgesics based on type and severity of pain and evaluate response  - Implement non-pharmacological measures as appropriate and evaluate response  - Consider cultural and social influences on pain and pain management  - Notify physician/advanced practitioner if interventions unsuccessful or patient reports new pain  1/14/2025 2102 by Anahi Herndno RN  Outcome: Progressing  1/14/2025 2102 by Anahi Herndon RN  Outcome: Progressing     Problem: INFECTION - ADULT  Goal: Absence or prevention of progression during hospitalization  Description: INTERVENTIONS:  - Assess and monitor for signs and symptoms of infection  - Monitor lab/diagnostic results  - Monitor all insertion sites, i.e. indwelling lines, tubes, and drains  - Monitor endotracheal if appropriate and nasal secretions for changes in amount and color  - [place] appropriate cooling/warming therapies per order  - Administer medications as ordered  - Instruct and encourage patient and family to use good hand hygiene technique  - Identify and instruct in appropriate isolation precautions for identified infection/condition  1/14/2025 2102 by Anahi Herndon RN  Outcome: Progressing  1/14/2025 2102 by Anahi Herndon RN  Outcome: Progressing  Goal: Absence of fever/infection during neutropenic period  Description: INTERVENTIONS:  - Monitor WBC    1/14/2025 2102 by Anahi Herndon RN  Outcome: Progressing  1/14/2025 2102 by Anahi Herndon RN  Outcome: Progressing     Problem: SAFETY ADULT  Goal: Patient will remain free of falls  Description: INTERVENTIONS:  - Educate patient/family on patient safety including physical limitations  - Instruct patient to call for assistance with activity   - Consult OT/PT  to assist with strengthening/mobility   - Keep Call bell within reach  - Keep bed low and locked with side rails adjusted as appropriate  - Keep care items and personal belongings within reach  - Initiate and maintain comfort rounds  - Make Fall Risk Sign visible to staff  - Offer Toileting every PRN Hours, in advance of need  1/14/2025 2102 by Anahi Herndon RN  Outcome: Progressing  1/14/2025 2102 by Anahi Herndon RN  Outcome: Progressing  Goal: Maintain or return to baseline ADL function  Description: INTERVENTIONS:  -  Assess patient's ability to carry out ADLs; assess patient's baseline for ADL function and identify physical deficits which impact ability to perform ADLs (bathing, care of mouth/teeth, toileting, grooming, dressing, etc.)  - Assess/evaluate cause of self-care deficits   - Assess range of motion  - Assess patient's mobility; develop plan if impaired  - Assess patient's need for assistive devices and provide as appropriate  - Encourage maximum independence but intervene and supervise when necessary  - Involve family in performance of ADLs  - Assess for home care needs following discharge   - Consider OT consult to assist with ADL evaluation and planning for discharge  - Provide patient education as appropriate  1/14/2025 2102 by Anahi Herndon RN  Outcome: Progressing  1/14/2025 2102 by Anahi Herndon RN  Outcome: Progressing  Goal: Maintains/Returns to pre admission functional level  Description: INTERVENTIONS:  - Perform AM-PAC 6 Click Basic Mobility/ Daily Activity assessment daily.  - Set and communicate daily mobility goal to care team and patient/family/caregiver.   - Collaborate with rehabilitation services on mobility goals if consulted  - Perform Range of Motion PRN times a day.  - Reposition patient every PRN hours.  - Dangle patient PRN times a day  - Stand patient PRN times a day  - Ambulate patient PRN times a day  - Out of bed to chair PRN times a day   - Out of bed for  meals PRN times a day  - Out of bed for toileting  - Record patient progress and toleration of activity level   2025 by Anahi Herndon RN  Outcome: Progressing  2025 by Anahi Herndon RN  Outcome: Progressing     Problem: DISCHARGE PLANNING  Goal: Discharge to home or other facility with appropriate resources  Description: INTERVENTIONS:  - Identify barriers to discharge w/patient and caregiver  - Arrange for needed discharge resources and transportation as appropriate  - Identify discharge learning needs (meds, wound care, etc.)  - Arrange for interpretive services to assist at discharge as needed  - Refer to Case Management Department for coordinating discharge planning if the patient needs post-hospital services based on physician/advanced practitioner order or complex needs related to functional status, cognitive ability, or social support system  2025 by Anahi Herndon RN  Outcome: Progressing  2025 by Anahi Herndon RN  Outcome: Progressing     Problem: Knowledge Deficit  Goal: Patient/family/caregiver demonstrates understanding of disease process, treatment plan, medications, and discharge instructions  Description: Complete learning assessment and assess knowledge base.  Interventions:  - Provide teaching at level of understanding  - Provide teaching via preferred learning methods  2025 by Anahi Herndon RN  Outcome: Progressing  2025 by Anahi Herndon RN  Outcome: Progressing     Problem: POSTPARTUM  Goal: Experiences normal postpartum course  Description: INTERVENTIONS:  - Monitor maternal vital signs  - Assess uterine involution and lochia  2025 by Anahi Herndon RN  Outcome: Progressing  2025 by Anahi Herndon RN  Outcome: Progressing  Goal: Appropriate maternal -  bonding  Description: INTERVENTIONS:  - Identify family support  - Assess for appropriate maternal/infant bonding   -Encourage  maternal/infant bonding opportunities  - Referral to  or  as needed  1/14/2025 2102 by Anahi Herndon RN  Outcome: Progressing  1/14/2025 2102 by Anahi Herndon RN  Outcome: Progressing  Goal: Establishment of infant feeding pattern  Description: INTERVENTIONS:  - Assess breast/bottle feeding  - Refer to lactation as needed  1/14/2025 2102 by Anahi Herndon RN  Outcome: Progressing  1/14/2025 2102 by Anahi Herndon RN  Outcome: Progressing  Goal: Incision(s), wounds(s) or drain site(s) healing without S/S of infection  Description: INTERVENTIONS  - Assess and document dressing, incision, wound bed, drain sites and surrounding tissue  - Provide patient and family education  - Perform skin care/dressing changes every PRN  1/14/2025 2102 by Anahi Herndon RN  Outcome: Progressing  1/14/2025 2102 by Anahi Herndon RN  Outcome: Progressing

## 2025-01-16 ENCOUNTER — HOSPITAL ENCOUNTER (EMERGENCY)
Facility: HOSPITAL | Age: 40
Discharge: HOME | End: 2025-01-16
Attending: EMERGENCY MEDICINE
Payer: COMMERCIAL

## 2025-01-16 ENCOUNTER — NURSE TRIAGE (OUTPATIENT)
Dept: OTHER | Facility: OTHER | Age: 40
End: 2025-01-16

## 2025-01-16 VITALS
OXYGEN SATURATION: 100 % | HEART RATE: 65 BPM | RESPIRATION RATE: 18 BRPM | SYSTOLIC BLOOD PRESSURE: 121 MMHG | TEMPERATURE: 98.1 F | BODY MASS INDEX: 26.54 KG/M2 | HEIGHT: 67 IN | DIASTOLIC BLOOD PRESSURE: 59 MMHG | WEIGHT: 169.09 LBS

## 2025-01-16 PROCEDURE — 96365 THER/PROPH/DIAG IV INF INIT: CPT

## 2025-01-16 PROCEDURE — 99284 EMERGENCY DEPT VISIT MOD MDM: CPT | Performed by: EMERGENCY MEDICINE

## 2025-01-16 PROCEDURE — 96366 THER/PROPH/DIAG IV INF ADDON: CPT

## 2025-01-16 PROCEDURE — 96361 HYDRATE IV INFUSION ADD-ON: CPT

## 2025-01-16 PROCEDURE — 99283 EMERGENCY DEPT VISIT LOW MDM: CPT

## 2025-01-16 RX ORDER — SODIUM CHLORIDE, SODIUM LACTATE, POTASSIUM CHLORIDE, CALCIUM CHLORIDE 600; 310; 30; 20 MG/100ML; MG/100ML; MG/100ML; MG/100ML
1000 INJECTION, SOLUTION INTRAVENOUS CONTINUOUS
Status: DISCONTINUED | OUTPATIENT
Start: 2025-01-16 | End: 2025-01-16 | Stop reason: HOSPADM

## 2025-01-16 RX ADMIN — SODIUM CHLORIDE, SODIUM LACTATE, POTASSIUM CHLORIDE, AND CALCIUM CHLORIDE 1000 ML: .6; .31; .03; .02 INJECTION, SOLUTION INTRAVENOUS at 08:37

## 2025-01-16 RX ADMIN — CAFFEINE AND SODIUM BENZOATE 500 MG: 125 INJECTION, SOLUTION INTRAMUSCULAR; INTRAVENOUS at 06:28

## 2025-01-16 NOTE — QUICK NOTE
Patient seen at bedside in ED due to concern for post-dural puncture headache (PDPH).    Patient is healthy  F who is POD #3 s/p LTCS w/ b/l tubal ligation. Patient received spinal anesthetic that was performed with 24G pencan (non-cutting) spinal needle. Uncomplicated procedure that was successful with single needle puncture.     POD1 patient noted gradual onset of b/l frontal HA, which worsened upon discharge POD2.  (1/15/25). She presented to ED this AM complaining of 10/10 b/l frontal HA w/ radiation to b/l temporal and occipital regions. Patient has not noticed improvement with supine positioning. Denies N/V, photophobia, neck / shoulder pain or stiffness characteristic of PDPH. She is able to perform ADLs and care for  without issue. She is taking acetaminophen 325mg PO and ibuprofen 600mg PO around the clock in addition to PO oxycodone for CS-related pain. States these medications are not helping with HA.     On exam this AM patient seated upright comfortably in ER bed, able to hold conversation. Denies visual changes / photophobia, neck stiffness at this time. Has received 500mL normal saline and IV caffeine which she states has improved her HA pain to 4/10 from 10/10.     Thoroughly described continued conservative management versus epidural blood patch procedure to patient. Explained that based on available data, 70-80% of PDPH resolve within one week with conservative management (hydration, pharmacologic management), and that it is encouraging she has responded to treatment thus far. Explained that epidural blood patch procedure is gold standard definitive treatment. Risks of procedure include infection, neuropathy, incidental dural puncture resulting in worsening of HA symptoms, acute lower back pain from epidural blood injection.    She is electing to continue with conservative management at this time with instructions to return to ED for blood patch should her HA worsen or not resolve within  a week of dural puncture.      Recommendations:    - Acetaminophen 1000mg PO q6h   - Ibuprofen 600mg PO q6h  - PO caffeine consumption vs. OTC caffeine tablets  - 2-3L IVF while in ED  - Continued aggressive PO fluid consumption while at home  - Continue ADLs and caring for , while exercising caution lifting heavy objects or engaging in strenuous activity until HA resolves. Strict bedrest has not shown to improve resolution of PDPH      Please do not hesitate to reach out to our department with further questions!      HANNAH Chowdhury,   [facility]  Department of Anesthesiology  [facility]

## 2025-01-16 NOTE — TELEPHONE ENCOUNTER
"Reason for Disposition  • [1] SEVERE headache AND [2] after spinal (epidural) anesthesia    Answer Assessment - Initial Assessment Questions  1. LOCATION: \"Where does it hurt?\"       \"Behind eyes, under the crown of head to my neck.\"    2. ONSET: \"When did the headache start?\" (Minutes, hours or days)       Has not slept well; headache started yesterday     3. PATTERN: \"Does the pain come and go, or has it been constant since it started?\"      Constant     4. SEVERITY: \"How bad is the pain?\" and \"What does it keep you from doing?\"       Severe headache     6. CAUSE: \"What do you think is causing the headache?\"      4 days post , concerned for spinal headache     8. PREECLAMPSIA - HYPERTENSION:  \"Were you diagnosed with preeclampsia during your pregnancy?\"        Denies     9. HEAD INJURY: \"Has there been any recent injury to the head?\"       Denies     10. OTHER SYMPTOMS: \"Do you have any other symptoms?\" (e.g., fever, stiff neck, blurred vision; swelling of hands, face, or feet)        Denies visual changes   No pain meds are helping     11. DELIVERY DATE: \"When was your delivery date?\" \"Vaginal delivery or ?\" \"Did you have a spinal (epidural) anesthesia during childbirth?\"     C/section with spinal anesthesia   25        ESC placed to on call provider to inquire if patient should go to ED or L&D for blood patch. Patient is also inquiring if she can go to [place] or if she has to go to [place].  Patient advised to go to [place] ED for eval, agreeable to plan.    Protocols used: Postpartum - Headache-Adult-AH    "

## 2025-01-16 NOTE — DISCHARGE INSTRUCTIONS
Diagnosis; post epidural headache     -  please continue with ibuprofen  and over the counter generic acetaminophen 2-500 mg tablets every 6 hrs while awake     - would try to increase caffeine intake over next week--     - please return to  the er for any  fevers- temp > 100.4- worsening headache- or headaches that persist continue to persist for 1 week after c section

## 2025-01-16 NOTE — ED PROVIDER NOTES
ED Disposition       None          Assessment & Plan       Medical Decision Making  BY HX  -POST EPIDURAL HEADACHE- AFTER H AND  P- ER MD DOUBT ANY DVST /PRESS-- POST PARTUM PRECCLAMPSIA OR ANY OTHER MORE SERIOUS CAUSE OF SYMPTOMS-- PT WILL NEED SUPPORTTIVE CARE- AND DISCUSSION WITH ANESTHESIA     Amount and/or Complexity of Data Reviewed  Independent Historian: spouse  External Data Reviewed: labs and notes.     Details: AL REVIEWED   Discussion of management or test interpretation with external provider(s): MODERATE AMOUNT OF ER MD THOUGHT COMPLEXITY --     Risk  OTC drugs.  Decision regarding hospitalization.        ED Course as of 01/16/25 1158   Thu Jan 16, 2025   0632 Er md note- case d/w anesthesia-- will be down to see pt- pt and  aware of anesthesia to see -- will cont to re-eval while in er    0730 Er md note- pt seen and anesthesia --  pt with improvement with iv caffeine- pt would like conservative management with no blood patch at this point-- anesthesia would liek to give an additional liter of fluid prior to er d/c-- pt aware that might need to come back  if headache returns  or lasts longer than 1 week        Medications   caffeine-sodium benzoate 500 mg in sodium chloride 0.9 % 1,000 mL IVPB (500 mg Intravenous New Bag 1/16/25 0628)       ED Risk Strat Scores                          SBIRT 20yo+      Flowsheet Row Most Recent Value   Initial Alcohol Screen: US AUDIT-C     1. How often do you have a drink containing alcohol? 0 Filed at: 01/16/2025 0553   2. How many drinks containing alcohol do you have on a typical day you are drinking?  0 Filed at: 01/16/2025 0553   3a. Male UNDER 65: How often do you have five or more drinks on one occasion? 0 Filed at: 01/16/2025 0553   3b. FEMALE Any Age, or MALE 65+: How often do you have 4 or more drinks on one occassion? 0 Filed at: 01/16/2025 0553   Audit-C Score 0 Filed at: 01/16/2025 0553   CONRADO: How many times in the past year have you...    Used  an illegal drug or used a prescription medication for non-medical reasons? Never Filed at: 2025 0553                            History of Present Illness       Chief Complaint   Patient presents with    Postpartum Complications     Had C section on Monday. Started with bad headache Tuesday that is getting worse and worse. Did have epidural and questioning if she needs a blood patch.        Past Medical History:   Diagnosis Date    Varicella     chicken pox as child      Past Surgical History:   Procedure Laterality Date    INCISION AND DRAINAGE OF WOUND Left 2021    Procedure: INCISION AND DRAINAGE (I&D) HEAD/FACE and extraction of tooth #18;  Surgeon: Jourdan Perkins DMD;  [facility];  Service: Maxillofacial    [place]  DELIVERY ONLY N/A 2025    Procedure:  SECTION ();  Surgeon: Tata Iniguez MD;  [facility];  Service: Obstetrics    SALPINGECTOMY N/A 2025    Procedure: SALPINGECTOMY;  Surgeon: Tata Iniguez MD;  [facility];  Service: Obstetrics      Family History   Problem Relation Age of Onset    No Known Problems Mother     Cancer Father         bladder cancer    No Known Problems Brother     No Known Problems Maternal Grandmother     No Known Problems Maternal Grandfather       Social History     Tobacco Use    Smoking status: Never    Smokeless tobacco: Never   Vaping Use    Vaping status: Never Used   Substance Use Topics    Alcohol use: Not Currently    Drug use: Never      E-Cigarette/Vaping    E-Cigarette Use Never User       E-Cigarette/Vaping Substances    Nicotine No     THC No     CBD No     Flavoring No     Other No     Unknown No       I have reviewed and agree with the history as documented.     39 yr female c section delivery on - with spinal epidural-- states gradual onset of frontal to occipital  headache over last 2 days  - head worse with position changes-- no fevers- no visual complaints- no n/v--  no neuro  complaints-- no   worsening abd pain --  or any new gu/gyn comps       History provided by:  Patient and spouse   used: No        Review of Systems   Constitutional: Negative.    HENT: Negative.     Eyes: Negative.    Respiratory: Negative.     Cardiovascular: Negative.    Gastrointestinal: Negative.    Endocrine: Negative.    Genitourinary: Negative.    Musculoskeletal: Negative.    Skin: Negative.    Allergic/Immunologic: Negative.    Neurological:  Positive for headaches. Negative for dizziness, tremors, seizures, syncope, facial asymmetry, speech difficulty, weakness, light-headedness and numbness.   Hematological: Negative.    Psychiatric/Behavioral: Negative.             Objective       ED Triage Vitals [01/16/25 0555]   Temperature Pulse Blood Pressure Respirations SpO2 Patient Position - Orthostatic VS   98.1 °F (36.7 °C) 66 128/81 18 99 % Sitting      Temp Source Heart Rate Source BP Location FiO2 (%) Pain Score    Oral Monitor Right arm -- 6      Vitals      Date and Time Temp Pulse SpO2 Resp BP Pain Score FACES Pain Rating User   01/16/25 0555 98.1 °F (36.7 °C) 66 99 % 18 128/81 6 -- ALEJANDRA            Physical Exam  Vitals and nursing note reviewed.   Constitutional:       General: She is not in acute distress.     Appearance: Normal appearance. She is not ill-appearing, toxic-appearing or diaphoretic.      Comments: Avss-  pulse ox 99 % on ra- interpretation is normal- no intervention    HENT:      Head: Normocephalic and atraumatic.      Nose: Nose normal. No congestion or rhinorrhea.      Comments: No mid facial pain /edema- erythema- no bilateral max/frontal sinus tenderness-edema'/ erythema     Mouth/Throat:      Mouth: Mucous membranes are moist.      Pharynx: Oropharynx is clear. No oropharyngeal exudate or posterior oropharyngeal erythema.   Eyes:      General: No scleral icterus.        Right eye: No discharge.         Left eye: No discharge.      Extraocular Movements:  Extraocular movements intact.      Conjunctiva/sclera: Conjunctivae normal.      Pupils: Pupils are equal, round, and reactive to light.      Comments: Mm pink - no papilledema bilaterally with pan optic    Neck:      Vascular: No carotid bruit.      Comments: No pmt c/t/l/s spine to palpation - no meningal signs- pain in posterior neck worse upon pt neck extension   Cardiovascular:      Rate and Rhythm: Normal rate and regular rhythm.      Pulses: Normal pulses.      Heart sounds: Normal heart sounds. No murmur heard.     No friction rub. No gallop.   Pulmonary:      Effort: Pulmonary effort is normal. No respiratory distress.      Breath sounds: Normal breath sounds. No stridor. No wheezing, rhonchi or rales.   Chest:      Chest wall: No tenderness.   Abdominal:      General: Bowel sounds are normal. There is no distension.      Palpations: Abdomen is soft. There is no mass.      Tenderness: There is no abdominal tenderness. There is no right CVA tenderness, left CVA tenderness, guarding or rebound.      Hernia: No hernia is present.      Comments: Vertical midline horizontal c section incision  with glue closure- no signs of infection --    Musculoskeletal:         General: No swelling, tenderness, deformity or signs of injury. Normal range of motion.      Cervical back: Normal range of motion and neck supple. No rigidity or tenderness.      Right lower leg: No edema.      Left lower leg: No edema.   Lymphadenopathy:      Cervical: No cervical adenopathy.   Skin:     General: Skin is warm.      Capillary Refill: Capillary refill takes less than 2 seconds.      Coloration: Skin is not jaundiced or pale.      Findings: No bruising, erythema, lesion or rash.   Neurological:      General: No focal deficit present.      Mental Status: She is alert and oriented to person, place, and time. Mental status is at baseline.      Cranial Nerves: No cranial nerve deficit.      Sensory: No sensory deficit.      Motor: No  weakness.      Coordination: Coordination normal.      Gait: Gait normal.      Comments: Normal non focal neuro exam- normal cn exam - normal bue/ble strength-sensation    Psychiatric:         Mood and Affect: Mood normal.         Behavior: Behavior normal.         Thought Content: Thought content normal.         Judgment: Judgment normal.         Results Reviewed       None            No orders to display       Procedures    ED Medication and Procedure Management   Prior to Admission Medications   Prescriptions Last Dose Informant Patient Reported? Taking?   Magnesium Chloride-Calcium (SLOW MAGNESIUM/CALCIUM PO)   Yes No   Sig: Take 1 tablet by mouth daily at bedtime Total of 143mg   Omega-3 Fatty Acids (Fish Oil) 1200 MG CAPS   Yes No   Sig: Take 1 capsule by mouth in the morning   Prenatal Vit-Fe Sulfate-FA-DHA (Prenatal Vitamin/Min +DHA) 27-0.8-200 MG CAPS   Yes No   Sig: Take 1 capsule by mouth in the morning   acetaminophen (TYLENOL) 325 mg tablet   Yes No   Sig: Take 650 mg by mouth every 6 (six) hours as needed for mild pain   benzocaine-menthol-lanolin-aloe (DERMOPLAST) 20-0.5 % topical spray   No No   Sig: Apply 1 Application topically every 6 (six) hours as needed for irritation or mild pain   famotidine (PEPCID) 20 mg tablet   Yes No   Sig: Take 20 mg by mouth 2 (two) times a day   hydrocortisone 1 % cream   No No   Sig: Apply 1 Application topically daily as needed for irritation   ibuprofen (MOTRIN) 600 mg tablet   No No   Sig: Take 1 tablet (600 mg total) by mouth every 6 (six) hours as needed for moderate pain (cramping)   omeprazole (PriLOSEC) 20 mg delayed release capsule   Yes No   Sig: Take 20 mg by mouth daily   oxyCODONE (ROXICODONE) 5 immediate release tablet   No No   Sig: Take 1 tablet (5 mg total) by mouth every 6 (six) hours as needed for moderate pain or severe pain for up to 10 days Max Daily Amount: 20 mg   simethicone (MYLICON) 80 mg chewable tablet   No No   Sig: Chew 1 tablet (80 mg  total) 4 (four) times a day as needed for flatulence   witch hazel-glycerin (TUCKS) topical pad   No No   Sig: Apply 1 Pad topically every 4 (four) hours as needed for irritation      Facility-Administered Medications: None     Patient's Medications   Discharge Prescriptions    No medications on file     No discharge procedures on file.  ED SEPSIS DOCUMENTATION            Avi Mancia MD  01/16/25 1200

## 2025-01-16 NOTE — TELEPHONE ENCOUNTER
"Regarding: post op c section/ headache  ----- Message from Cristy MARIE sent at 2025  4:56 AM EST -----  \"I had a  on Monday and I have been having headaches since,\"    "

## 2025-01-19 LAB — PLACENTA IN STORAGE: NORMAL

## 2025-01-22 ENCOUNTER — OFFICE VISIT (OUTPATIENT)
Dept: URGENT CARE | Facility: CLINIC | Age: 40
End: 2025-01-22
Payer: COMMERCIAL

## 2025-01-22 VITALS
BODY MASS INDEX: 24.12 KG/M2 | WEIGHT: 154 LBS | HEART RATE: 76 BPM | RESPIRATION RATE: 16 BRPM | OXYGEN SATURATION: 99 % | TEMPERATURE: 97.3 F | SYSTOLIC BLOOD PRESSURE: 117 MMHG | DIASTOLIC BLOOD PRESSURE: 70 MMHG

## 2025-01-22 DIAGNOSIS — J06.9 ACUTE URI: Primary | ICD-10-CM

## 2025-01-22 PROCEDURE — 99213 OFFICE O/P EST LOW 20 MIN: CPT

## 2025-01-22 NOTE — PROGRESS NOTES
[facility]        NAME: Nicole Forte is a 39 y.o. female  : 1985    MRN: [id number]  DATE: 2025  TIME: 11:21 AM    Assessment and Plan   Acute URI [J06.9]  1. Acute URI          Symptoms appear viral, continue supportive management.     Patient Instructions     Supportive care with rest, adequate hydration, and warm liquids   Over the counter Tylenol/acetaminophen as needed for fever  Over the counter cold/cough medicine as needed    Follow up with PCP in 3-5 days   Go to ER if worsening symptoms      If tests are performed, our office will contact you with results only if changes need to made to the care plan discussed with you at the visit. [facility]    Chief Complaint     Chief Complaint   Patient presents with    Cold Like Symptoms     Congestion, coughing, sore throat         History of Present Illness       Patient is 1 week post partum.  had a cold while she was giving birth. Is concerned she now has it.     Cough  This is a new problem. The current episode started in the past 7 days. Associated symptoms include nasal congestion and a sore throat. Pertinent negatives include no chest pain, chills, fever, headaches, myalgias, postnasal drip, rhinorrhea or shortness of breath. Treatments tried: Tylenol. The treatment provided mild relief. There is no history of asthma or environmental allergies.       Review of Systems   Review of Systems   Constitutional:  Negative for chills and fever.   HENT:  Positive for congestion and sore throat. Negative for postnasal drip, rhinorrhea, sinus pressure and trouble swallowing.    Respiratory:  Positive for cough and chest tightness (congestion feeling). Negative for shortness of breath.    Cardiovascular:  Negative for chest pain and palpitations.   Gastrointestinal:  Negative for abdominal pain, nausea and vomiting.   Genitourinary:  Negative for difficulty urinating.   Musculoskeletal:   Negative for myalgias.   Allergic/Immunologic: Negative for environmental allergies.   Neurological:  Negative for dizziness and headaches.         Current Medications       Current Outpatient Medications:     acetaminophen (TYLENOL) 325 mg tablet, Take 650 mg by mouth every 6 (six) hours as needed for mild pain, Disp: , Rfl:     benzocaine-menthol-lanolin-aloe (DERMOPLAST) 20-0.5 % topical spray, Apply 1 Application topically every 6 (six) hours as needed for irritation or mild pain, Disp: , Rfl:     famotidine (PEPCID) 20 mg tablet, Take 20 mg by mouth 2 (two) times a day, Disp: , Rfl:     hydrocortisone 1 % cream, Apply 1 Application topically daily as needed for irritation, Disp: , Rfl:     ibuprofen (MOTRIN) 600 mg tablet, Take 1 tablet (600 mg total) by mouth every 6 (six) hours as needed for moderate pain (cramping), Disp: 30 tablet, Rfl: 0    Magnesium Chloride-Calcium (SLOW MAGNESIUM/CALCIUM PO), Take 1 tablet by mouth daily at bedtime Total of 143mg, Disp: , Rfl:     Omega-3 Fatty Acids (Fish Oil) 1200 MG CAPS, Take 1 capsule by mouth in the morning, Disp: , Rfl:     omeprazole (PriLOSEC) 20 mg delayed release capsule, Take 20 mg by mouth daily, Disp: , Rfl:     oxyCODONE (ROXICODONE) 5 immediate release tablet, Take 1 tablet (5 mg total) by mouth every 6 (six) hours as needed for moderate pain or severe pain for up to 10 days Max Daily Amount: 20 mg, Disp: 20 tablet, Rfl: 0    Prenatal Vit-Fe Sulfate-FA-DHA (Prenatal Vitamin/Min +DHA) 27-0.8-200 MG CAPS, Take 1 capsule by mouth in the morning, Disp: , Rfl:     simethicone (MYLICON) 80 mg chewable tablet, Chew 1 tablet (80 mg total) 4 (four) times a day as needed for flatulence, Disp: , Rfl:     witch hazel-glycerin (TUCKS) topical pad, Apply 1 Pad topically every 4 (four) hours as needed for irritation, Disp: , Rfl:     Current Allergies     Allergies as of 01/22/2025    (No Known Allergies)            The following portions of the patient's history were  reviewed and updated as appropriate: allergies, current medications, past family history, past medical history, past social history, past surgical history and problem list.     Past Medical History:   Diagnosis Date    Varicella     chicken pox as child       Past Surgical History:   Procedure Laterality Date    INCISION AND DRAINAGE OF WOUND Left 2021    Procedure: INCISION AND DRAINAGE (I&D) HEAD/FACE and extraction of tooth #18;  Surgeon: Jourdan Perkins DMD;  [facility];  Service: Maxillofacial    [place]  DELIVERY ONLY N/A 2025    Procedure:  SECTION ();  Surgeon: Tata Iniguez MD;  [facility];  Service: Obstetrics    SALPINGECTOMY N/A 2025    Procedure: SALPINGECTOMY;  Surgeon: Tata Iniguez MD;  [facility];  Service: Obstetrics       Family History   Problem Relation Age of Onset    No Known Problems Mother     Cancer Father         bladder cancer    No Known Problems Brother     No Known Problems Maternal Grandmother     No Known Problems Maternal Grandfather          Medications have been verified.        Objective   /70   Pulse 76   Temp (!) 97.3 °F (36.3 °C)   Resp 16   Wt 69.9 kg (154 lb)   LMP 2024   SpO2 99%   BMI 24.12 kg/m²        Physical Exam     Physical Exam  Constitutional:       General: She is not in acute distress.     Appearance: She is not ill-appearing.   HENT:      Nose: Congestion present.      Mouth/Throat:      Mouth: Mucous membranes are moist.      Pharynx: Oropharynx is clear.   Eyes:      Pupils: Pupils are equal, round, and reactive to light.   Cardiovascular:      Rate and Rhythm: Normal rate and regular rhythm.      Pulses: Normal pulses.      Heart sounds: Normal heart sounds. No murmur heard.     No gallop.   Pulmonary:      Effort: Pulmonary effort is normal. No respiratory distress.      Breath sounds: Normal breath sounds. No stridor. No wheezing, rhonchi or rales.   Abdominal:       General: Abdomen is flat. Bowel sounds are normal. There is no distension.      Palpations: Abdomen is soft.      Tenderness: There is no abdominal tenderness.   Musculoskeletal:         General: Normal range of motion.      Cervical back: Normal range of motion.   Skin:     General: Skin is warm and dry.      Capillary Refill: Capillary refill takes less than 2 seconds.   Neurological:      Mental Status: She is alert and oriented to person, place, and time.

## 2025-01-22 NOTE — PATIENT INSTRUCTIONS
Called patient and left a vm to call back    Supportive care with rest, adequate hydration, and warm liquids   Over the counter Tylenol/acetaminophen as needed for fever  Over the counter cold/cough medicine as needed    Follow up with PCP in 3-5 days   Go to ER if worsening symptoms

## 2025-01-23 ENCOUNTER — OFFICE VISIT (OUTPATIENT)
Dept: OBGYN CLINIC | Facility: CLINIC | Age: 40
End: 2025-01-23

## 2025-01-23 VITALS
WEIGHT: 154 LBS | SYSTOLIC BLOOD PRESSURE: 118 MMHG | DIASTOLIC BLOOD PRESSURE: 72 MMHG | BODY MASS INDEX: 24.17 KG/M2 | HEIGHT: 67 IN

## 2025-01-23 DIAGNOSIS — N39.3 SUI (STRESS URINARY INCONTINENCE, FEMALE): ICD-10-CM

## 2025-01-23 DIAGNOSIS — Z98.891 STATUS POST CESAREAN SECTION: Primary | ICD-10-CM

## 2025-01-23 PROCEDURE — 99024 POSTOP FOLLOW-UP VISIT: CPT | Performed by: STUDENT IN AN ORGANIZED HEALTH CARE EDUCATION/TRAINING PROGRAM

## 2025-01-23 NOTE — PROGRESS NOTES
"Postpartum Visit  MD Zoie    25      Subjective     Nicole Forte is a 39 y.o.  female who presents for a postpartum visit. She is s/p CS at 37w1d on 25.      Term 25 37w1d  2700 g (5 lb 15.2 oz) F CS-LTranv   Living 9 9    Name: Robbin Morton   Location: [facility] ([facility])      FGR  Doing well    Incision: no concerns, minimal pain, only taking motrin and not really for incision itself  Lochia is staining only.   Bowel function is normal.   Bladder function is normal.     contraception method: s/p bilateral salpingectomy at time of CS    Gestational Diabetes: no  Gestational HTN/Preeclampsia: no  Pregnancy Complications: nc    Exercised up until day before delivery, feels great, would like to return to gym ASAP  Mild ALLIE with coughing    The following portions of the patient's history were reviewed and updated as appropriate: allergies, current medications, past medical history, past social history, past surgical history, and problem list.      Current Outpatient Medications:     acetaminophen (TYLENOL) 325 mg tablet, Take 650 mg by mouth every 6 (six) hours as needed for mild pain, Disp: , Rfl:     ibuprofen (MOTRIN) 600 mg tablet, Take 1 tablet (600 mg total) by mouth every 6 (six) hours as needed for moderate pain (cramping), Disp: 30 tablet, Rfl: 0    Magnesium Chloride-Calcium (SLOW MAGNESIUM/CALCIUM PO), Take 1 tablet by mouth daily at bedtime Total of 143mg, Disp: , Rfl:     Omega-3 Fatty Acids (Fish Oil) 1200 MG CAPS, Take 1 capsule by mouth in the morning, Disp: , Rfl:     Prenatal Vit-Fe Sulfate-FA-DHA (Prenatal Vitamin/Min +DHA) 27-0.8-200 MG CAPS, Take 1 capsule by mouth in the morning, Disp: , Rfl:     No Known Allergies    Review of Systems  Per HPI    Objective      /72 (BP Location: Left arm, Patient Position: Sitting, Cuff Size: Standard)   Ht 5' 7\" (1.702 m)   Wt 69.9 kg (154 lb)   LMP 2024   Breastfeeding Yes   BMI " 24.12 kg/m²   Physical Exam  Constitutional:       Appearance: Normal appearance.   HENT:      Head: Normocephalic.   Eyes:      Extraocular Movements: Extraocular movements intact.      Conjunctiva/sclera: Conjunctivae normal.   Pulmonary:      Effort: Pulmonary effort is normal.   Abdominal:      General: There is no distension.      Palpations: Abdomen is soft.      Tenderness: There is no abdominal tenderness. There is no guarding.      Comments: Incision clean, dry, intact without erythema, induration, bleeding or discharge, no ttp  Glue removed, small piece of suture at left aspect, cut and removed without difficulty     Musculoskeletal:         General: Normal range of motion.      Cervical back: Normal range of motion.   Skin:     General: Skin is warm and dry.   Neurological:      General: No focal deficit present.   Psychiatric:         Mood and Affect: Mood normal.         Behavior: Behavior normal.         Thought Content: Thought content normal.           Assessment/Plan:  Nicole Forte is a 39 y.o. who is postpartum from a pCS + BS with a normal incision check    Interested in returning to exercise as soon as possible, feels great  Reviewed typically recommend 6 weeks before return to strenuous exercise or heavy lifting (or sex)  Interested in PP PFPT to help with smooth return to exercise and treatment of mild ALLIE  RTO for PP and then annual in 3-6 months

## 2025-01-24 ENCOUNTER — TELEMEDICINE (OUTPATIENT)
Dept: OTHER | Facility: HOSPITAL | Age: 40
End: 2025-01-24
Payer: COMMERCIAL

## 2025-01-24 VITALS — TEMPERATURE: 100.2 F

## 2025-01-24 DIAGNOSIS — N61.0 MASTITIS: Primary | ICD-10-CM

## 2025-01-24 DIAGNOSIS — J06.9 URI WITH COUGH AND CONGESTION: Primary | ICD-10-CM

## 2025-01-24 PROBLEM — O36.5930 INTRAUTERINE GROWTH RESTRICTION (IUGR) AFFECTING CARE OF MOTHER, THIRD TRIMESTER, SINGLE GESTATION: Status: RESOLVED | Noted: 2024-12-31 | Resolved: 2025-01-24

## 2025-01-24 PROBLEM — Z3A.36 36 WEEKS GESTATION OF PREGNANCY: Status: RESOLVED | Noted: 2024-07-19 | Resolved: 2025-01-24

## 2025-01-24 PROCEDURE — 99213 OFFICE O/P EST LOW 20 MIN: CPT | Performed by: PHYSICIAN ASSISTANT

## 2025-01-24 PROCEDURE — 99213 OFFICE O/P EST LOW 20 MIN: CPT | Performed by: NURSE PRACTITIONER

## 2025-01-24 RX ORDER — CEPHALEXIN 500 MG/1
500 CAPSULE ORAL EVERY 8 HOURS SCHEDULED
Qty: 21 CAPSULE | Refills: 0 | Status: SHIPPED | OUTPATIENT
Start: 2025-01-24 | End: 2025-01-31

## 2025-01-24 NOTE — PATIENT INSTRUCTIONS
Will treat for mastitis. Start antibiotic. Take probiotic. Warm compresses 3 times per day.  Continue to nurse.  Follow up with PCP if no improvement. Go to ER with any worsening symptoms.

## 2025-01-24 NOTE — PATIENT INSTRUCTIONS
"As we discussed, this sounds viral. If fevers persist >5 days, more concerned for secondary infection such as pneumonia    You can go to any outpatient [facility] Radiology site during walk-in hours to get an X-ray  Just provide your name and date of birth at registration and they will be able to pull up the orders.  You can search for a site using [employer] \"Find Care\" and change location type to \"Imaging\" or click the link below:  [email or web address]    It may take a few days for the radiologist to read the films.  The results will go directly to your [employer] jorden under \"Test Results\"  You should be able to screenshot the results, or you can print if opened from a web browser    Care Anywhere Phone number is [phone] if you need assistance or have further questions  note in \"Letters\" section of [employer]. Can print if opened from a web browser    You likely have a virus such as the flu or a cold. Please schedule a follow-up with your PCP within the next 2 days for recheck. Go to the ER for new worsening or concerning symptoms including but not limited to shortness of breath or chest pain    You can have fever for 3-5 days with a viral illness and then any additional symptoms that develop (congestion, cough, nausea, diarrhea) can last for another 7 days.      Stay out of work/school until fever free for 24 hours without use of tylenol or motrin     Make sure you have a thermometer and if you feel chills or sweats check it and write it down.  Take Tylenol (acetaminophen) and Motrin (ibuprofen) for fevers, body aches, and headaches. Alternate Motrin and Tylenol every 3 hours for more consistent relief.     Drink plenty of fluids to stay well hydrated- at least 2 L per day for adults  Water, hot water with lemon or honey, warm tea.   Can drink Pedialyte, Gatorade/Powerade zero, but should mix with water.      For diarrhea, vomiting and abdominal pain   Milk or juice can " "make diarrhea worse.  follow \"BRAT\" diet Bananas, Rice, Applesauce, Toast (also plain pasta or crackers)  Small frequent meals   Allow diarrhea to run its course. Most cases will resolved in 3-5 days     Use over-the-counter saline nasal spray/allergy medication for congestion, runny nose, and postnasal drip  Mucinex (guaifenesin) helps to thin and loosen mucous.   Claritin, Zyrtec, Xyzal, or Allegra are non-drowsy antihistamines- helps dry out mucous (will make mucous darker)  Delsym or robitussin (dextromethorphan) is a cough suppressant.  Oral decongestants- pseudoephedrine behind the counter pill helps with nasal and sinus congestion  Nasal Decongestants- phenylephrine or fluticasone nasal spray (oxymetazoline up to 3 days)  Vicks to the front/back of the chest bottom of the feet with socks     For sore throat relief  Warm salt water gargles, warm tea with honey as needed  Cool mist humidifier at bedside- helps keep airway moist  Chloraseptic spray or throat lozenges with benzocaine (cepacol max strength).   "

## 2025-01-24 NOTE — PROGRESS NOTES
Virtual Regular Visit  Name: Nicole Forte      : 1985      MRN: [id number]  Encounter Provider: Your Video Visit Provider  Encounter Date: 2025   Encounter department: VIRTUAL CARE       Verification of patient location:  Patient is located at Home in the following state in which I hold an active license [place] :  Assessment & Plan  Mastitis    Orders:    cephalexin (KEFLEX) 500 mg capsule; Take 1 capsule (500 mg total) by mouth every 8 (eight) hours for 7 days        Encounter provider Your Video Visit Provider    The patient was identified by name and date of birth. Nicole Forte was informed that this is a telemedicine visit and that the visit is being conducted through the Epic Embedded platform. She agrees to proceed..  My office door was closed. No one else was in the room.  She acknowledged consent and understanding of privacy and security of the video platform. The patient has agreed to participate and understands they can discontinue the visit at any time.    Patient is aware this is a billable service.     History was obtained from: History obtained from: patient  History of Present Illness     This is a 39 year old female here today for video visit. She was seen early today. She has been having fever, monae aches, chills, cough and congestion for the last 2 days.  She has been having some pain in her breast. Left breast greater than right.  She states early today she did not have any redness over the breast but since has developed redness.       Review of Systems   Constitutional:  Positive for activity change, chills, fatigue and fever.   HENT:  Positive for congestion and rhinorrhea.    Respiratory:  Positive for cough.    Skin:  Positive for color change.   Psychiatric/Behavioral: Negative.         Objective   LMP 2024     Physical Exam  Constitutional:       General: She is not in acute distress.     Appearance: Normal appearance. She is not toxic-appearing.   Pulmonary:      Effort:  Pulmonary effort is normal. No respiratory distress.   Chest:          Comments: Redness noted over the left breast.   Neurological:      Mental Status: She is alert and oriented to person, place, and time.   Psychiatric:         Mood and Affect: Mood normal.         Behavior: Behavior normal.         Thought Content: Thought content normal.         Judgment: Judgment normal.         Visit Time  Total Visit Duration: 5 minutes not including the time spent for establishing the audio/video connection.

## 2025-01-24 NOTE — PROGRESS NOTES
Virtual Regular Visit  Name: Nicole Forte      : 1985      MRN: [id number]  Encounter Provider: Shannon D Severino, PA-C  Encounter Date: 2025   Encounter department: VIRTUAL CARE       Verification of patient location:  Patient is located at Home in the following state in which I hold an active license [place] :  Assessment & Plan  URI with cough and congestion    Orders:    XR chest pa and lateral; Future        Encounter provider Shannon D Severino, PA-C    The patient was identified by name and date of birth. Nicole Forte was informed that this is a telemedicine visit and that the visit is being conducted through the Epic Embedded platform. She agrees to proceed..  My office door was closed. No one else was in the room.  She acknowledged consent and understanding of privacy and security of the video platform. The patient has agreed to participate and understands they can discontinue the visit at any time.    Patient is aware this is a billable service.     History was obtained from: History obtained from: patient  History of Present Illness     Seen at urgent care for URI sx . Has body aches, fever tmax 100.2, breast pain, cough which is productive of blood streak sputum, runny nose, sore throat. Pt reports has been breastfeeding, but it is very painful. Notes breasts are diffusely painful L > R and cannot allow baby to latch on L due to nipple pain. Taking tylenol and ibuprofen, neti pot. No areas of redness to breast.       Review of Systems   Constitutional:  Positive for fatigue and fever.   HENT:  Positive for rhinorrhea and sore throat. Negative for nosebleeds.    Eyes:  Negative for redness.   Respiratory:  Positive for cough. Negative for shortness of breath.    Cardiovascular:  Negative for chest pain.   Gastrointestinal:  Negative for blood in stool.   Genitourinary:  Negative for hematuria.   Musculoskeletal:  Positive for myalgias. Negative for gait problem.   Skin:  Negative for rash.    Neurological:  Negative for seizures.   Psychiatric/Behavioral:  Negative for behavioral problems.        Objective   Temp 100.2 °F (37.9 °C)   LMP 04/18/2024     Physical Exam  Constitutional:       General: She is not in acute distress.     Appearance: Normal appearance. She is normal weight. She is ill-appearing (mild, malaise). She is not toxic-appearing.   HENT:      Head: Normocephalic and atraumatic.      Nose: No rhinorrhea.      Mouth/Throat:      Mouth: Mucous membranes are moist.   Eyes:      Conjunctiva/sclera: Conjunctivae normal.   Pulmonary:      Effort: Pulmonary effort is normal. No respiratory distress.      Breath sounds: No wheezing (no gross audible wheeze through computer).   Musculoskeletal:      Cervical back: Normal range of motion.   Skin:     Findings: No rash (on face or neck).   Neurological:      Mental Status: She is alert.      Cranial Nerves: No dysarthria or facial asymmetry.   Psychiatric:         Mood and Affect: Mood normal.         Behavior: Behavior normal.         Visit Time  Total Visit Duration: 13 minutes not including the time spent for establishing the audio/video connection.

## 2025-02-10 ENCOUNTER — EVALUATION (OUTPATIENT)
Dept: PHYSICAL THERAPY | Facility: CLINIC | Age: 40
End: 2025-02-10
Payer: COMMERCIAL

## 2025-02-10 DIAGNOSIS — N39.46 MIXED INCONTINENCE: ICD-10-CM

## 2025-02-10 DIAGNOSIS — Z98.891 STATUS POST CESAREAN SECTION: Primary | ICD-10-CM

## 2025-02-10 DIAGNOSIS — M62.08 DIASTASIS RECTI: ICD-10-CM

## 2025-02-10 PROCEDURE — 97162 PT EVAL MOD COMPLEX 30 MIN: CPT

## 2025-02-10 PROCEDURE — 97530 THERAPEUTIC ACTIVITIES: CPT

## 2025-02-10 NOTE — PROGRESS NOTES
PT Evaluation     Today's date: 2/10/2025  Patient name: Nicole Forte  : 1985  MRN: [id number]  Referring provider: Vivienne Lino,*  Dx:   Encounter Diagnosis     ICD-10-CM    1. Mixed incontinence  N39.46       2. Status post  section  Z98.891 Ambulatory Referral to Physical Therapy      3. Postpartum care and examination  Z39.2 Ambulatory Referral to Physical Therapy      4. ALLIE (stress urinary incontinence, female)  N39.3 Ambulatory Referral to Physical Therapy          Assessment  Impairments: abnormal coordination, abnormal muscle firing, abnormal or restricted ROM, activity intolerance, impaired physical strength, lacks appropriate home exercise program, poor posture  and poor body mechanics  Symptom irritability: moderate    Assessment details: Nicole Forte is a 39 y.o. female  who presents to outpatient pelvic floor physical therapy with the following complaints: incontinence of urine with certain activities, decreased scar sensitivity and abdominal discomfort post C section. Patient's presentation is consistent with diastasis recti and weakness of the pelvic floor musculature along with increased laxity and pliability of the vaginal canal, supported by findings from the examination including: increased excursion with bear down, decreased general pelvic floor resting tone and decreased strength, and a max 2 finger separation in the rectus abdominis. This patient is functionally limited in ability to maintain continence and exercise for general health and well being. Nicole Forte demonstrates good rehab potential and would benefit from skilled pelvic floor physical therapy to address the above complaints and functional limitations.    Understanding of Dx/Px/POC: good     Prognosis: good    Goals  STG to be completed in 4 weeks:  Patient will demonstrate increased water intake to 75 oz/day  Patient will demonstrate proper sequencing of DB and PFMC  Patient will report 25% or better  improvement in symptoms  Patient will report increased sensation to her C section scar and be indep in scar mobilization      LTG to be completed in 8 weeks:  Patient will demonstrate independence with comprehensive home exercise program.  Patient will report 75% or better improvement in symptoms  Patient will report no UI with any exercise or movement  Patient will demonstrate at least 1 finger breadth improvement across the board in DR      Plan  Patient would benefit from: skilled physical therapy, PT eval and women's health eval  Planned modality interventions: biofeedback, ultrasound, electrical stimulation/Russian stimulation, cryotherapy, thermotherapy: hydrocollator packs, neuromuscular electric stimulation, TENS and traction    Planned therapy interventions: abdominal trunk stabilization, activity modification, ADL retraining, balance/weight bearing training, behavior modification, body mechanics training, breathing training, massage, manual therapy, joint mobilization, Rachel taping, nerve gliding, neuromuscular re-education, patient education, postural training, strengthening, stretching, therapeutic activities, functional ROM exercises, flexibility, fine motor coordination training, home exercise program, therapeutic exercise, kinesiology taping, patient/caregiver education, dry needling, gait training, graded exercise and graded motor    Frequency: 1x week  Duration in weeks: 8  Plan of Care beginning date: 2/10/2025  Plan of Care expiration date: 2025  Treatment plan discussed with: patient        SL AMB SF PT WOMEN'S HEALTH POSTPARTUM SUBJECTIVE EVAL:   History Of Present Illness:  Taylor garcia  who presents 4 weeks  s/p  C Section   Baby was born at 37 weeks gestation and weighed 4lb 8oz.   Patient presents with a history of: urinary leakage with activity, sneezing, laughing, coughing.     Urinary habits: Daytime voiding interval: every hour. Elias to start stream of urine, and characterizes  "it as a strong stream. 2x nocturia. Endorses feelings of incomplete voiding. Denies pain with urination. Endorses urinary incontinence when performing the following actions: laughing, coughing, sneezing, running but with a full bladder during pregnancy.  Endorse UI associated with strong urge. Endorse use of pads- for blood . Reports/denies recurrent UTIs/other infections.     Fluid intake: Water: 1 gallon per day, coffee 1 cup     Activity level: peloton bike and walking with baby, is trying to go back to the gym. Pre pregnancy she worked out every day, and during pregnancy  Occupation: not at the moment     Bowel habits: 1-2 BM/day. Denies straining, constipation, pain with defecation. Reports type 4 stool using the BSS. Endorses hemorrhoids but not bothersome.     GYN habits: 1 delivery was vaginal in 2017 with minor perineal tear. Endorses a fibroid and this is why baby had to come early as it was impeding on her growth. Pre partum period is regular. Is  currently sexually active. Denies  pain or UI with sexual activity.     Continuity Control system history: (hip, back, pelvic pain)  - History of surgery: C section 2024- breast implants      Objective       Abdominal Assessment:      Abdominal Assessment: Abdominal C section scar healing well with no obvious signs of infection, about 50% sensation reported by patient throughout length of scar, mild TTP at the superior portions. What seems to be palpable fibroid patient has and is aware of in the R LQ.    Diastatis   Diastasis recti present: yes  3\" above umbilicus (# fingers): 1.25  Umbilicus (# fingers): 2  3\" below umbilicus (# fingers): 1  Connective tissue integrity at linea alba: boggy  no tenderness at linea alba  able to engage transverse abdominis       General Perineum Exam:   perineum intact.     Visual Inspection of Perineum:   Perineal body inspection: within normal limits        Pelvic Floor Muscle Exam:     Muscle Contraction: well isolated, overflow and " overflow from glutes   Breathing pattern with contraction: holding breath   Pelvic floor muscle relaxation is complete.         PERFECT Score   Power right: 3/5   Power left: 3/5   Endurance (seconds to max): 4   Repetitions (before fatigue): 4    Perfect Score: Mild TTP at the L iliococcygeus. Otherwise increased pliability of musculature and slight weakness. Increased laxity with bear down.       pelvic floor exam consent given by patient    Pelvic exam completed: vaginally              Precautions: postpartum C section      Insurance Eval/ Re-eval POC expires Auth Status Total visits  Start date  Expiration date Misc     4/7/25 4 max units per day    Co-Pay $70                    2/10/25        VISIT 1-IE        Manuals         PFM exam         Abdominal mobilizations         Colonic massage         Scar mobilization Reviewed         Neuro Re-Ed         Neural reset         PFMC slow holds         360 breathing         DB         DB+PFMC         TA+PFMC HEP        Side plank up downs HEP        Bridges HEP        Clamshells NV        Add squeeze HEP        TA+march HEP        Biofeedback                  Ther Ex         Sit to stand +PFMC HEP        Lateral walks with band HEP        Rows/Ext         SLR HEP        Squats         Lunges         Walking         Paloff press NV        QPED- LE ext  NV        - fire hydrant NV        - thread the needle         -cat/cow         Open books         Lizett pose         Happy baby         Butterfly         Deep squat         SKTC/DKTC         LTR         Feet on wall                  Ther Activity         Voiding diary         Knack         Fiber intake education         Bowel habits education         Bladder irritants education         Toileting posture         Urge deferral strategies         Dilator/wand education         Increasing water intake education         Mindfulness education                   Access Code: [email or web address]  URL:  [email or web address]  Date: 02/10/2025  Prepared by: Yvette Lovelace    Exercises  - Supine Bridge with Resistance Band  - 1 x daily - 7 x weekly - 3 sets - 10 reps  - Active Straight Leg Raise with Quad Set  - 1 x daily - 7 x weekly - 3 sets - 10 reps  - Supine Hip Adduction Isometric with Ball  - 1 x daily - 7 x weekly - 3 sets - 10 reps  - Supine Bridge with Mini Swiss Ball Between Knees  - 1 x daily - 7 x weekly - 3 sets - 10 reps  - Side Plank on Knees  - 1 x daily - 7 x weekly - 3 sets - 10 reps  - Sit to Stand with Pelvic Floor Contraction  - 1 x daily - 7 x weekly - 3 sets - 10 reps  - Standing Hip Abduction with Resistance on Sound Leg (BKA)  - 1 x daily - 7 x weekly - 3 sets - 10 reps  - Sidestepping with Pelvic Floor Contraction  - 1 x daily - 7 x weekly - 3 sets - 10 reps  - Step Up with Pelvic Floor Contraction  - 1 x daily - 7 x weekly - 3 sets - 10 reps

## 2025-02-24 ENCOUNTER — OFFICE VISIT (OUTPATIENT)
Dept: PHYSICAL THERAPY | Facility: CLINIC | Age: 40
End: 2025-02-24
Payer: COMMERCIAL

## 2025-02-24 DIAGNOSIS — M62.08 DIASTASIS RECTI: ICD-10-CM

## 2025-02-24 DIAGNOSIS — N39.46 MIXED INCONTINENCE: ICD-10-CM

## 2025-02-24 DIAGNOSIS — Z98.891 STATUS POST CESAREAN SECTION: Primary | ICD-10-CM

## 2025-02-24 PROCEDURE — 97110 THERAPEUTIC EXERCISES: CPT

## 2025-02-24 PROCEDURE — 97112 NEUROMUSCULAR REEDUCATION: CPT

## 2025-02-24 NOTE — PROGRESS NOTES
Daily Note - DISCHARGE    Today's date: 2025  Patient name: Nicole Forte  : 1985  MRN: [id number]  Referring provider: Vivienne Lino,*  Dx:   Encounter Diagnosis     ICD-10-CM    1. Status post  section  Z98.891       2. Postpartum care and examination  Z39.2       3. Mixed incontinence  N39.46       4. Diastasis recti  M62.08         Goals  STG to be completed in 4 weeks:  Patient will demonstrate increased water intake to 75 oz/day   met  Patient will demonstrate proper sequencing of DB and PFMC   met  Patient will report 25% or better improvement in symptoms    met  Patient will report increased sensation to her C section scar and be indep in scar mobilization ongoing      LTG to be completed in 8 weeks:  Patient will demonstrate independence with comprehensive home exercise program.  ongoing  Patient will report 75% or better improvement in symptoms      met  Patient will report no UI with any exercise or movement      met  Patient will demonstrate at least 1 finger breadth improvement across the board in DR  ongoing    Patient has done exceedingly well with PFPT- was shown various HEP exercises. She has no leakage or pain in any of her exercises.    Subjective: She notes doing some of the HEP, but notes she has been incorporating the PFMC into her gym workout. Patient is sneezing and coughing without leakage. She is pretty much back to her normal pre pregnancy exercises. There is no pain at her C section scar.       Objective: See treatment diary below      Assessment: Tolerated treatment well. She is doing significantly well at this time, with no issues or symptoms. She was encouraged to reach back out with any questions. She will be discharged at this time.       Plan: discharge patient     Precautions: postpartum C section      Insurance Eval/ Re-eval POC expires Auth Status Total visits  Start date  Expiration date Misc   ama  25 4 max units per day    Co-Pay $70                     2/10/25 2/24/25       VISIT 1-IE 2       Manuals         PFM exam         Abdominal mobilizations  HEP exercises:  Tall kneeling around the worlds  DB push outs in tall kneel without toes  Half kneel woodchops  Bird dog variations x3  Fire hydrants         Colonic massage         Scar mobilization Reviewed         Neuro Re-Ed         Neural reset         PFMC slow holds         360 breathing         DB         DB+PFMC         TA+PFMC HEP        Side plank up downs HEP        Bridges HEP        Clamshells NV        Add squeeze HEP        TA+march HEP        Biofeedback                  Ther Ex         Sit to stand +PFMC HEP        Lateral walks with band HEP        Rows/Ext         SLR HEP        Squats         Lunges         Walking         Paloff press NV        QPED- LE ext  NV        - fire hydrant NV        - thread the needle         -cat/cow  Bird dog         Dead bug          Tall kneel:  With db        Open books         Lizett pose         Happy baby         Butterfly         Deep squat         SKTC/DKTC         LTR         Feet on wall                  Ther Activity         Voiding diary         Knack         Fiber intake education         Bowel habits education         Bladder irritants education         Toileting posture         Urge deferral strategies         Dilator/wand education         Increasing water intake education         Mindfulness education                     Access Code: [email or web address]  URL: [email or web address]  Date: 02/24/2025  Prepared by: Yvette Lovelace    Exercises  - Standing Thoracic Open Book at Wall  - 1 x daily - 7 x weekly - 3 sets - 10 reps  - Sidelying Thoracic Rotation with Open Book  - 1 x daily - 7 x weekly - 3 sets - 10 reps  - Plank with Thoracic Rotation on Counter  - 1 x daily - 7 x weekly - 3 sets - 10 reps  - Tall Kneeling Diagonal Lift with Medicine Ball  - 1 x daily - 7 x weekly - 3 sets - 10 reps  - Quadruped Pelvic Floor Contraction with  Opposite Arm and Leg Lift  - 1 x daily - 7 x weekly - 3 sets - 10 reps  - Standing Anti-Rotation Press with Anchored Resistance  - 1 x daily - 7 x weekly - 3 sets - 10 reps  - Hooklying Anti-Rotation Press With Anchored Resistance  - 1 x daily - 7 x weekly - 3 sets - 10 reps  - Quadruped Fire Hydrant  - 1 x daily - 7 x weekly - 3 sets - 10 reps  - Supine Dead Bug with Leg Extension  - 1 x daily - 7 x weekly - 3 sets - 10 reps  - Plank with Hip Extension  - 1 x daily - 7 x weekly - 3 sets - 10 reps  - Push Up with Trunk Rotation  - 1 x daily - 7 x weekly - 3 sets - 10 reps  - Supine 90/90 Shoulder Flexion with Abdominal Bracing  - 1 x daily - 7 x weekly - 3 sets - 10 reps  - Supine Band Pull-Downs  - 1 x daily - 7 x weekly - 3 sets - 10 reps  - Supine Oblique Curl Up with Anchored Resistance  - 1 x daily - 7 x weekly - 3 sets - 10 reps

## 2025-03-03 ENCOUNTER — TELEPHONE (OUTPATIENT)
Dept: OBGYN CLINIC | Facility: CLINIC | Age: 40
End: 2025-03-03

## (undated) DEVICE — ABDOMINAL PAD: Brand: DERMACEA

## (undated) DEVICE — SUT VICRYL 0 CT-1 36 IN J946H

## (undated) DEVICE — GLOVE INDICATOR PI UNDERGLOVE SZ 7.5 BLUE

## (undated) DEVICE — EXOFIN PRECISION PEN HIGH VISCOSITY TOPICAL SKIN ADHESIVE: Brand: EXOFIN PRECISION PEN, 1G

## (undated) DEVICE — SUT PLAIN 2-0 CTX 27 IN 872H

## (undated) DEVICE — GLOVE PI ULTRA TOUCH SZ.7.0

## (undated) DEVICE — GAUZE SPONGES,16 PLY: Brand: CURITY

## (undated) DEVICE — PACK PBDS MANDIBLE RF

## (undated) DEVICE — LIGHT HANDLE COVER SLEEVE DISP BLUE STELLAR

## (undated) DEVICE — CHLORAPREP HI-LITE 26ML ORANGE

## (undated) DEVICE — SUT MONOCRYL 3-0 SH 27 IN Y316H

## (undated) DEVICE — SURGIFOAM 8.5 X 12.5

## (undated) DEVICE — Device

## (undated) DEVICE — SKIN MARKER DUAL TIP WITH RULER CAP, FLEXIBLE RULER AND LABELS: Brand: DEVON

## (undated) DEVICE — INTENDED FOR TISSUE SEPARATION, AND OTHER PROCEDURES THAT REQUIRE A SHARP SURGICAL BLADE TO PUNCTURE OR CUT.: Brand: BARD-PARKER ® CARBON RIB-BACK BLADES

## (undated) DEVICE — TELFA NON-ADHERENT ABSORBENT DRESSING: Brand: TELFA

## (undated) DEVICE — SUT VICRYL 0 CTX 36 IN J978H

## (undated) DEVICE — SUT MONOCRYL 3-0 UNDYED KS CS-1 Y523H

## (undated) DEVICE — GLOVE SRG BIOGEL ECLIPSE 7.5

## (undated) DEVICE — LAPAROTOMY SPONGE - RF AND X-RAY DETECTABLE PRE-WASHED: Brand: SITUATE

## (undated) DEVICE — PACK C-SECTION PBDS